# Patient Record
Sex: FEMALE | Race: WHITE | NOT HISPANIC OR LATINO | Employment: FULL TIME | URBAN - METROPOLITAN AREA
[De-identification: names, ages, dates, MRNs, and addresses within clinical notes are randomized per-mention and may not be internally consistent; named-entity substitution may affect disease eponyms.]

---

## 2017-01-23 ENCOUNTER — HOSPITAL ENCOUNTER (OUTPATIENT)
Dept: ULTRASOUND IMAGING | Facility: CLINIC | Age: 27
Discharge: HOME | End: 2017-01-23
Attending: OBSTETRICS & GYNECOLOGY | Admitting: OBSTETRICS & GYNECOLOGY
Payer: COMMERCIAL

## 2017-01-23 DIAGNOSIS — Z34.82 PRENATAL CARE, SUBSEQUENT PREGNANCY, SECOND TRIMESTER: ICD-10-CM

## 2017-01-23 PROCEDURE — 76805 OB US >/= 14 WKS SNGL FETUS: CPT

## 2017-01-24 NOTE — PROGRESS NOTES
Quick Note:    Your results are normal. Please followup as was discussed in the office or call with questions.  Lina Butler MD      ______

## 2017-02-03 ENCOUNTER — PRENATAL OFFICE VISIT (OUTPATIENT)
Dept: OBGYN | Facility: CLINIC | Age: 27
End: 2017-02-03
Payer: COMMERCIAL

## 2017-02-03 VITALS
HEIGHT: 62 IN | HEART RATE: 110 BPM | WEIGHT: 190.2 LBS | BODY MASS INDEX: 35 KG/M2 | DIASTOLIC BLOOD PRESSURE: 64 MMHG | SYSTOLIC BLOOD PRESSURE: 123 MMHG

## 2017-02-03 DIAGNOSIS — Z34.80 PRENATAL CARE, SUBSEQUENT PREGNANCY, UNSPECIFIED TRIMESTER: Primary | ICD-10-CM

## 2017-02-03 PROCEDURE — 99207 ZZC PRENATAL VISIT: CPT | Performed by: OBSTETRICS & GYNECOLOGY

## 2017-02-03 NOTE — PROGRESS NOTES
"CC: prenatal visit  S:  No ctx/lof/vb/dc.  Good fm  /64 mmHg  Pulse 110  Ht 5' 1.5\" (1.562 m)  Wt 190 lb 3.2 oz (86.274 kg)  BMI 35.36 kg/m2  LMP  (LMP Unknown)  US normal and reviewed  A/P routine precautions  F/u 4 weeks  Labs ordered  Rh negative plan for rhogam 28 and tdap  Lina Butler MD      "

## 2017-02-03 NOTE — NURSING NOTE
"Chief Complaint   Patient presents with     Prenatal Care     abdominal pain after son hit belly       Initial /64 mmHg  Pulse 110  Ht 5' 1.5\" (1.562 m)  Wt 190 lb 3.2 oz (86.274 kg)  BMI 35.36 kg/m2  LMP  (LMP Unknown) Estimated body mass index is 35.36 kg/(m^2) as calculated from the following:    Height as of this encounter: 5' 1.5\" (1.562 m).    Weight as of this encounter: 190 lb 3.2 oz (86.274 kg).  BP completed using cuff size: srinath Jasso LPN      "

## 2017-03-03 ENCOUNTER — TELEPHONE (OUTPATIENT)
Dept: OBGYN | Facility: CLINIC | Age: 27
End: 2017-03-03

## 2017-03-03 ENCOUNTER — PRENATAL OFFICE VISIT (OUTPATIENT)
Dept: OBGYN | Facility: CLINIC | Age: 27
End: 2017-03-03
Payer: COMMERCIAL

## 2017-03-03 VITALS
WEIGHT: 201 LBS | BODY MASS INDEX: 36.99 KG/M2 | HEART RATE: 89 BPM | SYSTOLIC BLOOD PRESSURE: 102 MMHG | DIASTOLIC BLOOD PRESSURE: 55 MMHG | HEIGHT: 62 IN

## 2017-03-03 DIAGNOSIS — O99.810 ABNORMAL MATERNAL GLUCOSE TOLERANCE, ANTEPARTUM: Primary | ICD-10-CM

## 2017-03-03 DIAGNOSIS — O99.810 ABNORMAL MATERNAL GLUCOSE TOLERANCE, ANTEPARTUM: ICD-10-CM

## 2017-03-03 DIAGNOSIS — Z34.80 PRENATAL CARE, SUBSEQUENT PREGNANCY, UNSPECIFIED TRIMESTER: ICD-10-CM

## 2017-03-03 DIAGNOSIS — Z34.82 PRENATAL CARE, SUBSEQUENT PREGNANCY, SECOND TRIMESTER: Primary | ICD-10-CM

## 2017-03-03 LAB
GLUCOSE 1H P 50 G GLC PO SERPL-MCNC: 196 MG/DL (ref 60–129)
HGB BLD-MCNC: 10.9 G/DL (ref 11.7–15.7)
T PALLIDUM IGG+IGM SER QL: NEGATIVE

## 2017-03-03 PROCEDURE — 36415 COLL VENOUS BLD VENIPUNCTURE: CPT | Performed by: OBSTETRICS & GYNECOLOGY

## 2017-03-03 PROCEDURE — 82950 GLUCOSE TEST: CPT | Performed by: OBSTETRICS & GYNECOLOGY

## 2017-03-03 PROCEDURE — 99207 ZZC PRENATAL VISIT: CPT | Performed by: OBSTETRICS & GYNECOLOGY

## 2017-03-03 PROCEDURE — 86780 TREPONEMA PALLIDUM: CPT | Performed by: OBSTETRICS & GYNECOLOGY

## 2017-03-03 PROCEDURE — 00000218 ZZHCL STATISTIC OBHBG - HEMOGLOBIN: Performed by: OBSTETRICS & GYNECOLOGY

## 2017-03-03 NOTE — NURSING NOTE
"Initial /55 (BP Location: Left arm, Patient Position: Chair, Cuff Size: Adult Large)  Pulse 89  Ht 5' 1.5\" (1.562 m)  Wt 201 lb (91.2 kg)  LMP  (LMP Unknown)  BMI 37.36 kg/m2 Estimated body mass index is 37.36 kg/(m^2) as calculated from the following:    Height as of this encounter: 5' 1.5\" (1.562 m).    Weight as of this encounter: 201 lb (91.2 kg). .    Anai Hendrix    "

## 2017-03-03 NOTE — PROGRESS NOTES
Prenatal Breastfeeding Education Toolkit provided for patient to review, helping her to make an informed decision on a feeding choice for her baby. Questions directed to the provider.  Introduced  Anai Hendrix

## 2017-03-03 NOTE — TELEPHONE ENCOUNTER
Pt 25 w preg. She had a glucose test done today and she failed it.  Pt wants to know if she can re-take?  She said she thinks she failed it because she has been having a lot of sweets lately.      Please advise -    Deanna Mcmullen  [facility] [place]

## 2017-03-03 NOTE — PROGRESS NOTES
"Doing well.   C/o of bilateral carpel tunnel symptoms of her hands; numbness and tingling; requests wrist splints, she needed this for her last pregnancy  Denies VB, ctx, LOF. +FM  /55 (BP Location: Left arm, Patient Position: Chair, Cuff Size: Adult Large)  Pulse 89  Ht 5' 1.5\" (1.562 m)  Wt 201 lb (91.2 kg)  LMP  (LMP Unknown)  BMI 37.36 kg/m2  General Appearance: NAD  Abdomen: Gravid, NT  Refer to flow sheet above.   A/P: 26 year old  at 25w3d  -- 1hr GCT, OB Hgb today; syphilis testing offered; patient accepts  -- bilateral carpel tunnel: wrist splints ordered  -- PTL precautions reviewed  RTC in 4 weeks    Star Mancilla MD  [facility]            "

## 2017-03-03 NOTE — PROGRESS NOTES
Pt notified of below.  Pt reports understanding.  Pt does not have further questions or concerns.  Referral generated.  Patient will call for appointment.    Maria Eugenia Arguello   [facility]  MARJORIE

## 2017-03-03 NOTE — TELEPHONE ENCOUNTER
Spoke with patient on the phone.  Recommended following through with DE due to results of testing.  Glucose monitoring will be beneficial to monitor and track patient BS levels.  Patient reports understanding.    Maria Eugenia Arguello   [facility] Clinic  RN

## 2017-03-03 NOTE — MR AVS SNAPSHOT
"              After Visit Summary   3/3/2017    Mora Reardon    MRN: [id number]           Patient Information     Date Of Birth          1990        Visit Information        Provider Department      3/3/2017 9:30 AM Star Mancilla MD [facility]        Today's Diagnoses     Prenatal care, subsequent pregnancy, second trimester    -  1       Follow-ups after your visit        Who to contact     If you have questions or need follow up information about today's clinic visit or your schedule please contact [facility] directly at [phone].  Normal or non-critical lab and imaging results will be communicated to you by [employer], letter or phone within 4 business days after the clinic has received the results. If you do not hear from us within 7 days, please contact the clinic through [employer] or phone. If you have a critical or abnormal lab result, we will notify you by phone as soon as possible.  Submit refill requests through [employer] or call your pharmacy and they will forward the refill request to us. Please allow 3 business days for your refill to be completed.          Additional Information About Your Visit        MyChart Information     [employer] lets you send messages to your doctor, view your test results, renew your prescriptions, schedule appointments and more. To sign up, go to [email or web address] . Click on \"Log in\" on the left side of the screen, which will take you to the Welcome page. Then click on \"Sign up Now\" on the right side of the page.     You will be asked to enter the access code listed below, as well as some personal information. Please follow the directions to create your username and password.     Your access code is: [email or web address]  Expires: 2017  9:37 AM     Your access code will  in 90 days. If you need help or a new code, please call your [facility] or [phone].        Care EveryWhere ID     This is your Care EveryWhere " "ID. This could be used by other organizations to access your [facility] medical records  [id number]        Your Vitals Were     Pulse Height Last Period BMI (Body Mass Index)          89 5' 1.5\" (1.562 m) (LMP Unknown) 37.36 kg/m2         Blood Pressure from Last 3 Encounters:   03/03/17 102/55   02/03/17 123/64   12/30/16 122/50    Weight from Last 3 Encounters:   03/03/17 201 lb (91.2 kg)   02/03/17 190 lb 3.2 oz (86.3 kg)   12/30/16 180 lb 12.8 oz (82 kg)              We Performed the Following     Anti Treponema        Primary Care Provider    [facility]       No address on file        Thank you!     Thank you for choosing [facility]  for your care. Our goal is always to provide you with excellent care. Hearing back from our patients is one way we can continue to improve our services. Please take a few minutes to complete the written survey that you may receive in the mail after your visit with us. Thank you!             Your Updated Medication List - Protect others around you: Learn how to safely use, store and throw away your medicines at www.disposemymeds.org.          This list is accurate as of: 3/3/17  9:37 AM.  Always use your most recent med list.                   Brand Name Dispense Instructions for use    CHILDRENS CHEWABLE VITAMINS PO          potassium chloride 20 MEQ Packet    KLOR-CON    90 tablet    Take 20 mEq by mouth daily         "

## 2017-03-06 NOTE — PROGRESS NOTES
Syphilis testing negative. Will review at next follow-up visit.     Star Mancilla MD  [facility]

## 2017-03-23 ENCOUNTER — OFFICE VISIT (OUTPATIENT)
Dept: FAMILY MEDICINE | Facility: CLINIC | Age: 27
End: 2017-03-23
Payer: COMMERCIAL

## 2017-03-23 VITALS
BODY MASS INDEX: 36.99 KG/M2 | TEMPERATURE: 98.7 F | WEIGHT: 199 LBS | HEART RATE: 100 BPM | SYSTOLIC BLOOD PRESSURE: 104 MMHG | DIASTOLIC BLOOD PRESSURE: 50 MMHG

## 2017-03-23 DIAGNOSIS — H66.005 RECURRENT ACUTE SUPPURATIVE OTITIS MEDIA WITHOUT SPONTANEOUS RUPTURE OF LEFT TYMPANIC MEMBRANE: Primary | ICD-10-CM

## 2017-03-23 PROCEDURE — 99213 OFFICE O/P EST LOW 20 MIN: CPT | Performed by: PHYSICIAN ASSISTANT

## 2017-03-23 RX ORDER — AMOXICILLIN 500 MG/1
1000 CAPSULE ORAL 2 TIMES DAILY
Qty: 30 CAPSULE | Refills: 0 | Status: SHIPPED | OUTPATIENT
Start: 2017-03-23 | End: 2017-04-14

## 2017-03-23 ASSESSMENT — ENCOUNTER SYMPTOMS
SENSORY CHANGE: 0
DOUBLE VISION: 0
SEIZURES: 0
ABDOMINAL PAIN: 0
LOSS OF CONSCIOUSNESS: 0
SPUTUM PRODUCTION: 0
PALPITATIONS: 0
BACK PAIN: 0
TINGLING: 0
SORE THROAT: 0
WEIGHT LOSS: 0
DIARRHEA: 0
FOCAL WEAKNESS: 0
COUGH: 0
DYSURIA: 0
NERVOUS/ANXIOUS: 0
CONSTIPATION: 0
INSOMNIA: 0
EYE DISCHARGE: 0
EYE REDNESS: 0
ORTHOPNEA: 0
DIAPHORESIS: 0
HALLUCINATIONS: 0
FREQUENCY: 0
DIZZINESS: 0
BLOOD IN STOOL: 0
WHEEZING: 0
WEAKNESS: 0
NECK PAIN: 0
BLURRED VISION: 0
FEVER: 1
MYALGIAS: 0
HEARTBURN: 0
PHOTOPHOBIA: 0
EYE PAIN: 0
DEPRESSION: 0
HEMOPTYSIS: 0
NAUSEA: 0
VOMITING: 0
NEUROLOGICAL NEGATIVE: 1
SHORTNESS OF BREATH: 0
HEADACHES: 0

## 2017-03-23 ASSESSMENT — LIFESTYLE VARIABLES: SUBSTANCE_ABUSE: 0

## 2017-03-23 NOTE — MR AVS SNAPSHOT
After Visit Summary   3/23/2017    Mora Reardon    MRN: [id number]           Patient Information     Date Of Birth          1990        Visit Information        Provider Department      3/23/2017 8:40 AM Warren Holt PA-C [facility]        Today's Diagnoses     Recurrent acute suppurative otitis media without spontaneous rupture of left tympanic membrane    -  1      Care Instructions    Follow-up if not improving        Follow-ups after your visit        Your next 10 appointments already scheduled     Mar 31, 2017  8:45 AM CDT   ESTABLISHED PRENATAL with Star Mancilla MD   [facility]    [address] [facility] [place] [address]   [phone]            Apr 04, 2017  8:30 AM CDT   Diabetic Education with [place] [facility]   [facility] ([facility])    [address] [facility] [address]   [phone]              Who to contact     If you have questions or need follow up information about today's clinic visit or your schedule please contact [facility] directly at [phone].  Normal or non-critical lab and imaging results will be communicated to you by MyChart, letter or phone within 4 business days after the clinic has received the results. If you do not hear from us within 7 days, please contact the clinic through [employer] or phone. If you have a critical or abnormal lab result, we will notify you by phone as soon as possible.  Submit refill requests through [employer] or call your pharmacy and they will forward the refill request to us. Please allow 3 business days for your refill to be completed.          Additional Information About Your Visit        MyChart Information     [employer] lets you send messages to your doctor, view your test results, renew your prescriptions, schedule appointments and more. To sign up, go to [email or web address] . Click on  "\"Log in\" on the left side of the screen, which will take you to the Welcome page. Then click on \"Sign up Now\" on the right side of the page.     You will be asked to enter the access code listed below, as well as some personal information. Please follow the directions to create your username and password.     Your access code is: [email or web address]  Expires: 2017 10:37 AM     Your access code will  in 90 days. If you need help or a new code, please call your [facility] clinic or [phone].        Care EveryWhere ID     This is your Care EveryWhere ID. This could be used by other organizations to access your [facility] medical records  [id number]        Your Vitals Were     Pulse Temperature Last Period BMI (Body Mass Index)          100 98.7  F (37.1  C) (Tympanic) (LMP Unknown) 36.99 kg/m2         Blood Pressure from Last 3 Encounters:   17 104/50   17 102/55   17 123/64    Weight from Last 3 Encounters:   17 199 lb (90.3 kg)   17 201 lb (91.2 kg)   17 190 lb 3.2 oz (86.3 kg)              Today, you had the following     No orders found for display         Today's Medication Changes          These changes are accurate as of: 3/23/17  9:05 AM.  If you have any questions, ask your nurse or doctor.               Start taking these medicines.        Dose/Directions    amoxicillin 500 MG capsule   Commonly known as:  AMOXIL   Used for:  Recurrent acute suppurative otitis media without spontaneous rupture of left tympanic membrane   Started by:  Warren Holt PA-C        Dose:  1000 mg   Take 2 capsules (1,000 mg) by mouth 2 times daily   Quantity:  30 capsule   Refills:  0            Where to get your medicines      These medications were sent to [facility] [place] [id number] [place] [id number]     Phone:  [phone]     amoxicillin 500 MG capsule                Primary Care Provider    [facility]       No address on file        Thank you!     Thank you for choosing [facility]  for your care. Our goal is always to provide you with excellent care. Hearing back from our patients is one way we can continue to improve our services. Please take a few minutes to complete the written survey that you may receive in the mail after your visit with us. Thank you!             Your Updated Medication List - Protect others around you: Learn how to safely use, store and throw away your medicines at www.disposemymeds.org.          This list is accurate as of: 3/23/17  9:05 AM.  Always use your most recent med list.                   Brand Name Dispense Instructions for use    amoxicillin 500 MG capsule    AMOXIL    30 capsule    Take 2 capsules (1,000 mg) by mouth 2 times daily       CHILDRENS CHEWABLE VITAMINS PO          potassium chloride 20 MEQ Packet    KLOR-CON    90 tablet    Take 20 mEq by mouth daily

## 2017-03-23 NOTE — PROGRESS NOTES
HPI    SUBJECTIVE:                                                    Mora Reardon is a 26 year old female who presents to clinic today for the following health issues:    ENT Symptoms             Symptoms: cc Present Absent Comment   Fever/Chills   x    Fatigue  x     Muscle Aches   x    Eye Irritation   x    Sneezing   x    Nasal Lupillo/Drg   x    Sinus Pressure/Pain   x    Loss of smell   x    Dental pain   x    Sore Throat   x    Swollen Glands   x    Ear Pain/Fullness  x  Left ear    Cough  x     Wheeze   x    Chest Pain   x    Shortness of breath   x    Rash       Other         Symptom duration:  Since early this morning    Symptom severity:     Treatments tried:     Contacts:  Not that she knows of      Problem list and histories reviewed & adjusted, as indicated.  Additional history: as documented    Patient Active Problem List   Diagnosis     Bipolar disorder (H)     Constipation     Bartter's syndrome     Esophageal reflux     History of marijuana, cocaine, and methampetamine use-quit May 2005     Chlamydia treated 4/24/06     Condyloma acuminatum     Rh negative status during pregnancy     Patient non-compliance     CTS (carpal tunnel syndrome)     GERD (gastroesophageal reflux disease)     CARDIOVASCULAR SCREENING; LDL GOAL LESS THAN 160     Generalized anxiety disorder     Mild major depression (H)     Prenatal care, subsequent pregnancy     Abnormal maternal glucose tolerance, antepartum     Past Surgical History:   Procedure Laterality Date     NO HISTORY OF SURGERY         Social History   Substance Use Topics     Smoking status: Former Smoker     Packs/day: 0.50     Years: 6.00     Types: Cigarettes     Smokeless tobacco: Not on file      Comment: smoking 10cig/day- quit with pregnancy     Alcohol use No      Comment: rare- quit with pregnancy     Family History   Problem Relation Age of Onset     DIABETES Mother      Thyroid Disease Mother      Depression Mother      Hypertension Paternal  Grandmother      Cancer - colorectal Paternal Grandmother      colon     CANCER Paternal Grandfather      tongue cancer, not smoker     Genetic Disorder Sister      Bartter Syndrome         Current Outpatient Prescriptions   Medication Sig Dispense Refill     amoxicillin (AMOXIL) 500 MG capsule Take 2 capsules (1,000 mg) by mouth 2 times daily 30 capsule 0     Pediatric Multiple Vit-C-FA (CHILDRENS CHEWABLE VITAMINS PO)        potassium chloride (KLOR-CON) 20 MEQ packet Take 20 mEq by mouth daily 90 tablet 3     Allergies   Allergen Reactions     Keflex [Cephalexin Monohydrate] Hives and Rash     Labs reviewed in EPIC    Reviewed and updated as needed this visit by clinical staff  Tobacco  Allergies  Med Hx  Surg Hx  Fam Hx  Soc Hx      Reviewed and updated as needed this visit by Provider       Review of Systems   Constitutional: Positive for fever. Negative for diaphoresis, malaise/fatigue and weight loss.   HENT: Positive for congestion and ear pain. Negative for ear discharge, hearing loss, nosebleeds and sore throat.    Eyes: Negative for blurred vision, double vision, photophobia, pain, discharge and redness.   Respiratory: Negative for cough, hemoptysis, sputum production, shortness of breath and wheezing.    Cardiovascular: Negative for chest pain, palpitations, orthopnea and leg swelling.   Gastrointestinal: Negative for abdominal pain, blood in stool, constipation, diarrhea, heartburn, melena, nausea and vomiting.   Genitourinary: Negative.  Negative for dysuria, frequency and urgency.   Musculoskeletal: Negative for back pain, joint pain, myalgias and neck pain.   Skin: Negative for itching and rash.   Neurological: Negative.  Negative for dizziness, tingling, sensory change, focal weakness, seizures, loss of consciousness, weakness and headaches.   Endo/Heme/Allergies: Negative.    Psychiatric/Behavioral: Negative for depression, hallucinations, substance abuse and suicidal ideas. The patient is not  nervous/anxious and does not have insomnia.          Physical Exam   Constitutional: She is oriented to person, place, and time and well-developed, well-nourished, and in no distress.   HENT:   Head: Normocephalic and atraumatic.   Right Ear: Hearing, external ear and ear canal normal. Tympanic membrane is not injected, not erythematous and not bulging. Tympanic membrane mobility is normal. No middle ear effusion.   Left Ear: Hearing, external ear and ear canal normal. Tympanic membrane is injected, erythematous and bulging. Tympanic membrane mobility is abnormal. A middle ear effusion is present.   Nose: Rhinorrhea present.   Mouth/Throat: Oropharyngeal exudate present.   Eyes: Conjunctivae, EOM and lids are normal. Pupils are equal, round, and reactive to light.   Neck: Normal range of motion and full passive range of motion without pain. Neck supple. Carotid bruit is not present. No tracheal deviation present. No thyroid mass and no thyromegaly present.   Cardiovascular: Normal rate, regular rhythm, normal heart sounds and normal pulses.    Pulmonary/Chest: Effort normal and breath sounds normal.   Abdominal: Soft. Normal appearance. There is no tenderness.   Musculoskeletal: Normal range of motion.   Lymphadenopathy:     She has no cervical adenopathy.   Neurological: She is alert and oriented to person, place, and time.   Skin: Skin is warm, dry and intact.   Psychiatric: Mood, memory, affect and judgment normal.           (H66.005) Recurrent acute suppurative otitis media without spontaneous rupture of left tympanic membrane  (primary encounter diagnosis)  Comment:   Plan: amoxicillin (AMOXIL) 500 MG capsule           Follow-up if not improving.

## 2017-03-23 NOTE — NURSING NOTE
"Chief Complaint   Patient presents with     Ear Problem       Initial /50 (BP Location: Right arm, Patient Position: Chair, Cuff Size: Adult Large)  Pulse 100  Temp 98.7  F (37.1  C) (Tympanic)  Wt 199 lb (90.3 kg)  LMP  (LMP Unknown)  BMI 36.99 kg/m2 Estimated body mass index is 36.99 kg/(m^2) as calculated from the following:    Height as of 3/3/17: 5' 1.5\" (1.562 m).    Weight as of this encounter: 199 lb (90.3 kg).  Medication Reconciliation: complete    Health Maintenance that is potentially due pending provider review:  NONE    n/a    Katherine CATHERINE CMA    "

## 2017-03-25 ENCOUNTER — TELEPHONE (OUTPATIENT)
Dept: NURSING | Facility: CLINIC | Age: 27
End: 2017-03-25

## 2017-03-25 NOTE — TELEPHONE ENCOUNTER
Call Type: Triage Call    Presenting Problem: Caller  is inquiring if it is okay that she took  her amoxicillin 2 hrs earlier than usual and if it is okay to  tyleonol with it.   is  28 weeks pregnant and  has  an ear infection  Advised that doses should be   about 12 hrs apart but that  there  is no illl effect if taken sooner and that  acetaminophen is  compatible with amoxicillin.  Triage Note:  Guideline Title: Medication Questions - Adult  Recommended Disposition: Provide Health Information  Original Inclination: Wanted to speak with a nurse  Override Disposition:  Intended Action: Follow Selfcare / Homecare  Physician Contacted: No  Caller has medication question(s) that was answered with available resources ?  YES  Pregnant and has medication questions regarding prescribed and/or nonprescribed  medication(s) not covered by available resources ? NO  Breastfeeding and has medication questions regarding prescribed and/or  nonprescribed medication(s) not covered by available resources ? NO  Requested information on how to safely dispose of  or unused medications ?  NO  Sign(s) or symptom(s) associated with a diagnosed condition or with a new illness  ? NO  Prescription ordered today and not available at pharmacy putting patient at  clinical risk ? NO  Recurrence of a symptom(s) or illness post prescribed medication treatment AND  provider instructed patient to call if symptom(s) returned. ? NO  Unable to obtain prescribed medication related to available resources AND  situation poses immediate clinical risk ? NO  Pharmacy calling to clarify prescription order. ? NO  Requests refill of prescribed medication that does NOT have a valid refill; lack  of medication may cause clinical risk to patient if not available. ? NO  Has questions about prescribed and/or nonprescribed medications not covered by  available resources ? NO  Pharmacy calling with prescription question; answered per department policy. ?  NO  Requests refill of prescribed medication without valid refills OR requests refill  of prescribed medication with valid refills but does not have prescription number  (no RX container); lack of medication does not put patient at clinical risk ? NO  Physician Instructions:  Care Advice:

## 2017-03-31 ENCOUNTER — PRENATAL OFFICE VISIT (OUTPATIENT)
Dept: OBGYN | Facility: CLINIC | Age: 27
End: 2017-03-31
Payer: COMMERCIAL

## 2017-03-31 VITALS
DIASTOLIC BLOOD PRESSURE: 59 MMHG | HEART RATE: 114 BPM | SYSTOLIC BLOOD PRESSURE: 110 MMHG | WEIGHT: 199.8 LBS | BODY MASS INDEX: 37.14 KG/M2

## 2017-03-31 DIAGNOSIS — Z34.83 PRENATAL CARE, SUBSEQUENT PREGNANCY, THIRD TRIMESTER: Primary | ICD-10-CM

## 2017-03-31 PROCEDURE — 96372 THER/PROPH/DIAG INJ SC/IM: CPT | Performed by: OBSTETRICS & GYNECOLOGY

## 2017-03-31 PROCEDURE — 90715 TDAP VACCINE 7 YRS/> IM: CPT | Performed by: OBSTETRICS & GYNECOLOGY

## 2017-03-31 PROCEDURE — 90471 IMMUNIZATION ADMIN: CPT | Performed by: OBSTETRICS & GYNECOLOGY

## 2017-03-31 PROCEDURE — 99207 ZZC PRENATAL VISIT: CPT | Performed by: OBSTETRICS & GYNECOLOGY

## 2017-03-31 NOTE — MR AVS SNAPSHOT
After Visit Summary   3/31/2017    Mora Reardon    MRN: [id number]           Patient Information     Date Of Birth          1990        Visit Information        Provider Department      3/31/2017 8:45 AM Star Mancilla MD [facility]        Today's Diagnoses     Prenatal care, subsequent pregnancy, third trimester    -  1      Care Instructions    Per Physician's instructions          Follow-ups after your visit        Your next 10 appointments already scheduled     Apr 04, 2017  8:30 AM CDT   Diabetic Education with [place] ED RESOURCE   [facility])    [address] [facility] [address]   [phone]            Apr 14, 2017  8:45 AM CDT   ESTABLISHED PRENATAL with Star Mancilla MD   [facility] ([facility])    [address] [facility] [address]   [phone]            Apr 28, 2017  9:00 AM CDT   ESTABLISHED PRENATAL with Savi Smart MD   [facility] ([facility])    [address] [facility] [address]   [phone]            May 10, 2017  9:00 AM CDT   ESTABLISHED PRENATAL with Lina Butler MD   [facility] ([facility])    [address] [facility] [address]   [phone]            May 23, 2017  9:00 AM CDT   ESTABLISHED PRENATAL with Savi Smart MD   [facility] ([facility])    [address] [place] [address]   [phone]              Who to contact     If you have questions or need follow up information about today's clinic visit or your schedule please contact [facility] directly at [phone].  Normal or non-critical lab and imaging results will be communicated to you by MyChart, letter or phone within 4 business days after the clinic has received the results. If you do not hear from us  "within 7 days, please contact the clinic through [employer] or phone. If you have a critical or abnormal lab result, we will notify you by phone as soon as possible.  Submit refill requests through [employer] or call your pharmacy and they will forward the refill request to us. Please allow 3 business days for your refill to be completed.          Additional Information About Your Visit        [employer] Information     [employer] lets you send messages to your doctor, view your test results, renew your prescriptions, schedule appointments and more. To sign up, go to [email or web address] . Click on \"Log in\" on the left side of the screen, which will take you to the Welcome page. Then click on \"Sign up Now\" on the right side of the page.     You will be asked to enter the access code listed below, as well as some personal information. Please follow the directions to create your username and password.     Your access code is: [email or web address]  Expires: 2017 10:37 AM     Your access code will  in 90 days. If you need help or a new code, please call your [place] clinic or [phone].        Care EveryWhere ID     This is your Care EveryWhere ID. This could be used by other organizations to access your [place] medical records  [id number]        Your Vitals Were     Pulse Last Period BMI (Body Mass Index)             114 (LMP Unknown) 37.14 kg/m2          Blood Pressure from Last 3 Encounters:   17 110/59   17 104/50   17 102/55    Weight from Last 3 Encounters:   17 199 lb 12.8 oz (90.6 kg)   17 199 lb (90.3 kg)   17 201 lb (91.2 kg)              We Performed the Following     INJECTION INTRAMUSCULAR OR SUB-Q     RH IMMUNE GLOBULIN     TDAP VACCINE (ADACEL)     VACCINE ADMINISTRATION, INITIAL        Primary Care Provider    [facility]       No address on file        Thank you!     Thank you for choosing [facility]  for your care. Our goal is always " to provide you with excellent care. Hearing back from our patients is one way we can continue to improve our services. Please take a few minutes to complete the written survey that you may receive in the mail after your visit with us. Thank you!             Your Updated Medication List - Protect others around you: Learn how to safely use, store and throw away your medicines at www.disposemymeds.org.          This list is accurate as of: 3/31/17  9:29 AM.  Always use your most recent med list.                   Brand Name Dispense Instructions for use    amoxicillin 500 MG capsule    AMOXIL    30 capsule    Take 2 capsules (1,000 mg) by mouth 2 times daily       [facility] PRENATAL GUMMY PO          potassium chloride 20 MEQ Packet    KLOR-CON    90 tablet    Take 20 mEq by mouth daily

## 2017-03-31 NOTE — PROGRESS NOTES
Doing well.   Dealing with left ear infection; sought care at urgent care for it and is currently being treated with antibiotics; reports improvement in symptoms  Otherwise, no other complaints.   Denies VB, ctx, LOF. +FM   /59 (BP Location: Right arm, Patient Position: Chair, Cuff Size: Adult Large)  Pulse 114  Wt 199 lb 12.8 oz (90.6 kg)  LMP  (LMP Unknown)  BMI 37.14 kg/m2  General Appearance: NAD  Abdomen: Gravid, NT  Refer to flow sheet above.   A/P: 26 year old  at 29w3d  -- S>D; if this discordance persists at next visit, then will order growth US  -- reviewed abnormal (highly elevated) 1 hr GCT; per [facility] recs, forego 3 hr GTT and encourage diabetic referral for teaching and diet modification  -- reviewed mildly low OB Hgb; iron supplementation encouraged  -- Tdap vaccine offered; patient accepts  -- Rh negative status; Rhogam to be given today  -- reviewed negative syphilis testing  -- PTL precautions reviewed  RTC in 2 weeks    Star Mancilla MD  [facility]

## 2017-03-31 NOTE — NURSING NOTE
"Initial /59 (BP Location: Right arm, Patient Position: Chair, Cuff Size: Adult Large)  Pulse 114  Wt 199 lb 12.8 oz (90.6 kg)  LMP  (LMP Unknown)  BMI 37.14 kg/m2 Estimated body mass index is 37.14 kg/(m^2) as calculated from the following:    Height as of 3/3/17: 5' 1.5\" (1.562 m).    Weight as of this encounter: 199 lb 12.8 oz (90.6 kg). .    Jose Dawkins, JOI  "

## 2017-04-04 ENCOUNTER — ALLIED HEALTH/NURSE VISIT (OUTPATIENT)
Dept: EDUCATION SERVICES | Facility: CLINIC | Age: 27
End: 2017-04-04
Payer: COMMERCIAL

## 2017-04-04 VITALS — BODY MASS INDEX: 37.59 KG/M2 | WEIGHT: 202.2 LBS

## 2017-04-04 DIAGNOSIS — Z34.83 PRENATAL CARE, SUBSEQUENT PREGNANCY, THIRD TRIMESTER: ICD-10-CM

## 2017-04-04 DIAGNOSIS — O99.810 ABNORMAL MATERNAL GLUCOSE TOLERANCE, ANTEPARTUM: Primary | ICD-10-CM

## 2017-04-04 PROCEDURE — G0108 DIAB MANAGE TRN  PER INDIV: HCPCS

## 2017-04-04 RX ORDER — BISMUTH SUBSALICYLATE 262MG/15ML
SUSPENSION, ORAL (FINAL DOSE FORM) ORAL
Qty: 1 BOX | Refills: 1 | Status: ON HOLD | OUTPATIENT
Start: 2017-04-04 | End: 2017-05-27

## 2017-04-04 NOTE — MR AVS SNAPSHOT
After Visit Summary   4/4/2017    Mora Reardon    MRN: [id number]           Patient Information     Date Of Birth          1990        Visit Information        Provider Department      4/4/2017 8:30 AM [place] [facility] ED RES[place] [facility]        Care Instructions    1. Check blood sugar 4 times a day, before breakfast and 1 hour after the start of each meal.     2. Follow the recommended meal plan: eat something every 2-3 hours, include protein/fat and carbohydrate at every meal and snack, have 2-3 carbs at breakfast, 3-4 carbs at lunch, 3-4 carbs at supper, 1-2 carbs at 3 snacks per day.     3. Add activity to every day, try walking or being active after each meal to help control blood sugar levels.    4.Call or e-mail educator if 3 or more blood sugars are above goal in 1 week. Call or e-mail with questions or concerns.      Sharmila Unger RD, LD   Diabetes Education    [facility] for the [facility]:  For Your Diabetes Education or Nutrition Appointments Call:  [phone]   For Diabetes Education and Nutrition Related Questions:   Phone: [phone]  E-mail: [email or web address]  Fax: [phone]   If you need a medication refill please contact your pharmacy. Please allow 3 business days for your refills to be completed.           Follow-ups after your visit        Your next 10 appointments already scheduled     Apr 11, 2017  8:30 AM CDT   Diabetic Education with [place] DI[place] ED RESOURCE   [facility] ([facility])    [address] [facility] [address]   [phone]            Apr 14, 2017  8:45 AM CDT   ESTABLISHED PRENATAL with Star Mancilla MD   [facility] ([facility])    [address] [facility] [address]   [phone]            Apr 28, 2017  9:00 AM CDT   ESTABLISHED PRENATAL with Savi Smart MD   [facility]  "([facility])    [address] [facility] [address]   [phone]            May 10, 2017  9:00 AM CDT   ESTABLISHED PRENATAL with Lina Butler MD   [facility] ([facility])    [address] [facility] [address]   [phone]            May 23, 2017  9:00 AM CDT   ESTABLISHED PRENATAL with Savi Smart MD   [facility] ([facility])    [address] [facility] [address]   [phone]              Who to contact     If you have questions or need follow up information about today's clinic visit or your schedule please contact [facility] directly at [phone].  Normal or non-critical lab and imaging results will be communicated to you by MyChart, letter or phone within 4 business days after the clinic has received the results. If you do not hear from us within 7 days, please contact the clinic through MyChart or phone. If you have a critical or abnormal lab result, we will notify you by phone as soon as possible.  Submit refill requests through [employer] or call your pharmacy and they will forward the refill request to us. Please allow 3 business days for your refill to be completed.          Additional Information About Your Visit        MyChart Information     [employer] lets you send messages to your doctor, view your test results, renew your prescriptions, schedule appointments and more. To sign up, go to [email or web address] . Click on \"Log in\" on the left side of the screen, which will take you to the Welcome page. Then click on \"Sign up Now\" on the right side of the page.     You will be asked to enter the access code listed below, as well as some personal information. Please follow the directions to create your username and password.     Your access code is: [email or web address]  Expires: 2017 10:37 AM     Your access code will  in 90 days. If you need help or a " new code, please call your [place] clinic or [phone].        Care EveryWhere ID     This is your Care EveryWhere ID. This could be used by other organizations to access your [place] medical records  [id number]        Your Vitals Were     Last Period BMI (Body Mass Index)                (LMP Unknown) 37.59 kg/m2           Blood Pressure from Last 3 Encounters:   03/31/17 110/59   03/23/17 104/50   03/03/17 102/55    Weight from Last 3 Encounters:   04/04/17 91.7 kg (202 lb 3.2 oz)   03/31/17 90.6 kg (199 lb 12.8 oz)   03/23/17 90.3 kg (199 lb)              Today, you had the following     No orders found for display       Primary Care Provider    [facility]       No address on file        Thank you!     Thank you for choosing [facility]  for your care. Our goal is always to provide you with excellent care. Hearing back from our patients is one way we can continue to improve our services. Please take a few minutes to complete the written survey that you may receive in the mail after your visit with us. Thank you!             Your Updated Medication List - Protect others around you: Learn how to safely use, store and throw away your medicines at www.disposemymeds.org.          This list is accurate as of: 4/4/17  9:19 AM.  Always use your most recent med list.                   Brand Name Dispense Instructions for use    amoxicillin 500 MG capsule    AMOXIL    30 capsule    Take 2 capsules (1,000 mg) by mouth 2 times daily       [facility] PRENATAL GUMMY PO          potassium chloride 20 MEQ Packet    KLOR-CON    90 tablet    Take 20 mEq by mouth daily

## 2017-04-04 NOTE — PROGRESS NOTES
Agree with plan and educator may order test strips and lancets for new diagnosis of GDM.    Ericka (Dave) Shira, LALA LD CDE

## 2017-04-04 NOTE — PROGRESS NOTES
Diabetes Self-Management Training - Gestational Diabetes    SUBJECTIVE/OBJECTIVE:  Mora Reardon presents today for education related to gestational diabetes.  She is accompanied by self    Patient's gestational diabetes management related comments/concerns: none. Patient's emotional response to diabetes: expresses readiness to learn    LMP  (LMP Unknown)        Wt Readings from Last 5 Encounters:   17 91.7 kg (202 lb 3.2 oz)   17 90.6 kg (199 lb 12.8 oz)   17 90.3 kg (199 lb)   17 91.2 kg (201 lb)   17 86.3 kg (190 lb 3.2 oz)      30w0d 2017   Estimated Date of Delivery: 2017    Lab Results   Component Value Date    GLC 79 2016     1 hour OGTT: 196 on 2017 (ate 3 toaster strudels 1 hour before coming in)   3 hour OGTT:  Did not complete    History   Smoking Status     Former Smoker     Packs/day: 0.50     Years: 6.00     Types: Cigarettes   Smokeless Tobacco     Not on file     Comment: smoking 10cig/day- quit with pregnancy       Lifestyle and Health Behaviors:  Physical Activity: no regular exercise program.  Is always walking around with son and taking the dog out.     Nutrition:  Patient eats 3 meals and 3 snacks per day.    Breakfast:  6/7am Yogurt (whips, 25 grams carb) + banana OR orange  Lunch: noon - sandwich OR ham/cheese croissant   Snack:  Chips/sour cream dip OR popcorn  Dinner:  6-630 Hamburger w/ veggies and rice   Bedtime Snack:  Chips  OR ranch with carrots      Other time(s) food is eaten? 4am occasionally  (PBJ sandwich/chicken nuggest)   Beverages: milk only with cereal,  Chocolate milk, water, OJ.  Sip of soda - doesn't drink whole can     Cultural/Jew diet restrictions: No   Pre- Vitamin: Yes    Supplements: potassium per MD   Experiencing nausea?  No     Socio/Economic considerations:  Support System: family    Health Beliefs and Attitudes:   Stage of Change: PREPARATION (Decided to change - considering  how)    ASSESSMENT:  Patient not diagnosed with GDM officially, but is to start checking and will follow up in one week.  Does have a high carbohydrate diet with potential areas for change and patient reports she can probably make some changes.  Taught how to carb count.      INTERVENTION:  Patient was instructed on Akray meter  (Vital) and was able to provide an accurate return demonstration. Patient's blood glucose reading today was 115 mg/dL 1 hour and 40 minutes after drinking 8+ oz OJ.  Also had 2 yogurts at 4am (50gm CHO).    Educational topics covered today:  GDM diagnosis, pathophysiology, Risks and Complications of GDM, Means of controlling GDM, Using a Blood Glucose Monitor, Blood Glucose Goals, Logging and Interpreting Glucose Results, When to Call a Diabetes Educator or OB Provider, Healthy Eating During Pregnancy, Counting Carbohydrates, Meal Planning for GDM, and Physical Activity    Educational materials provided today:   [place] Understanding Gestational Diabetes  GDM Log Book  Sharps Disposal  Care After Delivery  Akray meter kit    Pt verbalized understanding of concepts discussed and recommendations provided today.     PLAN:  Check glucose 4 times daily, before breakfast and 1 hour after each meal.   Physical activity recommended: any walking/movement as OK by OB MD.    Meal plan: 2-3 carbs at breakfast, 3-4 carbs at lunch, 3-4 carbs at supper, 1-2 carbs at 3 snacks a day.  Follow consistent CHO meal plan, eat CHO and protein/fat at all meals/snacks.    If blood sugars are high and OB adds GDM dx will educate and order ketone sticks     Call/e-mail/MyChart message diabetes educator if 3 or more blood sugars are above the goal in 1 week or if ketones are positive.   Call/e-mail/MyChart message with questions/concerns.    FOLLOW-UP:  Follow up with diabetes educator in 1 week.    Sharmila Unger RD, LD   Diabetes Education    Time Spent: 50 minutes  Encounter type: Individual

## 2017-04-04 NOTE — PATIENT INSTRUCTIONS
1. Check blood sugar 4 times a day, before breakfast and 1 hour after the start of each meal.     2. Follow the recommended meal plan: eat something every 2-3 hours, include protein/fat and carbohydrate at every meal and snack, have 2-3 carbs at breakfast, 3-4 carbs at lunch, 3-4 carbs at supper, 1-2 carbs at 3 snacks per day.     3. Add activity to every day, try walking or being active after each meal to help control blood sugar levels.    4.Call or e-mail educator if 3 or more blood sugars are above goal in 1 week. Call or e-mail with questions or concerns.      Sharmila Unger RD, LALO   Diabetes Education    [facility] for the [facility]:  For Your Diabetes Education or Nutrition Appointments Call:  [phone]   For Diabetes Education and Nutrition Related Questions:   Phone: [phone]  E-mail: [email or web address]  Fax: [phone]   If you need a medication refill please contact your pharmacy. Please allow 3 business days for your refills to be completed.

## 2017-04-11 ENCOUNTER — OFFICE VISIT (OUTPATIENT)
Dept: FAMILY MEDICINE | Facility: CLINIC | Age: 27
End: 2017-04-11
Payer: COMMERCIAL

## 2017-04-11 VITALS
TEMPERATURE: 99 F | DIASTOLIC BLOOD PRESSURE: 67 MMHG | RESPIRATION RATE: 18 BRPM | HEIGHT: 62 IN | WEIGHT: 205.8 LBS | HEART RATE: 96 BPM | SYSTOLIC BLOOD PRESSURE: 120 MMHG | BODY MASS INDEX: 37.87 KG/M2

## 2017-04-11 DIAGNOSIS — H60.502 ACUTE OTITIS EXTERNA OF LEFT EAR, UNSPECIFIED TYPE: Primary | ICD-10-CM

## 2017-04-11 PROCEDURE — 99213 OFFICE O/P EST LOW 20 MIN: CPT | Performed by: FAMILY MEDICINE

## 2017-04-11 RX ORDER — NEOMYCIN SULFATE, POLYMYXIN B SULFATE AND HYDROCORTISONE 10; 3.5; 1 MG/ML; MG/ML; [USP'U]/ML
4 SUSPENSION/ DROPS AURICULAR (OTIC) 4 TIMES DAILY
Qty: 10 ML | Refills: 0 | Status: SHIPPED | OUTPATIENT
Start: 2017-04-11 | End: 2017-04-11

## 2017-04-11 RX ORDER — OFLOXACIN 3 MG/ML
10 SOLUTION AURICULAR (OTIC) 2 TIMES DAILY
Qty: 10 ML | Refills: 0 | Status: SHIPPED | OUTPATIENT
Start: 2017-04-11 | End: 2017-04-18

## 2017-04-11 NOTE — MR AVS SNAPSHOT
After Visit Summary   4/11/2017    Mora Reardon    MRN: [id number]           Patient Information     Date Of Birth          1990        Visit Information        Provider Department      4/11/2017 4:00 PM Eliud Minor MD [facility]        Today's Diagnoses     Acute otitis externa of left ear, unspecified type    -  1      Care Instructions    1. This is a skin infection    2. Drops will clear    3. Call if any persistence of pain or discharge.         Follow-ups after your visit        Your next 10 appointments already scheduled     Apr 11, 2017  4:00 PM CDT   SHORT with Eliud Minor MD   [facility] ([facility])    [address] [place] [address]   [phone]            Apr 14, 2017  8:45 AM CDT   ESTABLISHED PRENATAL with Star Mancilla MD   [facility])    [address] [facility] [address]   [phone]            Apr 28, 2017  9:00 AM CDT   ESTABLISHED PRENATAL with Savi Smart MD   [facility] ([facility])    [address] [facility] [address]   [phone]            May 10, 2017  9:00 AM CDT   ESTABLISHED PRENATAL with Lina Butler MD   [facility] ([facility])    [address] [facility] [address]   [phone]            May 23, 2017  9:00 AM CDT   ESTABLISHED PRENATAL with Savi Smart MD   [facility] ([facility])    [address] [facility] [address]   [phone]              Who to contact     If you have questions or need follow up information about today's clinic visit or your schedule please contact [facility] directly at [phone].  Normal or non-critical lab and imaging results will be communicated to you by MyChart, letter or phone  "within 4 business days after the clinic has received the results. If you do not hear from us within 7 days, please contact the clinic through [employer] or phone. If you have a critical or abnormal lab result, we will notify you by phone as soon as possible.  Submit refill requests through [employer] or call your pharmacy and they will forward the refill request to us. Please allow 3 business days for your refill to be completed.          Additional Information About Your Visit        [employer] Information     [employer] lets you send messages to your doctor, view your test results, renew your prescriptions, schedule appointments and more. To sign up, go to [email or web address] . Click on \"Log in\" on the left side of the screen, which will take you to the Welcome page. Then click on \"Sign up Now\" on the right side of the page.     You will be asked to enter the access code listed below, as well as some personal information. Please follow the directions to create your username and password.     Your access code is: [email or web address]  Expires: 2017 10:37 AM     Your access code will  in 90 days. If you need help or a new code, please call your [place] clinic or [phone].        Care EveryWhere ID     This is your Care EveryWhere ID. This could be used by other organizations to access your [place] medical records  [id number]        Your Vitals Were     Pulse Temperature Respirations Height Last Period BMI (Body Mass Index)    96 99  F (37.2  C) (Tympanic) 18 5' 1.5\" (1.562 m) (LMP Unknown) 38.26 kg/m2       Blood Pressure from Last 3 Encounters:   17 120/67   17 110/59   17 104/50    Weight from Last 3 Encounters:   17 205 lb 12.8 oz (93.4 kg)   17 202 lb 3.2 oz (91.7 kg)   17 199 lb 12.8 oz (90.6 kg)              Today, you had the following     No orders found for display         Today's Medication Changes          These changes are accurate as of: 17  3:50 PM.  If you " have any questions, ask your nurse or doctor.               Start taking these medicines.        Dose/Directions    neomycin-polymyxin-hydrocortisone 3.5-81639-9 otic suspension   Commonly known as:  CORTISPORIN   Used for:  Acute otitis externa of left ear, unspecified type   Started by:  Eliud Minor MD        Dose:  4 drop   Place 4 drops Into the left ear 4 times daily   Quantity:  10 mL   Refills:  0            Where to get your medicines      These medications were sent to [address] [address]     Phone:  [phone]     neomycin-polymyxin-hydrocortisone 3.5-16570-7 otic suspension                Primary Care Provider    [facility]       No address on file        Thank you!     Thank you for choosing [facility]  for your care. Our goal is always to provide you with excellent care. Hearing back from our patients is one way we can continue to improve our services. Please take a few minutes to complete the written survey that you may receive in the mail after your visit with us. Thank you!             Your Updated Medication List - Protect others around you: Learn how to safely use, store and throw away your medicines at www.disposemymeds.org.          This list is accurate as of: 4/11/17  3:50 PM.  Always use your most recent med list.                   Brand Name Dispense Instructions for use    amoxicillin 500 MG capsule    AMOXIL    30 capsule    Take 2 capsules (1,000 mg) by mouth 2 times daily       blood glucose monitoring lancets     1 Box    Use to test blood sugar 4 times daily       blood glucose monitoring test strip    GLUCOCARD VITAL TEST    50 each    Use to test blood sugar 4 times daily       [facility] PRENATAL GUMMY PO          neomycin-polymyxin-hydrocortisone 3.5-26017-8 otic suspension    CORTISPORIN    10 mL    Place 4 drops Into the left ear 4 times  daily       potassium chloride 20 MEQ Packet    KLOR-CON    90 tablet    Take 20 mEq by mouth daily

## 2017-04-11 NOTE — PROGRESS NOTES
SUBJECTIVE:                                                    Mora Reardon is a 26 year old female who presents to clinic today for the following health issues: she has history of middle ear infection and had one recently.  Comes today with left ear pain.      ENT Symptoms             Symptoms: cc Present Absent Comment   Fever/Chills   x    Fatigue   x    Muscle Aches   x    Eye Irritation  x  Left eye   Sneezing  x     Nasal Lupillo/Drg  x     Sinus Pressure/Pain   x    Loss of smell   x    Dental pain   x    Sore Throat  x  Little at night   Swollen Glands   x    Ear Pain/Fullness x x  Left ear   Cough   x    Wheeze   x    Chest Pain   x    Shortness of breath   x    Rash   x    Other         Symptom duration:  Started again 4/9/17, did finish dose of antibiotics previously   Symptom severity:  Getting worse   Treatments tried:  none   Contacts:  none             Problem list and histories reviewed & adjusted, as indicated.  Additional history: as documented    Patient Active Problem List   Diagnosis     Bipolar disorder (H)     Constipation     Bartter's syndrome     Esophageal reflux     History of marijuana, cocaine, and methampetamine use-quit May 2005     Chlamydia treated 4/24/06     Condyloma acuminatum     Rh negative status during pregnancy     Patient non-compliance     CTS (carpal tunnel syndrome)     GERD (gastroesophageal reflux disease)     CARDIOVASCULAR SCREENING; LDL GOAL LESS THAN 160     Generalized anxiety disorder     Mild major depression (H)     Prenatal care, subsequent pregnancy     Abnormal maternal glucose tolerance, antepartum     Past Surgical History:   Procedure Laterality Date     NO HISTORY OF SURGERY         Social History   Substance Use Topics     Smoking status: Former Smoker     Packs/day: 0.50     Years: 6.00     Types: Cigarettes     Smokeless tobacco: Not on file      Comment: smoking 10cig/day- quit with pregnancy     Alcohol use No      Comment: rare- quit with  "pregnancy     Family History   Problem Relation Age of Onset     DIABETES Mother      Thyroid Disease Mother      Depression Mother      Hypertension Paternal Grandmother      Cancer - colorectal Paternal Grandmother      colon     CANCER Paternal Grandfather      tongue cancer, not smoker     Genetic Disorder Sister      Bartter Syndrome         Current Outpatient Prescriptions   Medication Sig Dispense Refill     neomycin-polymyxin-hydrocortisone (CORTISPORIN) 3.5-41025-7 otic suspension Place 4 drops Into the left ear 4 times daily 10 mL 0     blood glucose monitoring (GLUCOCARD VITAL TEST) test strip Use to test blood sugar 4 times daily 50 each 5     blood glucose monitoring (ULTRA THIN 30G) lancets Use to test blood sugar 4 times daily 1 Box 1     Prenatal Vit-Min-FA-Fish Oil ([employer] PRENATAL GUMMY PO)        potassium chloride (KLOR-CON) 20 MEQ packet Take 20 mEq by mouth daily 90 tablet 3     amoxicillin (AMOXIL) 500 MG capsule Take 2 capsules (1,000 mg) by mouth 2 times daily (Patient not taking: Reported on 4/11/2017) 30 capsule 0       Reviewed and updated as needed this visit by clinical staff  Tobacco  Allergies  Meds  Med Hx  Surg Hx  Fam Hx  Soc Hx      Reviewed and updated as needed this visit by Provider         ROS:      OBJECTIVE:                                                    /67  Pulse 96  Temp 99  F (37.2  C) (Tympanic)  Resp 18  Ht 5' 1.5\" (1.562 m)  Wt 205 lb 12.8 oz (93.4 kg)  LMP  (LMP Unknown)  BMI 38.26 kg/m2  Body mass index is 38.26 kg/(m^2).  GENERAL: healthy, alert and no distress. The left external canal is swollen and very tender to touch.  The Tm IS normal bilaterally   NECK: no adenopathy, no asymmetry, masses, or scars and thyroid normal to palpation  RESP: lungs clear to auscultation - no rales, rhonchi or wheezes  CV: regular rate and rhythm, normal S1 S2, no S3 or S4, no murmur, click or rub, no peripheral edema and peripheral pulses strong  ABDOMEN: soft, " nontender, no hepatosplenomegaly, no masses and bowel sounds normal  MS: no gross musculoskeletal defects noted, no edema         ASSESSMENT/PLAN:                                                            1. Acute otitis externa of left ear, unspecified type  Will use topical   - neomycin-polymyxin-hydrocortisone (CORTISPORIN) 3.5-13968-1 otic suspension; Place 4 drops Into the left ear 4 times daily  Dispense: 10 mL; Refill: 0    ASSESSMENT/PLAN:      ICD-10-CM    1. Acute otitis externa of left ear, unspecified type H60.502 neomycin-polymyxin-hydrocortisone (CORTISPORIN) 3.5-21462-6 otic suspension       Patient Instructions   1. This is a skin infection    2. Drops will clear    3. Call if any persistence of pain or discharge.           Eliud Minor MD  [facility]

## 2017-04-11 NOTE — PATIENT INSTRUCTIONS
1. This is a skin infection    2. Drops will clear    3. Call if any persistence of pain or discharge.

## 2017-04-11 NOTE — NURSING NOTE
"Chief Complaint   Patient presents with     Otitis Media     Left ear was in 3/23/17 and fisnished antibiotic the pain has now returned.       Initial /67  Pulse 96  Temp 99  F (37.2  C) (Tympanic)  Resp 18  Ht 5' 1.5\" (1.562 m)  Wt 205 lb 12.8 oz (93.4 kg)  LMP  (LMP Unknown)  BMI 38.26 kg/m2 Estimated body mass index is 38.26 kg/(m^2) as calculated from the following:    Height as of this encounter: 5' 1.5\" (1.562 m).    Weight as of this encounter: 205 lb 12.8 oz (93.4 kg).  Medication Reconciliation: complete    "

## 2017-04-12 ENCOUNTER — TELEPHONE (OUTPATIENT)
Dept: EDUCATION SERVICES | Facility: CLINIC | Age: 27
End: 2017-04-12

## 2017-04-12 NOTE — TELEPHONE ENCOUNTER
Ensure she has a f/u OB visit and to whom.  Please let that provider know she has very elevated blood sugars and may need to consider starting insulin   Thanks   Lina Butler MD         Patient has OB appointment 4/14/17 at 0845 with Dr. Mancilla.  Sticky note created.    Maria Eugenia Arguello   [facility]  RN

## 2017-04-12 NOTE — TELEPHONE ENCOUNTER
Called out to patient this date to re-schedule and follow up from no-show yesterday.  Set up a follow up appointment in 6 days to review BG logs.  Is eating at 2-3am - snacks or left food from dinner, but then checking at 5am-6am and getting results in the 1-teens, however this is not a true fasting result.  Does not eat breakfast until 8/9am and will have patient start checking at this time so it can be considered more of a true fasting value.  Reports 2 high BGs after meals at 175, 184 (much elevated above target range), but that she ate a lot more food than normal and high carb (burger sravan etc).  Otherwise all her after meal checks are below 140mg/dL.  Encouraged sticking to the carbohydrate ranges and putting other carbs into snacks.  She reports trying very hard to split the carbs up better and will start writing foods down.  Had not started food logs yet.      Intervention: Patient will start keeping track of foods, make some adjustments to the meal plan/timing of foods and check fasting BG later in the morning.      Follow up in 6 days.  Routed to referring provider.      Sharmila Unger RD, LD   Diabetes Education

## 2017-04-14 ENCOUNTER — PRENATAL OFFICE VISIT (OUTPATIENT)
Dept: OBGYN | Facility: CLINIC | Age: 27
End: 2017-04-14
Payer: COMMERCIAL

## 2017-04-14 VITALS
HEART RATE: 96 BPM | SYSTOLIC BLOOD PRESSURE: 108 MMHG | TEMPERATURE: 97.2 F | WEIGHT: 203.4 LBS | HEIGHT: 62 IN | BODY MASS INDEX: 37.43 KG/M2 | DIASTOLIC BLOOD PRESSURE: 67 MMHG

## 2017-04-14 DIAGNOSIS — Z34.83 PRENATAL CARE, SUBSEQUENT PREGNANCY, THIRD TRIMESTER: Primary | ICD-10-CM

## 2017-04-14 PROCEDURE — 99207 ZZC PRENATAL VISIT: CPT | Performed by: OBSTETRICS & GYNECOLOGY

## 2017-04-14 NOTE — NURSING NOTE
"Chief Complaint   Patient presents with     Prenatal Care       Initial /67 (BP Location: Left arm, Patient Position: Chair, Cuff Size: Adult Large)  Pulse 96  Temp 97.2  F (36.2  C) (Oral)  Ht 1.562 m (5' 1.5\")  Wt 92.3 kg (203 lb 6.4 oz)  LMP  (LMP Unknown)  BMI 37.81 kg/m2 Estimated body mass index is 37.81 kg/(m^2) as calculated from the following:    Height as of this encounter: 1.562 m (5' 1.5\").    Weight as of this encounter: 92.3 kg (203 lb 6.4 oz).  Medication Reconciliation: complete     Jose Dawkins CMA      "

## 2017-04-14 NOTE — MR AVS SNAPSHOT
After Visit Summary   4/14/2017    Mora Reardon    MRN: [id number]           Patient Information     Date Of Birth          1990        Visit Information        Provider Department      4/14/2017 8:45 AM Star Mancilla MD [facility]        Today's Diagnoses     Prenatal care, subsequent pregnancy, third trimester    -  1      Care Instructions    Per Physician's instructions          Follow-ups after your visit        Your next 10 appointments already scheduled     Apr 18, 2017  9:30 AM CDT   Diabetic Education with [place] [facility] ED RESOURCE   [facility])    [address] [address]   [phone]            Apr 28, 2017  9:00 AM CDT   ESTABLISHED PRENATAL with Savi Smart MD   [facility]    [address] [address]   [phone]            May 10, 2017  9:00 AM CDT   ESTABLISHED PRENATAL with Lina Butler MD   [facility] ([facility])    [address] [address]   [phone]            May 23, 2017  9:00 AM CDT   ESTABLISHED PRENATAL with Savi Smrat MD   [facility] ([facility])    [address] [address]   [phone]              Future tests that were ordered for you today     Open Future Orders        Priority Expected Expires Ordered    US OB Limited One Or More Fetuses Routine 7/13/2017 4/14/2018 4/14/2017            Who to contact     If you have questions or need follow up information about today's clinic visit or your schedule please contact [facility] directly at [phone].  Normal or non-critical lab and imaging results will be communicated to you by MyChart, letter or phone within 4 business days after the clinic has received the results. If you do not hear from us within 7 days,  "please contact the clinic through [employer] or phone. If you have a critical or abnormal lab result, we will notify you by phone as soon as possible.  Submit refill requests through [employer] or call your pharmacy and they will forward the refill request to us. Please allow 3 business days for your refill to be completed.          Additional Information About Your Visit        [employer] Information     [employer] lets you send messages to your doctor, view your test results, renew your prescriptions, schedule appointments and more. To sign up, go to [email or web address] . Click on \"Log in\" on the left side of the screen, which will take you to the Welcome page. Then click on \"Sign up Now\" on the right side of the page.     You will be asked to enter the access code listed below, as well as some personal information. Please follow the directions to create your username and password.     Your access code is: [email or web address]  Expires: 2017 10:37 AM     Your access code will  in 90 days. If you need help or a new code, please call your [place] clinic or [phone].        Care EveryWhere ID     This is your Care EveryWhere ID. This could be used by other organizations to access your [place] medical records  [id number]        Your Vitals Were     Pulse Temperature Height Last Period BMI (Body Mass Index)       96 97.2  F (36.2  C) (Oral) 5' 1.5\" (1.562 m) (LMP Unknown) 37.81 kg/m2        Blood Pressure from Last 3 Encounters:   17 108/67   17 120/67   17 110/59    Weight from Last 3 Encounters:   17 203 lb 6.4 oz (92.3 kg)   17 205 lb 12.8 oz (93.4 kg)   17 202 lb 3.2 oz (91.7 kg)               Primary Care Provider    [facility]       No address on file        Thank you!     Thank you for choosing [facility]  for your care. Our goal is always to provide you with excellent care. Hearing back from our patients is one way we can continue to improve " our services. Please take a few minutes to complete the written survey that you may receive in the mail after your visit with us. Thank you!             Your Updated Medication List - Protect others around you: Learn how to safely use, store and throw away your medicines at www.disposemymeds.org.          This list is accurate as of: 4/14/17  9:04 AM.  Always use your most recent med list.                   Brand Name Dispense Instructions for use    blood glucose monitoring lancets     1 Box    Use to test blood sugar 4 times daily       blood glucose monitoring test strip    GLUCOCARD VITAL TEST    50 each    Use to test blood sugar 4 times daily       CVS PRENATAL GUMMY PO          ofloxacin 0.3 % otic solution    FLOXIN    10 mL    Place 10 drops Into the left ear 2 times daily for 7 days       potassium chloride 20 MEQ Packet    KLOR-CON    90 tablet    Take 20 mEq by mouth daily       TYLENOL PO      Take 500 mg by mouth

## 2017-04-14 NOTE — PROGRESS NOTES
"Doing well.   Blood sugars have been normal for the most part; only a couple of high elevations in the 180s for postprandials and occasional elevated fasting blood glucose levels since she snacks at 2 am.   Has been documenting the blood sugar levels and also what she eats.   Missed her diabetic educator appt earlier in the month for review of levels, but has one scheduled for .   Otherwise, denies VB, ctx, LOF. +FM  /67 (BP Location: Left arm, Patient Position: Chair, Cuff Size: Adult Large)  Pulse 96  Temp 97.2  F (36.2  C) (Oral)  Ht 5' 1.5\" (1.562 m)  Wt 203 lb 6.4 oz (92.3 kg)  LMP  (LMP Unknown)  BMI 37.81 kg/m2  General Appearance: NAD  Abdomen: Gravid, NT  Refer to flow sheet above.   A/P: 26 year old  at 31w3d  -- growth US ordered for persistent S>D  -- encouraged to re-convene with diabetic educator to assess for possible initiation of medication  -- PTL precautions reviewed  RTC in 2 weeks    Star Mancilla MD  [facility]            "

## 2017-04-19 ENCOUNTER — TELEPHONE (OUTPATIENT)
Dept: EDUCATION SERVICES | Facility: CLINIC | Age: 27
End: 2017-04-19

## 2017-04-19 ENCOUNTER — HOSPITAL ENCOUNTER (OUTPATIENT)
Dept: ULTRASOUND IMAGING | Facility: CLINIC | Age: 27
Discharge: HOME | End: 2017-04-19
Attending: OBSTETRICS & GYNECOLOGY | Admitting: OBSTETRICS & GYNECOLOGY
Payer: COMMERCIAL

## 2017-04-19 DIAGNOSIS — Z34.83 PRENATAL CARE, SUBSEQUENT PREGNANCY, THIRD TRIMESTER: ICD-10-CM

## 2017-04-19 PROCEDURE — 76816 OB US FOLLOW-UP PER FETUS: CPT

## 2017-04-19 NOTE — TELEPHONE ENCOUNTER
"Called out to patient today to follow up on blood sugars after 2 x no show for follow up diabetes education appointments.  Was able to get a hold of patient 2:07 PM 4/19/2017.  Patient agreed to send in a picture of blood sugars tonight.  Reports they are \"high today, otherwise okay\".  Discussed need that if they continue to run high we need to update the OB/MD incase a medication is needed to help control BGs during pregnancy.     Sharmila Unger RD, LD   Diabetes Education    "

## 2017-04-20 ENCOUNTER — TELEPHONE (OUTPATIENT)
Dept: EDUCATION SERVICES | Facility: CLINIC | Age: 27
End: 2017-04-20

## 2017-04-20 ENCOUNTER — VIRTUAL VISIT (OUTPATIENT)
Dept: EDUCATION SERVICES | Facility: CLINIC | Age: 27
End: 2017-04-20

## 2017-04-20 DIAGNOSIS — Z34.83 PRENATAL CARE, SUBSEQUENT PREGNANCY, THIRD TRIMESTER: ICD-10-CM

## 2017-04-20 DIAGNOSIS — O99.810 ABNORMAL MATERNAL GLUCOSE TOLERANCE, ANTEPARTUM: ICD-10-CM

## 2017-04-20 DIAGNOSIS — Z34.83 PRENATAL CARE, SUBSEQUENT PREGNANCY, THIRD TRIMESTER: Primary | ICD-10-CM

## 2017-04-20 NOTE — TELEPHONE ENCOUNTER
Hi Dr. Smart, Dr. Mancilla and Dr. Butler,     Just a quick update on Mora.  She missed 2 x appointments for follow up with diabetes education, but she did e-mail in her blood sugars last night at my request (see below).     1.  I am not sure how to evaluate fasting bgs.  She eats over night at 3am and had been checking at 5am.  So technically in goal if we consider this number to be a 2 hour after a meal check.  I asked her to check at 8am before she eats breakfast to get a more accurate fasting value, but have not heard back from her if she started doing this.      2.  Her after meal checks are mostly in target and carb counting seems to be working, but will have the sporadic 170/180 with a large meal.  After breakfast: 100% in target (11/11 in target)  After lunch: 80% in target. (8/10 in target)  After dinner: 72% in target. (8/11 in target)    3.  I did not have her start checking ketones, do you want her to?  I didn't know if she officially had the diagnosis of GDM or not with not having done the 3 hour.       Her next follow up is with Dr. Smart in 8 days.   I reached out to Mora to find out about the morning numbers.  If she has been fasting for 5 hours (3am to 8am) these would be running high.       Thanks so much!   Nga Unger RD, LD   Diabetes Education

## 2017-04-20 NOTE — TELEPHONE ENCOUNTER
Gestational Diabetes Follow-up    Subjective/Objective:    Mora Reardon sent in blood glucose log for review. Last date of communication was: 04/19/2017.    Blood sugar is being managed with diet and activity    Estimated Date of Delivery: Jun 13, 2017    BG/Food Log:   Sent in on the evening of 4/19/17          Assessment:  Ketones: was not testing yet without GDM diagnosis     Fasting blood glucoses: 0% in target - however was eating a snack at 3am and checking blood sugar at 5am. Asked patient to push out fasting BG to 8am instead, unsure if she has done this and asked again.  After breakfast: 100% in target (11/11 in target)  After lunch: 80% in target. (8/10 in target)  After dinner: 72% in target. (8/11 in target)    Plan/Response:  If patient has spread out the snack and fasting blood sugars remain still high recommend having patient come back in for insulin training and will route potential plan to OB/MD.   Will also ask if they want to have dx of GDM added and for patient to check ketones.   Mealtimes have sporadic high blood sugars related to intake, but overall are mostly in target.  Continue to encourage diet/meal plan adherence.  Could consider meal time insulin PRN if planning on eating more, but does not appear to need it when the meal plan if followed.      E-mail to patient 10:01 AM 4/20/2017     Michelet Whiting,   Asad for sending in your blood sugars.  It looks like the carb counting is helping to keep your after meal blood sugars better.  When you notice the numbers over 140 can you relate this with a larger meal?        Also, did you start checking your fasting blood sugars later in the morning versus 5am so that this reading is further away from the snack at 3am?     If your blood sugars are still running above 95mg/dL and they are spread out from your snack by 5 hours we may need to look at a medication to help bring them down.  Sometimes these are the hardest numbers to keep in control  because hormones are stronger at this time.    I will update your OB/MD once I hear back from  you.       Thanks again and I hope you are having a great day!   Nga     Any diabetes medication dose changes were made via the CDE Protocol and Collaborative Practice Agreement with the patient's referring provider. A copy of this encounter was shared with the provider.

## 2017-04-20 NOTE — PROGRESS NOTES
Please see telephone encounter dated 04/20/2017 for details.    Sharmila Unger RD, LD   Diabetes Education

## 2017-04-20 NOTE — PROGRESS NOTES
Growth US reviewed. EFW at 84%ile. YASMEEN normal.   Will review at next follow-up visit.     Star Mancilla MD  [facility]

## 2017-04-20 NOTE — MR AVS SNAPSHOT
After Visit Summary   4/20/2017    Mora Reardon    MRN: [id number]           Patient Information     Date Of Birth          1990        Visit Information        Provider Department      4/20/2017 10:30 AM  DIABETIC ED RESOURCE [facility]        Today's Diagnoses     Prenatal care, subsequent pregnancy, third trimester    -  1    Abnormal maternal glucose tolerance, antepartum           Follow-ups after your visit        Your next 10 appointments already scheduled     Apr 28, 2017  9:00 AM CDT   ESTABLISHED PR[facility]ATAL with Savi Smart MD   [facility]    [address] [address]   [phone]            May 10, 2017  9:00 AM CDT   ESTABLISHED PRENATAL with Lina Butler MD   [facility]    [address] [address]   [phone]            May 23, 2017  9:00 AM CDT   ESTABLISHED PRENATAL with Savi Smart MD   [facility]    [address] [address]   [phone]              Future tests that were ordered for you today     Open Future Orders        Priority Expected Expires Ordered    US OB Ltd One Or More Fetus FU/Repeat Routine 7/13/2017 4/14/2018 4/14/2017            Who to contact     If you have questions or need follow up information about today's clinic visit or your schedule please contact [facility] directly at [phone].  Normal or non-critical lab and imaging results will be communicated to you by MyChart, letter or phone within 4 business days after the clinic has received the results. If you do not hear from us within 7 days, please contact the clinic through MyChart or phone. If you have a critical or abnormal lab result, we will notify you by phone as soon as possible.  Submit refill requests through [email or web address] or call your  "pharmacy and they will forward the refill request to us. Please allow 3 business days for your refill to be completed.          Additional Information About Your Visit        MyChart Information     [employer] lets you send messages to your doctor, view your test results, renew your prescriptions, schedule appointments and more. To sign up, go to [email or web address] . Click on \"Log in\" on the left side of the screen, which will take you to the Welcome page. Then click on \"Sign up Now\" on the right side of the page.     You will be asked to enter the access code listed below, as well as some personal information. Please follow the directions to create your username and password.     Your access code is: [email or web address]  Expires: 2017 10:37 AM     Your access code will  in 90 days. If you need help or a new code, please call your [place] clinic or [phone].        Care EveryWhere ID     This is your Care EveryWhere ID. This could be used by other organizations to access your [place] medical records  [id number]        Your Vitals Were     Last Period                   (LMP Unknown)            Blood Pressure from Last 3 Encounters:   17 108/67   17 120/67   17 110/59    Weight from Last 3 Encounters:   17 203 lb 6.4 oz (92.3 kg)   17 205 lb 12.8 oz (93.4 kg)   17 202 lb 3.2 oz (91.7 kg)              Today, you had the following     No orders found for display       Primary Care Provider    [facility]       No address on file        Thank you!     Thank you for choosing [facility]  for your care. Our goal is always to provide you with excellent care. Hearing back from our patients is one way we can continue to improve our services. Please take a few minutes to complete the written survey that you may receive in the mail after your visit with us. Thank you!             Your Updated Medication List - Protect others around you: Learn how " to safely use, store and throw away your medicines at www.disposemymeds.org.          This list is accurate as of: 4/20/17 12:07 PM.  Always use your most recent med list.                   Brand Name Dispense Instructions for use    blood glucose monitoring lancets     1 Box    Use to test blood sugar 4 times daily       blood glucose monitoring test strip    GLUCOCARD VITAL TEST    50 each    Use to test blood sugar 4 times daily       CVS PRENATAL GUMMY PO          potassium chloride 20 MEQ Packet    KLOR-CON    90 tablet    Take 20 mEq by mouth daily       TYLENOL PO      Take 500 mg by mouth

## 2017-04-21 ENCOUNTER — TELEPHONE (OUTPATIENT)
Dept: OBGYN | Facility: CLINIC | Age: 27
End: 2017-04-21

## 2017-04-21 ENCOUNTER — VIRTUAL VISIT (OUTPATIENT)
Dept: EDUCATION SERVICES | Facility: CLINIC | Age: 27
End: 2017-04-21
Payer: COMMERCIAL

## 2017-04-21 DIAGNOSIS — O99.810 ABNORMAL MATERNAL GLUCOSE TOLERANCE, ANTEPARTUM: Primary | ICD-10-CM

## 2017-04-21 DIAGNOSIS — O99.810 ABNORMAL MATERNAL GLUCOSE TOLERANCE, ANTEPARTUM: ICD-10-CM

## 2017-04-21 DIAGNOSIS — Z34.83 PRENATAL CARE, SUBSEQUENT PREGNANCY, THIRD TRIMESTER: Primary | ICD-10-CM

## 2017-04-21 PROCEDURE — 99207 ZZC NO BILLABLE SERVICE THIS VISIT: CPT

## 2017-04-21 NOTE — TELEPHONE ENCOUNTER
"Incoming E-mail from patient 4/21/2017 at 6am    Yes larger meal at that time just started checking at 8am or so and I need more strip sent to [facility] in [place] if possible I'm out after I check this morning thanks      E-mail reply from educator:   Michelet Whiting,   Thanks for replying.   I can send a new prescription for test strips.  It would be much cheaper at a [place] pharmacy though - I could send to the [facility] pharmacy, which is right across [address], so very close to [facility].    Please let me know if that works.  I think at [facility] 100 strips might be 70$ versus in the 20$ range at a [place] pharmacy.     The last fasting blood sugar you sent in was from Friday the 14th at 112 (so was this check still within 2-3 hours of the overnight snack?).   Please send in your fasting values on Monday and we will see what they are running.  If you are pushing the blood sugar check out untill 8/9am then it should be a full 5-6 hours after the overnight snack and give us a more accurate \"fasting\" value.      Thank you!   Please e-mail or call if you want the prescription send to the [place] pharmacy instead.      The number to my office today is [phone] (until 2:00)or you can call the triage line at [phone] or you can reply to this email until 3pm.      Nga   "

## 2017-04-21 NOTE — TELEPHONE ENCOUNTER
Incoming E-mail from patient 4/21/2017 at 6am  [facility] 2-3hrs after snack     E-mail reply from educator:   Thank you Mora!  Prescription was sent to [place] with re-fills.       Sharmila Unger RD, LD   Diabetes Education

## 2017-04-21 NOTE — MR AVS SNAPSHOT
After Visit Summary   4/21/2017    Mora Reardon    MRN: [id number]           Patient Information     Date Of Birth          1990        Visit Information        Provider Department      4/21/2017 11:00 AM [facility]        Today's Diagnoses     Prenatal care, subsequent pregnancy, third trimester    -  1    Abnormal maternal glucose tolerance, antepartum           Follow-ups after your visit        Your next 10 appointments already scheduled     Apr 21, 2017 11:00 AM CDT   Telephone Visit with [facility] DIABETIC ED RESOURCE   [facility] ([facility])    [address] [place] [address]   [phone]           Note: this is not an onsite visit; there is no need to come to the facility.            Apr 28, 2017  9:00 AM CDT   ESTABLISHED PRENATAL with Savi Smart MD   [facility] ([facility]    [address] [address]   [phone]            May 10, 2017  9:00 AM CDT   ESTABLISHED PRENATAL with Lina Butler MD   [facility] ([facility]    [address] [address]   [phone]            May 23, 2017  9:00 AM CDT   ESTABLISHED PRENATAL with Savi Smart MD   [facility] ([facility])    [address] [address]   [phone]              Who to contact     If you have questions or need follow up information about today's clinic visit or your schedule please contact [facility] directly at [phone].  Normal or non-critical lab and imaging results will be communicated to you by MyChart, letter or phone within 4 business days after the clinic has received the results. If you do not hear from us within 7 days, please contact the clinic through MyChart or phone. If you have a critical or abnormal  "lab result, we will notify you by phone as soon as possible.  Submit refill requests through [employer] or call your pharmacy and they will forward the refill request to us. Please allow 3 business days for your refill to be completed.          Additional Information About Your Visit        MyChart Information     [employer] lets you send messages to your doctor, view your test results, renew your prescriptions, schedule appointments and more. To sign up, go to [email or web address] . Click on \"Log in\" on the left side of the screen, which will take you to the Welcome page. Then click on \"Sign up Now\" on the right side of the page.     You will be asked to enter the access code listed below, as well as some personal information. Please follow the directions to create your username and password.     Your access code is: [email or web address]  Expires: 2017 10:37 AM     Your access code will  in 90 days. If you need help or a new code, please call your [facility] or [phone].        Care EveryWhere ID     This is your Care EveryWhere ID. This could be used by other organizations to access your [facility] medical records  [id number]        Your Vitals Were     Last Period                   (LMP Unknown)            Blood Pressure from Last 3 Encounters:   17 108/67   17 120/67   17 110/59    Weight from Last 3 Encounters:   17 203 lb 6.4 oz (92.3 kg)   17 205 lb 12.8 oz (93.4 kg)   17 202 lb 3.2 oz (91.7 kg)              Today, you had the following     No orders found for display       Primary Care Provider    [facility]       No address on file        Thank you!     Thank you for choosing [facility]  for your care. Our goal is always to provide you with excellent care. Hearing back from our patients is one way we can continue to improve our services. Please take a few minutes to complete the written survey that you may receive in the mail " after your visit with us. Thank you!             Your Updated Medication List - Protect others around you: Learn how to safely use, store and throw away your medicines at www.disposemymeds.org.          This list is accurate as of: 4/21/17  9:43 AM.  Always use your most recent med list.                   Brand Name Dispense Instructions for use    blood glucose monitoring lancets     1 Box    Use to test blood sugar 4 times daily       blood glucose monitoring test strip    GLUCOCARD VITAL TEST    50 each    Use to test blood sugar 4 times daily       CVS PRENATAL GUMMY PO          potassium chloride 20 MEQ Packet    KLOR-CON    90 tablet    Take 20 mEq by mouth daily       TYLENOL PO      Take 500 mg by mouth

## 2017-04-21 NOTE — TELEPHONE ENCOUNTER
Pt's test strips are no longer covered they are requiring Onetouch verio, freestyle, or precision brands. Could we get a new rx for a meter, strip , lancets, control solution

## 2017-04-24 RX ORDER — BLOOD-GLUCOSE CONTROL, NORMAL
EACH MISCELLANEOUS
Qty: 1 EACH | Refills: 3 | Status: SHIPPED | OUTPATIENT
Start: 2017-04-24 | End: 2017-09-26

## 2017-04-24 NOTE — TELEPHONE ENCOUNTER
New prescription's sent per written order of Dr. Dwyer.      Please order the recommended supplies as requested   Thanks   Lina Arguello   [facility]  RN

## 2017-04-28 ENCOUNTER — TELEPHONE (OUTPATIENT)
Dept: OBGYN | Facility: CLINIC | Age: 27
End: 2017-04-28

## 2017-04-28 ENCOUNTER — PRENATAL OFFICE VISIT (OUTPATIENT)
Dept: OBGYN | Facility: CLINIC | Age: 27
End: 2017-04-28
Payer: COMMERCIAL

## 2017-04-28 VITALS
WEIGHT: 207 LBS | SYSTOLIC BLOOD PRESSURE: 109 MMHG | HEART RATE: 95 BPM | BODY MASS INDEX: 38.09 KG/M2 | HEIGHT: 62 IN | DIASTOLIC BLOOD PRESSURE: 61 MMHG

## 2017-04-28 DIAGNOSIS — E87.6 HYPOPOTASSEMIA: ICD-10-CM

## 2017-04-28 DIAGNOSIS — O99.810 ABNORMAL MATERNAL GLUCOSE TOLERANCE, ANTEPARTUM: ICD-10-CM

## 2017-04-28 PROCEDURE — 99207 ZZC PRENATAL VISIT: CPT | Performed by: OBSTETRICS & GYNECOLOGY

## 2017-04-28 RX ORDER — POTASSIUM CHLORIDE 1.5 G/1.58G
20 POWDER, FOR SOLUTION ORAL DAILY
Qty: 90 TABLET | Refills: 3 | Status: SHIPPED | OUTPATIENT
Start: 2017-04-28 | End: 2017-09-26

## 2017-04-28 NOTE — TELEPHONE ENCOUNTER
Reason for Call:  Other     Detailed comments: Rx was written for potassium chloride 20 mEq packet. Pharmacy states this is very expensive ($700+) - can this be changed to tablets instead?     Phone Number Patient can be reached at: [phone] - [facility]    Best Time: Any    Can we leave a detailed message on this number? Yes    Call taken on 4/28/2017 at 10:05 AM by Denise Behrendt

## 2017-04-28 NOTE — NURSING NOTE
"Chief Complaint   Patient presents with     Prenatal Care       Initial /61 (BP Location: Right arm, Patient Position: Chair, Cuff Size: Adult Large)  Pulse 95  Ht 5' 1.5\" (1.562 m)  Wt 207 lb (93.9 kg)  LMP  (LMP Unknown)  Breastfeeding? No  BMI 38.48 kg/m2 Estimated body mass index is 38.48 kg/(m^2) as calculated from the following:    Height as of this encounter: 5' 1.5\" (1.562 m).    Weight as of this encounter: 207 lb (93.9 kg).  Medication Reconciliation: complete   Janet Gibbons CMA      "

## 2017-04-28 NOTE — PROGRESS NOTES
BGs are 115 in th emorning, but this is usually 2 hours after eating (she wakes up at 4am usually to eat).  1h postprandial values are 120s.  She does not have a log book with her today.  Is not taking her potassium, hasn't seen the diabetic educator.  +FM, rare bouts of ctx, no VB or LOF.    26 year old  at 33w3d   - S>D - had US on  showing YASMEEN of 19 and EFW 84%ile.  Has gained 42 pounds thus far.  Has uncontrolled BGs.  Consider repeat US closer to her due date.  - recommend following up with diabetic educator, may need to initiate insulin depending on her fasting values.  - refilled klor con - recommend endocrinology visit as well  - GBS and cvx chk next visit  - discussed s/s of PTL, gave # for BC    RTC 2 weeks     Savi Smart MD, MPH  [facility]

## 2017-04-28 NOTE — MR AVS SNAPSHOT
"              After Visit Summary   4/28/2017    Mora Reardon    MRN: [id number]           Patient Information     Date Of Birth          1990        Visit Information        Provider Department      4/28/2017 9:00 AM Savi Smart MD [facility]        Today's Diagnoses     Abnormal maternal glucose tolerance, antepartum        Bartter's syndrome           Follow-ups after your visit        Your next 10 appointments already scheduled     May 10, 2017  9:00 AM CDT   ESTABLISHED PRENATAL with Lina Butler MD   [facility]    [address] [address]   [phone]            May 23, 2017  9:00 AM CDT   ESTABLISHED PRENATAL with Savi Smart MD   [facility]    [address] [address]   [phone]              Who to contact     If you have questions or need follow up information about today's clinic visit or your schedule please contact [facility] directly at [phone].  Normal or non-critical lab and imaging results will be communicated to you by MyChart, letter or phone within 4 business days after the clinic has received the results. If you do not hear from us within 7 days, please contact the clinic through [employer] or phone. If you have a critical or abnormal lab result, we will notify you by phone as soon as possible.  Submit refill requests through [employer] or call your pharmacy and they will forward the refill request to us. Please allow 3 business days for your refill to be completed.          Additional Information About Your Visit        MyChart Information     [employer] lets you send messages to your doctor, view your test results, renew your prescriptions, schedule appointments and more. To sign up, go to [email or web address] . Click on \"Log in\" on the left side of the screen, which will take you to the " "Welcome page. Then click on \"Sign up Now\" on the right side of the page.     You will be asked to enter the access code listed below, as well as some personal information. Please follow the directions to create your username and password.     Your access code is: [email or web address]  Expires: 2017 10:37 AM     Your access code will  in 90 days. If you need help or a new code, please call your [facility] or [phone].        Care EveryWhere ID     This is your Care EveryWhere ID. This could be used by other organizations to access your [facility] medical records  [id number]        Your Vitals Were     Pulse Height Last Period Breastfeeding? BMI (Body Mass Index)       95 5' 1.5\" (1.562 m) (LMP Unknown) No 38.48 kg/m2        Blood Pressure from Last 3 Encounters:   17 109/61   17 108/67   17 120/67    Weight from Last 3 Encounters:   17 207 lb (93.9 kg)   17 203 lb 6.4 oz (92.3 kg)   17 205 lb 12.8 oz (93.4 kg)              Today, you had the following     No orders found for display         Where to get your medicines      These medications were sent to [facility] [address] [address]     Phone:  [phone]     potassium chloride 20 MEQ Packet          Primary Care Provider    [facility]       No address on file        Thank you!     Thank you for choosing [facility]  for your care. Our goal is always to provide you with excellent care. Hearing back from our patients is one way we can continue to improve our services. Please take a few minutes to complete the written survey that you may receive in the mail after your visit with us. Thank you!             Your Updated Medication List - Protect others around you: Learn how to safely use, store and throw away your medicines at www.disposemymeds.org.          This list is accurate as of: 17 12:43 PM.  Always " use your most recent med list.                   Brand Name Dispense Instructions for use    blood glucose calibration solution    no brand specified    1 each    Use to calibrate blood glucose monitor as directed.       blood glucose lancets standard    no brand specified    2 Box    Use to test blood sugar 4 times daily or as directed.       blood glucose monitoring lancets     1 Box    Use to test blood sugar 4 times daily       blood glucose monitoring meter device kit    no brand specified    1 kit    Use to test blood sugar 4 times daily or as directed.       * blood glucose monitoring test strip    GLUCOCARD VITAL TEST    150 each    Use to test blood sugar 4 times daily or as directed.       * blood glucose monitoring test strip    no brand specified    150 strip    Use to test blood sugars 4 times daily or as directed       CVS PRENATAL GUMMY PO          potassium chloride 20 MEQ Packet    KLOR-CON    90 tablet    Take 20 mEq by mouth daily       TYLENOL PO      Take 500 mg by mouth       * Notice:  This list has 2 medication(s) that are the same as other medications prescribed for you. Read the directions carefully, and ask your doctor or other care provider to review them with you.

## 2017-05-04 ENCOUNTER — TELEPHONE (OUTPATIENT)
Dept: EDUCATION SERVICES | Facility: CLINIC | Age: 27
End: 2017-05-04

## 2017-05-04 NOTE — TELEPHONE ENCOUNTER
Gestational Diabetes Follow Up;    E-mail sent requesting return; e-mail or phone call to diabetes educators.  Requesting current blood sugars,and list of any foods consumed prior to any elevated blood sugars.     Last date of Mora Reardon's communication was: 04/21/2017.    Gestational diabetes is being managed with diet and activity    Estimated Date of Delivery: Jun 13, 2017    E-mail: [email or web address]  Phone: [phone]  Fax: [phone]

## 2017-05-10 ENCOUNTER — PRENATAL OFFICE VISIT (OUTPATIENT)
Dept: OBGYN | Facility: CLINIC | Age: 27
End: 2017-05-10
Payer: COMMERCIAL

## 2017-05-10 VITALS
DIASTOLIC BLOOD PRESSURE: 65 MMHG | BODY MASS INDEX: 39.08 KG/M2 | HEIGHT: 62 IN | WEIGHT: 212.4 LBS | SYSTOLIC BLOOD PRESSURE: 128 MMHG | HEART RATE: 108 BPM

## 2017-05-10 DIAGNOSIS — Z34.80 PRENATAL CARE, SUBSEQUENT PREGNANCY, UNSPECIFIED TRIMESTER: Primary | ICD-10-CM

## 2017-05-10 DIAGNOSIS — O26.843 UTERINE SIZE DATE DISCREPANCY, THIRD TRIMESTER: ICD-10-CM

## 2017-05-10 DIAGNOSIS — E87.6 HYPOPOTASSEMIA: ICD-10-CM

## 2017-05-10 DIAGNOSIS — O99.810 ABNORMAL MATERNAL GLUCOSE TOLERANCE, ANTEPARTUM: ICD-10-CM

## 2017-05-10 LAB
ANION GAP SERPL CALCULATED.3IONS-SCNC: 11 MMOL/L (ref 3–14)
BUN SERPL-MCNC: 9 MG/DL (ref 7–30)
CALCIUM SERPL-MCNC: 9.1 MG/DL (ref 8.5–10.1)
CHLORIDE SERPL-SCNC: 99 MMOL/L (ref 94–109)
CO2 SERPL-SCNC: 25 MMOL/L (ref 20–32)
CREAT SERPL-MCNC: 0.69 MG/DL (ref 0.52–1.04)
GFR SERPL CREATININE-BSD FRML MDRD: ABNORMAL ML/MIN/1.7M2
GLUCOSE SERPL-MCNC: 65 MG/DL (ref 70–99)
POTASSIUM SERPL-SCNC: 3.5 MMOL/L (ref 3.4–5.3)
SODIUM SERPL-SCNC: 135 MMOL/L (ref 133–144)

## 2017-05-10 PROCEDURE — 87653 STREP B DNA AMP PROBE: CPT | Performed by: OBSTETRICS & GYNECOLOGY

## 2017-05-10 PROCEDURE — 36415 COLL VENOUS BLD VENIPUNCTURE: CPT | Performed by: OBSTETRICS & GYNECOLOGY

## 2017-05-10 PROCEDURE — 80048 BASIC METABOLIC PNL TOTAL CA: CPT | Performed by: OBSTETRICS & GYNECOLOGY

## 2017-05-10 PROCEDURE — 99207 ZZC COMPLICATED OB VISIT: CPT | Performed by: OBSTETRICS & GYNECOLOGY

## 2017-05-10 RX ORDER — GLYBURIDE 1.25 MG/1
1.25 TABLET ORAL
Qty: 30 TABLET | Refills: 1 | Status: ON HOLD | OUTPATIENT
Start: 2017-05-10 | End: 2017-05-28

## 2017-05-10 NOTE — PROGRESS NOTES
"CC: prenatal  Discharge-whitish  GDM-poor control  Low potassium disorder-on potassium but hasn't been compliant  History of drug use-denies any during pregnancy  S:  Some patsy rodrigues.  Whitish discharge more today.  No lof/vb.  Good fm.     Postprandial 113-120 usually abnormal glucose levels are in the morning.   A/P GDM A-2 discussed recommendation for insulin-she refuses.  Option offered to try glyburide 1.25 mg at the evening with dinner. Goals is less than 95 for fasting and postprandial less than 120.  Risks to the pregnancy and the baby discussed.  39 weeks induction or delivery.    Also start weekly BPP  Weekly appts  Also discussed possibility with poor compliance of  if baby is too large at delivery.  Risks of shoulder dystocia and prior vacuum assisted delivery with need to go to [facility] for \"fixing the babies head\"  Routine precautions  Low potassium-Chem today and recommended restarting the potassium, endocrinology referral  Anemia-recommended to start iron but hasn't  Smoking-restarted, recommended cessation  Lina Butler MD    "

## 2017-05-10 NOTE — MR AVS SNAPSHOT
"              After Visit Summary   5/10/2017    Mora Reardon    MRN: [id number]           Patient Information     Date Of Birth          1990        Visit Information        Provider Department      5/10/2017 4:15 PM Lina Butler MD [facility]        Today's Diagnoses     Prenatal care, subsequent pregnancy, unspecified trimester    -  1    Uterine size date discrepancy, third trimester        Abnormal maternal glucose tolerance, antepartum        Hypopotassemia          Care Instructions    A/P GDM A-2 discussed recommendation for insulin-she refuses.  Option offered to try glyburide at the evening with dinner. Goals is less than 95 for fasting and postprandial less than 120.  Risks to the pregnancy and the baby discussed.    Also start weekly BPP  Weekly appts  Also discussed possibility with poor compliance of  if baby is too large at delivery.  Risks of shoulder dystocia and prior vacuum assisted delivery with need to go to [facility] for \"fixing the babies head\"  Routine precautions  Low potassium-Chem today and recommended restarting the potassium, endocrinology referral  Anemia-recommended to start iron but hasn't    Lina Butler MD          Follow-ups after your visit        Additional Services     ENDOCRINOLOGY ADULT REFERRAL       Your provider has referred you to: [facility]: Endocrinology and Diabetes Clinic - [place] [phone]   [email or web address]      Please be aware that coverage of these services is subject to the terms and limitations of your health insurance plan.  Call member services at your health plan with any benefit or coverage questions.      Please bring the following to your appointment:    >>   Any x-rays, CTs or MRIs which have been performed.  Contact the facility where they were done to arrange for  prior to your scheduled appointment.    >>   List of current " "medications   >>   This referral request   >>   Any documents/labs given to you for this referral                  Your next 10 appointments already scheduled     May 23, 2017  9:00 AM CDT   ESTABLISHED PRENATAL with Savi Smart MD   [facility]    [address] [place]  [facility] [address]   [phone]              Future tests that were ordered for you today     Open Future Orders        Priority Expected Expires Ordered    US OB Ltd One Or More Fetus FU/Repeat Routine 8/8/2017 5/10/2018 5/10/2017            Who to contact     If you have questions or need follow up information about today's clinic visit or your schedule please contact [facility] directly at [phone].  Normal or non-critical lab and imaging results will be communicated to you by MyChart, letter or phone within 4 business days after the clinic has received the results. If you do not hear from us within 7 days, please contact the clinic through MyChart or phone. If you have a critical or abnormal lab result, we will notify you by phone as soon as possible.  Submit refill requests through [employer] or call your pharmacy and they will forward the refill request to us. Please allow 3 business days for your refill to be completed.          Additional Information About Your Visit        [employer] Information     [employer] lets you send messages to your doctor, view your test results, renew your prescriptions, schedule appointments and more. To sign up, go to [email or web address] . Click on \"Log in\" on the left side of the screen, which will take you to the Welcome page. Then click on \"Sign up Now\" on the right side of the page.     You will be asked to enter the access code listed below, as well as some personal information. Please follow the directions to create your username and password.     Your access code is: [email or web address]  Expires: 6/1/2017 10:37 AM     Your access code will " " in 90 days. If you need help or a new code, please call your [facility] or [phone].        Care EveryWhere ID     This is your Care EveryWhere ID. This could be used by other organizations to access your [facility] medical records  [id number]        Your Vitals Were     Pulse Height Last Period BMI (Body Mass Index)          108 5' 1.5\" (1.562 m) (LMP Unknown) 39.48 kg/m2         Blood Pressure from Last 3 Encounters:   05/10/17 128/65   17 109/61   17 108/67    Weight from Last 3 Encounters:   05/10/17 212 lb 6.4 oz (96.3 kg)   17 207 lb (93.9 kg)   17 203 lb 6.4 oz (92.3 kg)              We Performed the Following     Basic metabolic panel  (Ca, Cl, CO2, Creat, Gluc, K, Na, BUN)     ENDOCRINOLOGY ADULT REFERRAL     Strep, Group B by PCR          Today's Medication Changes          These changes are accurate as of: 5/10/17  4:39 PM.  If you have any questions, ask your nurse or doctor.               Start taking these medicines.        Dose/Directions    glyBURIDE 1.25 MG tablet   Commonly known as:  DIABETA /MICRONASE   Used for:  Abnormal maternal glucose tolerance, antepartum, Uterine size date discrepancy, third trimester, Prenatal care, subsequent pregnancy, unspecified trimester   Started by:  Lina Butler MD        Dose:  1.25 mg   Take 1 tablet (1.25 mg) by mouth daily (with dinner)   Quantity:  30 tablet   Refills:  1            Where to get your medicines      These medications were sent to [id number] [address]  [address] [address]    Hours:  Closed 10-16-08 business to [facility] Phone:  [phone]     glyBURIDE 1.25 MG tablet                Primary Care Provider    [facility]       No address on file        Thank you!     Thank you for choosing [facility]  for your care. Our goal is always to provide you with excellent care. Hearing back from our " patients is one way we can continue to improve our services. Please take a few minutes to complete the written survey that you may receive in the mail after your visit with us. Thank you!             Your Updated Medication List - Protect others around you: Learn how to safely use, store and throw away your medicines at www.disposemymeds.org.          This list is accurate as of: 5/10/17  4:39 PM.  Always use your most recent med list.                   Brand Name Dispense Instructions for use    blood glucose calibration solution    no brand specified    1 each    Use to calibrate blood glucose monitor as directed.       blood glucose lancets standard    no brand specified    2 Box    Use to test blood sugar 4 times daily or as directed.       blood glucose monitoring lancets     1 Box    Use to test blood sugar 4 times daily       blood glucose monitoring meter device kit    no brand specified    1 kit    Use to test blood sugar 4 times daily or as directed.       * blood glucose monitoring test strip    GLUCOCARD VITAL TEST    150 each    Use to test blood sugar 4 times daily or as directed.       * blood glucose monitoring test strip    no brand specified    150 strip    Use to test blood sugars 4 times daily or as directed       CVS PRENATAL GUMMY PO          glyBURIDE 1.25 MG tablet    DIABETA /MICRONASE    30 tablet    Take 1 tablet (1.25 mg) by mouth daily (with dinner)       potassium chloride 20 MEQ Packet    KLOR-CON    90 tablet    Take 20 mEq by mouth daily       TYLENOL PO      Take 500 mg by mouth Reported on 5/10/2017       * Notice:  This list has 2 medication(s) that are the same as other medications prescribed for you. Read the directions carefully, and ask your doctor or other care provider to review them with you.

## 2017-05-10 NOTE — NURSING NOTE
"Chief Complaint   Patient presents with     Prenatal Care     fluid leaking       Initial /65  Pulse 108  Ht 5' 1.5\" (1.562 m)  Wt 212 lb 6.4 oz (96.3 kg)  LMP  (LMP Unknown)  BMI 39.48 kg/m2 Estimated body mass index is 39.48 kg/(m^2) as calculated from the following:    Height as of this encounter: 5' 1.5\" (1.562 m).    Weight as of this encounter: 212 lb 6.4 oz (96.3 kg).  Medication Reconciliation: complete     Karoline Jasso LPN      "

## 2017-05-10 NOTE — PATIENT INSTRUCTIONS
"A/P GDM A-2 discussed recommendation for insulin-she refuses.  Option offered to try glyburide at the evening with dinner. Goals is less than 95 for fasting and postprandial less than 120.  Risks to the pregnancy and the baby discussed.    Also start weekly BPP  Weekly appts  Also discussed possibility with poor compliance of  if baby is too large at delivery.  Risks of shoulder dystocia and prior vacuum assisted delivery with need to go to [facility] for \"fixing the babies head\"  Routine precautions  Low potassium-Chem today and recommended restarting the potassium, endocrinology referral  Anemia-recommended to start iron but hasn't    Lina Butler MD    "

## 2017-05-11 LAB
GP B STREP DNA SPEC QL NAA+PROBE: NORMAL
SPECIMEN SOURCE: NORMAL

## 2017-05-12 ENCOUNTER — HOSPITAL ENCOUNTER (OUTPATIENT)
Dept: ULTRASOUND IMAGING | Facility: CLINIC | Age: 27
Discharge: HOME | End: 2017-05-12
Attending: OBSTETRICS & GYNECOLOGY | Admitting: OBSTETRICS & GYNECOLOGY
Payer: COMMERCIAL

## 2017-05-12 DIAGNOSIS — Z34.80 PRENATAL CARE, SUBSEQUENT PREGNANCY, UNSPECIFIED TRIMESTER: ICD-10-CM

## 2017-05-12 DIAGNOSIS — O99.810 ABNORMAL MATERNAL GLUCOSE TOLERANCE, ANTEPARTUM: ICD-10-CM

## 2017-05-12 DIAGNOSIS — O26.843 UTERINE SIZE DATE DISCREPANCY, THIRD TRIMESTER: ICD-10-CM

## 2017-05-12 PROCEDURE — 76819 FETAL BIOPHYS PROFIL W/O NST: CPT

## 2017-05-16 ENCOUNTER — HOSPITAL ENCOUNTER (OUTPATIENT)
Dept: ULTRASOUND IMAGING | Facility: CLINIC | Age: 27
Discharge: HOME | End: 2017-05-16
Attending: OBSTETRICS & GYNECOLOGY | Admitting: OBSTETRICS & GYNECOLOGY
Payer: COMMERCIAL

## 2017-05-16 DIAGNOSIS — O26.843 UTERINE SIZE DATE DISCREPANCY, THIRD TRIMESTER: ICD-10-CM

## 2017-05-16 DIAGNOSIS — O99.810 ABNORMAL MATERNAL GLUCOSE TOLERANCE, ANTEPARTUM: ICD-10-CM

## 2017-05-16 DIAGNOSIS — Z34.80 PRENATAL CARE, SUBSEQUENT PREGNANCY, UNSPECIFIED TRIMESTER: ICD-10-CM

## 2017-05-16 PROCEDURE — 76819 FETAL BIOPHYS PROFIL W/O NST: CPT

## 2017-05-16 NOTE — PROGRESS NOTES
Your results are normal.  Please followup as was discussed in the office or call with questions.  Lina Butler MD

## 2017-05-19 ENCOUNTER — HOSPITAL ENCOUNTER (OUTPATIENT)
Dept: ULTRASOUND IMAGING | Facility: CLINIC | Age: 27
Discharge: HOME | End: 2017-05-19
Attending: OBSTETRICS & GYNECOLOGY | Admitting: OBSTETRICS & GYNECOLOGY
Payer: COMMERCIAL

## 2017-05-19 ENCOUNTER — PRENATAL OFFICE VISIT (OUTPATIENT)
Dept: OBGYN | Facility: CLINIC | Age: 27
End: 2017-05-19
Payer: COMMERCIAL

## 2017-05-19 VITALS
BODY MASS INDEX: 39.38 KG/M2 | SYSTOLIC BLOOD PRESSURE: 126 MMHG | HEART RATE: 87 BPM | DIASTOLIC BLOOD PRESSURE: 71 MMHG | HEIGHT: 62 IN | WEIGHT: 214 LBS

## 2017-05-19 DIAGNOSIS — Z34.83 PRENATAL CARE, SUBSEQUENT PREGNANCY, THIRD TRIMESTER: Primary | ICD-10-CM

## 2017-05-19 DIAGNOSIS — Z34.80 PRENATAL CARE, SUBSEQUENT PREGNANCY, UNSPECIFIED TRIMESTER: ICD-10-CM

## 2017-05-19 DIAGNOSIS — O26.843 UTERINE SIZE DATE DISCREPANCY, THIRD TRIMESTER: ICD-10-CM

## 2017-05-19 DIAGNOSIS — O99.810 ABNORMAL MATERNAL GLUCOSE TOLERANCE, ANTEPARTUM: ICD-10-CM

## 2017-05-19 PROCEDURE — 99207 ZZC PRENATAL VISIT: CPT | Performed by: OBSTETRICS & GYNECOLOGY

## 2017-05-19 PROCEDURE — 76819 FETAL BIOPHYS PROFIL W/O NST: CPT

## 2017-05-19 NOTE — PROGRESS NOTES
"CC: Here for routine prenatal visit @ 36w3d   HPI: + FM, no ctx, no LOF, no VB.  Says her sugars are better on glyburide.  Denies drug use.  Does smoke cigarettes, but infrequently    PE: /71 (BP Location: Left arm, Patient Position: Chair, Cuff Size: Adult Large)  Pulse 87  Ht 5' 1.5\" (1.562 m)  Wt 214 lb (97.1 kg)  LMP  (LMP Unknown)  BMI 39.78 kg/m2   See OB flowsheet    BPP , cephalic    A/P  @ 36w3d normal pregnancy, Bartter's syndrome, A2GDM    1. Routine prenatal care  2. A2GDM, S>D: repeat growth scan, continue weekly BPPs.  Patient would like to avoid .  Will reassess size.    RTC 1 week    Angely Petersen M.D.    "

## 2017-05-19 NOTE — MR AVS SNAPSHOT
After Visit Summary   5/19/2017    Mora Reardon    MRN: [id number]           Patient Information     Date Of Birth          1990        Visit Information        Provider Department      5/19/2017 10:30 AM Angely Petersen MD [facility]        Today's Diagnoses     Prenatal care, subsequent pregnancy, third trimester    -  1       Follow-ups after your visit        Your next 10 appointments already scheduled     May 26, 2017 11:30 AM CDT   ESTABLISHED PRENATAL with Star Mancilla MD   [facility]    [address] [address]   [phone]            Jun 02, 2017  9:45 AM CDT   ESTABLISHED PRENATAL with Star Mancilla MD   [facility]    [address] [address]   [phone]              Future tests that were ordered for you today     Open Future Orders        Priority Expected Expires Ordered    US OB Ltd One Or More Fetus FU/Repeat Routine  5/19/2018 5/19/2017            Who to contact     If you have questions or need follow up information about today's clinic visit or your schedule please contact [facility] directly at [phone].  Normal or non-critical lab and imaging results will be communicated to you by [employer], letter or phone within 4 business days after the clinic has received the results. If you do not hear from us within 7 days, please contact the clinic through [employer] or phone. If you have a critical or abnormal lab result, we will notify you by phone as soon as possible.  Submit refill requests through [employer] or call your pharmacy and they will forward the refill request to us. Please allow 3 business days for your refill to be completed.          Additional Information About Your Visit        [employer] Information     [employer] lets you send messages to your doctor, view your test results, renew your  "prescriptions, schedule appointments and more. To sign up, go to [email or web address] . Click on \"Log in\" on the left side of the screen, which will take you to the Welcome page. Then click on \"Sign up Now\" on the right side of the page.     You will be asked to enter the access code listed below, as well as some personal information. Please follow the directions to create your username and password.     Your access code is: [email or web address]  Expires: 2017 10:37 AM     Your access code will  in 90 days. If you need help or a new code, please call your [facility] clinic or [phone].        Care EveryWhere ID     This is your Care EveryWhere ID. This could be used by other organizations to access your [facility] medical records  [id number]        Your Vitals Were     Pulse Height Last Period BMI (Body Mass Index)          87 5' 1.5\" (1.562 m) (LMP Unknown) 39.78 kg/m2         Blood Pressure from Last 3 Encounters:   17 126/71   05/10/17 128/65   17 109/61    Weight from Last 3 Encounters:   17 214 lb (97.1 kg)   05/10/17 212 lb 6.4 oz (96.3 kg)   17 207 lb (93.9 kg)               Primary Care Provider    [facility]       No address on file        Thank you!     Thank you for choosing [facility]  for your care. Our goal is always to provide you with excellent care. Hearing back from our patients is one way we can continue to improve our services. Please take a few minutes to complete the written survey that you may receive in the mail after your visit with us. Thank you!             Your Updated Medication List - Protect others around you: Learn how to safely use, store and throw away your medicines at www.disposemymeds.org.          This list is accurate as of: 17 11:33 AM.  Always use your most recent med list.                   Brand Name Dispense Instructions for use    blood glucose calibration solution    no brand specified    1 each    Use " to calibrate blood glucose monitor as directed.       blood glucose lancets standard    no brand specified    2 Box    Use to test blood sugar 4 times daily or as directed.       blood glucose monitoring lancets     1 Box    Use to test blood sugar 4 times daily       blood glucose monitoring meter device kit    no brand specified    1 kit    Use to test blood sugar 4 times daily or as directed.       * blood glucose monitoring test strip    GLUCOCARD VITAL TEST    150 each    Use to test blood sugar 4 times daily or as directed.       * blood glucose monitoring test strip    no brand specified    150 strip    Use to test blood sugars 4 times daily or as directed       [employer] PRENATAL GUMMY PO          glyBURIDE 1.25 MG tablet    DIABETA /MICRONASE    30 tablet    Take 1 tablet (1.25 mg) by mouth daily (with dinner)       potassium chloride 20 MEQ Packet    KLOR-CON    90 tablet    Take 20 mEq by mouth daily       TYLENOL PO      Take 500 mg by mouth Reported on 5/19/2017       * Notice:  This list has 2 medication(s) that are the same as other medications prescribed for you. Read the directions carefully, and ask your doctor or other care provider to review them with you.

## 2017-05-26 ENCOUNTER — PRENATAL OFFICE VISIT (OUTPATIENT)
Dept: OBGYN | Facility: CLINIC | Age: 27
End: 2017-05-26
Payer: COMMERCIAL

## 2017-05-26 ENCOUNTER — HOSPITAL ENCOUNTER (OUTPATIENT)
Dept: ULTRASOUND IMAGING | Facility: CLINIC | Age: 27
Discharge: HOME | End: 2017-05-26
Attending: OBSTETRICS & GYNECOLOGY | Admitting: OBSTETRICS & GYNECOLOGY
Payer: COMMERCIAL

## 2017-05-26 ENCOUNTER — TELEPHONE (OUTPATIENT)
Dept: OBGYN | Facility: CLINIC | Age: 27
End: 2017-05-26

## 2017-05-26 VITALS
BODY MASS INDEX: 38.64 KG/M2 | SYSTOLIC BLOOD PRESSURE: 117 MMHG | WEIGHT: 210 LBS | DIASTOLIC BLOOD PRESSURE: 72 MMHG | HEART RATE: 93 BPM | HEIGHT: 62 IN

## 2017-05-26 DIAGNOSIS — O26.843 UTERINE SIZE DATE DISCREPANCY, THIRD TRIMESTER: ICD-10-CM

## 2017-05-26 DIAGNOSIS — Z34.80 PRENATAL CARE, SUBSEQUENT PREGNANCY, UNSPECIFIED TRIMESTER: ICD-10-CM

## 2017-05-26 DIAGNOSIS — Z34.83 PRENATAL CARE, SUBSEQUENT PREGNANCY, THIRD TRIMESTER: Primary | ICD-10-CM

## 2017-05-26 DIAGNOSIS — O99.810 ABNORMAL MATERNAL GLUCOSE TOLERANCE, ANTEPARTUM: ICD-10-CM

## 2017-05-26 PROCEDURE — 76819 FETAL BIOPHYS PROFIL W/O NST: CPT

## 2017-05-26 PROCEDURE — 99207 ZZC PRENATAL VISIT: CPT | Performed by: OBSTETRICS & GYNECOLOGY

## 2017-05-26 NOTE — TELEPHONE ENCOUNTER
Forms signed, faxed to [phone], placed in folder in front office    Denise Behrendt Specialty CSS

## 2017-05-26 NOTE — MR AVS SNAPSHOT
After Visit Summary   5/26/2017    Mora Reardon    MRN: [id number]           Patient Information     Date Of Birth          1990        Visit Information        Provider Department      5/26/2017 11:30 AM Star Mancilla MD [facility]        Today's Diagnoses     Prenatal care, subsequent pregnancy, third trimester    -  1    Abnormal maternal glucose tolerance, antepartum           Follow-ups after your visit        Your next 10 appointments already scheduled     Jun 02, 2017  9:45 AM CDT   ESTABLISHED PRENATAL with Star Mancilla MD   [facility]    [address] [address]   [phone]            Jun 02, 2017 10:30 AM CDT   US FETAL BIOPHYS PROFILE W/O NON STRESS TEST with [address] [facility] ([facility])    [address] [address]   [phone]           Please bring a list of your medicines (including vitamins, minerals and over-the-counter drugs). Also, tell your doctor about any allergies you may have. Wear comfortable clothes and leave your valuables at home.  If you re less than 20 weeks drink four 8-ounce glasses of fluid an hour before your exam. If you need to empty your bladder before your exam, try to release only a little urine. Then, drink another glass of fluid.  You may have up to two family members in the exam room. If you bring a small child, an adult must be there to care for him or her.  Please call the Imaging Department at your exam site with any questions.              Who to contact     If you have questions or need follow up information about today's clinic visit or your schedule please contact [facility] directly at [phone].  Normal or non-critical lab and imaging results will be communicated to you by MyChart, letter or phone within 4 business days after the clinic has received the results. If  "you do not hear from us within 7 days, please contact the clinic through [employer] or phone. If you have a critical or abnormal lab result, we will notify you by phone as soon as possible.  Submit refill requests through [employer] or call your pharmacy and they will forward the refill request to us. Please allow 3 business days for your refill to be completed.          Additional Information About Your Visit        [email or web address] Information     [employer] lets you send messages to your doctor, view your test results, renew your prescriptions, schedule appointments and more. To sign up, go to [email or web address] . Click on \"Log in\" on the left side of the screen, which will take you to the Welcome page. Then click on \"Sign up Now\" on the right side of the page.     You will be asked to enter the access code listed below, as well as some personal information. Please follow the directions to create your username and password.     Your access code is: [email or web address]  Expires: 2017 10:37 AM     Your access code will  in 90 days. If you need help or a new code, please call your [facility] clinic or [phone].        Care EveryWhere ID     This is your Care EveryWhere ID. This could be used by other organizations to access your [facility] medical records  [id number]        Your Vitals Were     Pulse Height Last Period Breastfeeding? BMI (Body Mass Index)       93 5' 1.5\" (1.562 m) (LMP Unknown) No 39.04 kg/m2        Blood Pressure from Last 3 Encounters:   17 117/72   17 126/71   05/10/17 128/65    Weight from Last 3 Encounters:   17 210 lb (95.3 kg)   17 214 lb (97.1 kg)   05/10/17 212 lb 6.4 oz (96.3 kg)              Today, you had the following     No orders found for display       Primary Care Provider    [facility]       No address on file        Thank you!     Thank you for choosing [facility]  for your care. Our goal is always to provide you with excellent " care. Hearing back from our patients is one way we can continue to improve our services. Please take a few minutes to complete the written survey that you may receive in the mail after your visit with us. Thank you!             Your Updated Medication List - Protect others around you: Learn how to safely use, store and throw away your medicines at www.disposemymeds.org.          This list is accurate as of: 5/26/17 11:57 AM.  Always use your most recent med list.                   Brand Name Dispense Instructions for use    blood glucose calibration solution    no brand specified    1 each    Use to calibrate blood glucose monitor as directed.       blood glucose lancets standard    no brand specified    2 Box    Use to test blood sugar 4 times daily or as directed.       blood glucose monitoring lancets     1 Box    Use to test blood sugar 4 times daily       blood glucose monitoring meter device kit    no brand specified    1 kit    Use to test blood sugar 4 times daily or as directed.       * blood glucose monitoring test strip    GLUCOCARD VITAL TEST    150 each    Use to test blood sugar 4 times daily or as directed.       * blood glucose monitoring test strip    no brand specified    150 strip    Use to test blood sugars 4 times daily or as directed       CVS PRENATAL GUMMY PO          glyBURIDE 1.25 MG tablet    DIABETA /MICRONASE    30 tablet    Take 1 tablet (1.25 mg) by mouth daily (with dinner)       potassium chloride 20 MEQ Packet    KLOR-CON    90 tablet    Take 20 mEq by mouth daily       TYLENOL PO      Take 500 mg by mouth Reported on 5/19/2017       * Notice:  This list has 2 medication(s) that are the same as other medications prescribed for you. Read the directions carefully, and ask your doctor or other care provider to review them with you.

## 2017-05-26 NOTE — TELEPHONE ENCOUNTER
Reason for Call:  Form, our goal is to have forms completed with 72 hours, however, some forms may require a visit or additional information.    Type of letter, form or note:  Proof of Pregnancy/Medical Opinion    Who is the form from?: [place] Dept of Human Services    Where did the form come from: Patient or family brought in       What clinic location was the form placed at?: [place] [facility]    Where the form was placed: Given to physician for signature    What number is listed as a contact on the form?: Fax completed form to: [phone]       Additional comments: NA    Call taken on 5/26/2017 at 2:36 PM by Denise Behrendt

## 2017-05-26 NOTE — NURSING NOTE
"Initial /72 (BP Location: Right arm, Patient Position: Chair, Cuff Size: Adult Large)  Pulse 93  Ht 5' 1.5\" (1.562 m)  Wt 210 lb (95.3 kg)  LMP  (LMP Unknown)  Breastfeeding? No  BMI 39.04 kg/m2 Estimated body mass index is 39.04 kg/(m^2) as calculated from the following:    Height as of this encounter: 5' 1.5\" (1.562 m).    Weight as of this encounter: 210 lb (95.3 kg). .    Anai Hendrix    "

## 2017-05-27 ENCOUNTER — ANESTHESIA (OUTPATIENT)
Dept: OBGYN | Facility: CLINIC | Age: 27
End: 2017-05-27
Payer: COMMERCIAL

## 2017-05-27 ENCOUNTER — ANESTHESIA EVENT (OUTPATIENT)
Dept: OBGYN | Facility: CLINIC | Age: 27
End: 2017-05-27
Payer: COMMERCIAL

## 2017-05-27 PROBLEM — Z37.9 NORMAL LABOR: Status: ACTIVE | Noted: 2017-05-27

## 2017-05-27 PROCEDURE — 37000011 ZZH ANESTHESIA WARD SERVICE: Performed by: NURSE ANESTHETIST, CERTIFIED REGISTERED

## 2017-05-27 PROCEDURE — 25000125 ZZHC RX 250: Performed by: OBSTETRICS & GYNECOLOGY

## 2017-05-27 PROCEDURE — 40000671 ZZH STATISTIC ANESTHESIA CASE

## 2017-05-27 PROCEDURE — 25000128 H RX IP 250 OP 636: Performed by: NURSE ANESTHETIST, CERTIFIED REGISTERED

## 2017-05-27 PROCEDURE — 25000125 ZZHC RX 250: Performed by: NURSE ANESTHETIST, CERTIFIED REGISTERED

## 2017-05-27 RX ORDER — BUPIVACAINE HYDROCHLORIDE 7.5 MG/ML
INJECTION, SOLUTION INTRASPINAL PRN
Status: DISCONTINUED | OUTPATIENT
Start: 2017-05-27 | End: 2017-05-27

## 2017-05-27 RX ORDER — LIDOCAINE HYDROCHLORIDE 10 MG/ML
INJECTION, SOLUTION INFILTRATION; PERINEURAL PRN
Status: DISCONTINUED | OUTPATIENT
Start: 2017-05-27 | End: 2017-05-27

## 2017-05-27 RX ADMIN — LIDOCAINE HYDROCHLORIDE 5 ML: 10 INJECTION, SOLUTION INFILTRATION; PERINEURAL at 14:10

## 2017-05-27 RX ADMIN — LIDOCAINE HYDROCHLORIDE 5 ML: 10 INJECTION, SOLUTION INFILTRATION; PERINEURAL at 14:15

## 2017-05-27 RX ADMIN — BUPIVACAINE HYDROCHLORIDE IN DEXTROSE 0.8 ML: 7.5 INJECTION, SOLUTION SUBARACHNOID at 14:25

## 2017-05-27 RX ADMIN — FENTANYL CITRATE 25 MCG: 50 INJECTION INTRAMUSCULAR; INTRAVENOUS at 14:25

## 2017-05-27 RX ADMIN — EPINEPHRINE 0.2 MG: 1 INJECTION, SOLUTION INTRAMUSCULAR; SUBCUTANEOUS at 14:25

## 2017-05-27 NOTE — ANESTHESIA PROCEDURE NOTES
Peripheral nerve/Neuraxial procedure note : intrathecal  Pre-Procedure  Performed by  ELIAZAR COBB   Location: OB    Procedure Times:5/27/2017 2:05 PM and 5/27/2017 2:45 PM  Pre-Anesthestic Checklist: patient identified, IV checked, site marked, risks and benefits discussed, informed consent, monitors and equipment checked, pre-op evaluation and at physician/surgeon's request    Timeout  Correct Patient: Yes   Correct Procedure: Yes   Correct Site: Yes   Correct Laterality: N/A   Correct Position: Yes   Site Marked: N/A   .   Procedure Documentation    .    Procedure:    Intrathecal.  Insertion Site:L3-4  (midline approach)      Patient Prep;mask, sterile gloves, povidone-iodine 7.5% surgical scrub, patient draped.  .  Needle: (). # of attempts: 3. # of redirects: 3.  Spinal Needle: Roman tip 22 G. 3.5 in.  . . .     Assessment/Narrative  Paresthesias: No.  .  .  clear CSF fluid removed while sitting   . Time Injected: 14:25  Comments:  VAS pain score prior to injection:9    VAS pain score after injection:1    FHR stable, pt. Tolerated well.

## 2017-05-27 NOTE — ANESTHESIA CARE TRANSFER NOTE
Patient: Mora Reardon    * No procedures listed *    Diagnosis: * No pre-op diagnosis entered *  Diagnosis Additional Information: No value filed.    Anesthesia Type:   No value filed.     Note:    Patient transferred to:Labor and Delivery        Vitals: (Last set prior to Anesthesia Care Transfer)              Electronically Signed By: ADELAIDE Naranjo CRNA  May 27, 2017  2:46 PM

## 2017-05-27 NOTE — ANESTHESIA PREPROCEDURE EVALUATION
Anesthesia Evaluation       history and physical reviewed .             ROS/MED HX    ENT/Pulmonary:       Neurologic:  - neg neurologic ROS     Cardiovascular:  - neg cardiovascular ROS       METS/Exercise Tolerance:     Hematologic:         Musculoskeletal:         GI/Hepatic:         Renal/Genitourinary:         Endo:         Psychiatric:         Infectious Disease:         Malignancy:         Other:                     Physical Exam  Normal systems: cardiovascular, pulmonary and dental    Airway   Mallampati: II  TM distance: > 3 FB  Neck ROM: full  Mouth opening: > 3 cm    Dental     Cardiovascular       Pulmonary           neg OB ROS                 Anesthesia Plan               Postoperative Care      Consents  Anesthetic plan, risks, benefits and alternatives discussed with:  Patient..                          .

## 2017-05-27 NOTE — ANESTHESIA POSTPROCEDURE EVALUATION
Patient: Mora Reardon    * No procedures listed *    Diagnosis:* No pre-op diagnosis entered *  Diagnosis Additional Information: No value filed.    Anesthesia Type:  No value filed.    Note:  Anesthesia Post Evaluation    Patient location during evaluation: Bedside  Patient participation: Able to fully participate in evaluation  Level of consciousness: awake  Pain management: adequate  Cardiovascular status: acceptable  Respiratory status: acceptable  Hydration status: stable             Last vitals:  Vitals:    05/27/17 1300   BP: 136/66   Temp: 37  C (98.6  F)         Electronically Signed By: ADELAIDE Naranjo CRNA  May 27, 2017  2:46 PM

## 2017-09-26 ENCOUNTER — OFFICE VISIT (OUTPATIENT)
Dept: FAMILY MEDICINE | Facility: CLINIC | Age: 27
End: 2017-09-26
Payer: COMMERCIAL

## 2017-09-26 VITALS
DIASTOLIC BLOOD PRESSURE: 64 MMHG | SYSTOLIC BLOOD PRESSURE: 108 MMHG | TEMPERATURE: 98.8 F | HEART RATE: 68 BPM | BODY MASS INDEX: 27.66 KG/M2 | WEIGHT: 148.8 LBS | RESPIRATION RATE: 16 BRPM

## 2017-09-26 DIAGNOSIS — L30.9 DERMATITIS: ICD-10-CM

## 2017-09-26 DIAGNOSIS — Z11.3 SCREEN FOR STD (SEXUALLY TRANSMITTED DISEASE): ICD-10-CM

## 2017-09-26 DIAGNOSIS — A60.00 GENITAL HERPES SIMPLEX, UNSPECIFIED SITE: Primary | ICD-10-CM

## 2017-09-26 LAB
SPECIMEN SOURCE: NORMAL
WET PREP SPEC: NORMAL

## 2017-09-26 PROCEDURE — 99213 OFFICE O/P EST LOW 20 MIN: CPT | Performed by: NURSE PRACTITIONER

## 2017-09-26 PROCEDURE — 87491 CHLMYD TRACH DNA AMP PROBE: CPT | Performed by: NURSE PRACTITIONER

## 2017-09-26 PROCEDURE — 87210 SMEAR WET MOUNT SALINE/INK: CPT | Performed by: NURSE PRACTITIONER

## 2017-09-26 PROCEDURE — 87591 N.GONORRHOEAE DNA AMP PROB: CPT | Performed by: NURSE PRACTITIONER

## 2017-09-26 RX ORDER — ACYCLOVIR 400 MG/1
400 TABLET ORAL 3 TIMES DAILY
Qty: 15 TABLET | Refills: 1 | Status: SHIPPED | OUTPATIENT
Start: 2017-09-26 | End: 2017-12-20

## 2017-09-26 ASSESSMENT — PAIN SCALES - GENERAL: PAINLEVEL: NO PAIN (0)

## 2017-09-26 NOTE — PROGRESS NOTES
SUBJECTIVE:   Mora Reardon is a 27 year old female who presents to clinic today for the following health issues:      1.) Pt would like to get STD check today. States  has been cheating on her so would like to be checked.     States that previous to marriage she acquired genital warts but has not had any outbreak of this since. Acquired 10-11 years ago - hasn't had an outbreak since   New ulcers vaginally  - some pain     Rash  Has had present for 2 months  - non-pruritic, but does itch because they are there  No drainage, if itches open does   No new exposures to anything, no change in soaps, lotions, skin care.   No fevers, feels fine otherwise         Problem list and histories reviewed & adjusted, as indicated.  Additional history: as documented    Patient Active Problem List   Diagnosis     Bipolar disorder (H)     Constipation     Bartter's syndrome     Rh negative status during pregnancy     Patient non-compliance     CTS (carpal tunnel syndrome)     GERD (gastroesophageal reflux disease)     CARDIOVASCULAR SCREENING; LDL GOAL LESS THAN 160     Prenatal care, subsequent pregnancy     Abnormal maternal glucose tolerance, antepartum     Normal labor      (normal spontaneous vaginal delivery)     Past Surgical History:   Procedure Laterality Date     TOE SURGERY      removal of glass       Social History   Substance Use Topics     Smoking status: Current Every Day Smoker     Packs/day: 0.50     Years: 6.00     Types: Cigarettes     Smokeless tobacco: Never Used      Comment: smoking 10cig/day- quit with pregnancy     Alcohol use No      Comment: rare- quit with pregnancy     Family History   Problem Relation Age of Onset     DIABETES Mother      Thyroid Disease Mother      Depression Mother      Hypertension Paternal Grandmother      Cancer - colorectal Paternal Grandmother      colon     CANCER Paternal Grandfather      tongue cancer, not smoker     Genetic Disorder Sister      Rosita  Syndrome         Current Outpatient Prescriptions   Medication Sig Dispense Refill     acyclovir (ZOVIRAX) 400 MG tablet Take 1 tablet (400 mg) by mouth 3 times daily 15 tablet 1     ibuprofen (ADVIL/MOTRIN) 600 MG tablet Take 1 tablet (600 mg) by mouth every 6 hours as needed for other (cramping) 30 tablet 3     Acetaminophen (TYLENOL PO) Take 500 mg by mouth once as needed Reported on 5/19/2017       Allergies   Allergen Reactions     Keflex [Cephalexin Monohydrate] Hives and Rash         Reviewed and updated as needed this visit by clinical staff  Tobacco  Allergies  Meds  Problems       Reviewed and updated as needed this visit by Provider  Allergies  Meds  Problems         ROS:  Constitutional, HEENT, cardiovascular, pulmonary, gi and gu systems are negative, except as otherwise noted.      OBJECTIVE:   /64 (BP Location: Right arm, Patient Position: Chair, Cuff Size: Adult Regular)  Pulse 68  Temp 98.8  F (37.1  C) (Tympanic)  Resp 16  Wt 148 lb 12.8 oz (67.5 kg)  BMI 27.66 kg/m2  Body mass index is 27.66 kg/(m^2).  GENERAL APPEARANCE: healthy, alert and no distress  EYES: Eyes grossly normal to inspection, PERRL and conjunctivae and sclerae normal  HENT: ear canals and TM's normal and nose and mouth without ulcers or lesions  NECK: no adenopathy, no asymmetry, masses, or scars and thyroid normal to palpation  RESP: lungs clear to auscultation - no rales, rhonchi or wheezes  CV: regular rates and rhythm, normal S1 S2, no S3 or S4 and no murmur, click or rub  ABDOMEN: soft, nontender, without hepatosplenomegaly or masses and bowel sounds normal   (female): normal cervix, adnexae, and uterus without masses or discharge and few erythematous vesicular lesions in cluster around perineal area.  No evidence for secondary infection. 2 -  0.3 cm and 0.5 cm lumps on right lower labia consistent with folliculitis   MS: extremities normal- no gross deformities noted  SKIN: generalized non-pruritic  erythematous papules on bilateral upper arms, trunk and back    NEURO: Normal strength and tone, mentation intact and speech normal  PSYCH: mentation appears normal and affect normal/bright    Diagnostic Test Results:  STD testing - pending     ASSESSMENT/PLAN:     1. Genital herpes simplex, unspecified site  Patient with history of genital herpes, has current outbreak - will treat   - acyclovir (ZOVIRAX) 400 MG tablet; Take 1 tablet (400 mg) by mouth 3 times daily  Dispense: 15 tablet; Refill: 1    2. Screen for STD (sexually transmitted disease)  Patient requesting STD testing, will await results   - Wet prep  - Chlamydia trachomatis PCR  - Neisseria gonorrhoeae PCR    3. Dermatitis  Has generalized body rash upper arms, trunk and back present for 2 months, non-pruritic. No new exposures. Will send to dermatology.   - DERMATOLOGY REFERRAL    Patient Instructions   Will get testing sent out and notify you as soon as they are back    Start Acyclovir     Warm/hot compresses to vaginal area or sitz baths     See dermatology for skin     Return to clinic as needed      ADELAIDE Rodriguez CNP  [facility]

## 2017-09-26 NOTE — PATIENT INSTRUCTIONS
Will get testing sent out and notify you as soon as they are back    Start Acyclovir     Warm/hot compresses to vaginal area or sitz baths     See dermatology for skin     Return to clinic as needed

## 2017-09-26 NOTE — NURSING NOTE
"Chief Complaint   Patient presents with     Exposure to STD       Initial /64 (BP Location: Right arm, Patient Position: Chair, Cuff Size: Adult Regular)  Pulse 68  Temp 98.8  F (37.1  C) (Tympanic)  Resp 16  Wt 148 lb 12.8 oz (67.5 kg)  BMI 27.66 kg/m2 Estimated body mass index is 27.66 kg/(m^2) as calculated from the following:    Height as of 5/27/17: 5' 1.5\" (1.562 m).    Weight as of this encounter: 148 lb 12.8 oz (67.5 kg).  Medication Reconciliation: complete    Health Maintenance that is potentially due pending provider review:  PHQ9    Narcisa Terrazas MA  8:50 AM 9/26/2017  .      "

## 2017-09-26 NOTE — MR AVS SNAPSHOT
After Visit Summary   9/26/2017    Mora Reardon    MRN: [id number]           Patient Information     Date Of Birth          1990        Visit Information        Provider Department      9/26/2017 8:40 AM Marta Park APRN CNP [facility]        Today's Diagnoses     Genital herpes simplex, unspecified site    -  1    Dermatitis        Screen for STD (sexually transmitted disease)          Care Instructions    Will get testing sent out and notify you as soon as they are back    Start Acyclovir     Warm/hot compresses to vaginal area or sitz baths     See dermatology for skin     Return to clinic as needed          Follow-ups after your visit        Additional Services     DERMATOLOGY REFERRAL       Your provider has referred you to: FMYUMIKO: [facility] [phone]   [email or web address]    Please be aware that coverage of these services is subject to the terms and limitations of your health insurance plan.  Call member services at your health plan with any benefit or coverage questions.      Please bring the following with you to your appointment:    (1) Any X-Rays, CTs or MRIs which have been performed.  Contact the facility where they were done to arrange for  prior to your scheduled appointment.    (2) List of current medications  (3) This referral request   (4) Any documents/labs given to you for this referral                  Who to contact     If you have questions or need follow up information about today's clinic visit or your schedule please contact [facility] directly at [phone].  Normal or non-critical lab and imaging results will be communicated to you by MyChart, letter or phone within 4 business days after the clinic has received the results. If you do not hear from us within 7 days, please contact the clinic through MyChart or phone. If you have a critical or abnormal lab  "result, we will notify you by phone as soon as possible.  Submit refill requests through ieCrowd or call your pharmacy and they will forward the refill request to us. Please allow 3 business days for your refill to be completed.          Additional Information About Your Visit        Logoprohart Information     ieCrowd lets you send messages to your doctor, view your test results, renew your prescriptions, schedule appointments and more. To sign up, go to [email or web address] . Click on \"Log in\" on the left side of the screen, which will take you to the Welcome page. Then click on \"Sign up Now\" on the right side of the page.     You will be asked to enter the access code listed below, as well as some personal information. Please follow the directions to create your username and password.     Your access code is: [email or web address]  Expires: 2017  9:26 AM     Your access code will  in 90 days. If you need help or a new code, please call your [place] clinic or [phone].        Care EveryWhere ID     This is your Care EveryWhere ID. This could be used by other organizations to access your [place] medical records  [id number]        Your Vitals Were     Pulse Temperature Respirations BMI (Body Mass Index)          68 98.8  F (37.1  C) (Tympanic) 16 27.66 kg/m2         Blood Pressure from Last 3 Encounters:   17 108/64   17 109/58   17 117/72    Weight from Last 3 Encounters:   17 148 lb 12.8 oz (67.5 kg)   17 210 lb (95.3 kg)   17 210 lb (95.3 kg)              We Performed the Following     Chlamydia trachomatis PCR     DERMATOLOGY REFERRAL     Neisseria gonorrhoeae PCR     Wet prep          Today's Medication Changes          These changes are accurate as of: 17  9:27 AM.  If you have any questions, ask your nurse or doctor.               Start taking these medicines.        Dose/Directions    acyclovir 400 MG tablet   Commonly known as:  ZOVIRAX   Used for:  Genital " herpes simplex, unspecified site   Started by:  Marta Park APRN CNP        Dose:  400 mg   Take 1 tablet (400 mg) by mouth 3 times daily   Quantity:  15 tablet   Refills:  1            Where to get your medicines      [place] [facility] [place] [address] [place] [address]     Phone:  [phone]     acyclovir 400 MG tablet                Primary Care Provider    [facility]       No address on file        Equal Access to Services     RODGER LEWIS : Hadii jahaira ku hadasho Soomaali, waaxda luqadaha, qaybta kaalmada adeegyada, waxay idiin hayclarencen serene griggs. So [facility] [phone].    ATENCIÓN: Si habla español, tiene a salgado disposición servicios gratuitos de asistencia lingüística. [facility] [phone].    We comply with applicable federal civil rights laws and [place] laws. We do not discriminate on the basis of race, color, national origin, age, disability sex, sexual orientation or gender identity.            Thank you!     Thank you for choosing [facility]  for your care. Our goal is always to provide you with excellent care. Hearing back from our patients is one way we can continue to improve our services. Please take a few minutes to complete the written survey that you may receive in the mail after your visit with us. Thank you!             Your Updated Medication List - Protect others around you: Learn how to safely use, store and throw away your medicines at www.disposemymeds.org.          This list is accurate as of: 9/26/17  9:27 AM.  Always use your most recent med list.                   Brand Name Dispense Instructions for use Diagnosis    acyclovir 400 MG tablet    ZOVIRAX    15 tablet    Take 1 tablet (400 mg) by mouth 3 times daily    Genital herpes simplex, unspecified site       ibuprofen 600 MG tablet    ADVIL/MOTRIN    30 tablet    Take 1 tablet (600 mg) by  mouth every 6 hours as needed for other (cramping)     (normal spontaneous vaginal delivery)       TYLENOL PO      Take 500 mg by mouth once as needed Reported on 2017

## 2017-09-27 LAB
C TRACH DNA SPEC QL NAA+PROBE: NEGATIVE
N GONORRHOEA DNA SPEC QL NAA+PROBE: NEGATIVE
SPECIMEN SOURCE: NORMAL
SPECIMEN SOURCE: NORMAL

## 2017-10-11 ENCOUNTER — PRENATAL OFFICE VISIT (OUTPATIENT)
Dept: OBGYN | Facility: CLINIC | Age: 27
End: 2017-10-11
Payer: COMMERCIAL

## 2017-10-11 DIAGNOSIS — Z34.80 PRENATAL CARE, SUBSEQUENT PREGNANCY: Primary | ICD-10-CM

## 2017-10-11 PROCEDURE — 99207 ZZC NO CHARGE NURSE ONLY: CPT | Performed by: OBSTETRICS & GYNECOLOGY

## 2017-10-11 NOTE — MR AVS SNAPSHOT
"              After Visit Summary   10/11/2017    Mora Reardon    MRN: [id number]           Patient Information     Date Of Birth          1990        Visit Information        Provider Department      10/11/2017 9:30 AM Star Mancilla MD [facility]        Today's Diagnoses     Prenatal care, subsequent pregnancy    -  1       Follow-ups after your visit        Your next 10 appointments already scheduled     Oct 24, 2017 10:00 AM CDT   New Prenatal with Star Mancilla MD, [facility] 2   [facility]    [address] [address]   [phone]              Who to contact     If you have questions or need follow up information about today's clinic visit or your schedule please contact [facility] directly at [phone].  Normal or non-critical lab and imaging results will be communicated to you by MyChart, letter or phone within 4 business days after the clinic has received the results. If you do not hear from us within 7 days, please contact the clinic through MyChart or phone. If you have a critical or abnormal lab result, we will notify you by phone as soon as possible.  Submit refill requests through Luxola or call your pharmacy and they will forward the refill request to us. Please allow 3 business days for your refill to be completed.          Additional Information About Your Visit        MyChart Information     Luxola lets you send messages to your doctor, view your test results, renew your prescriptions, schedule appointments and more. To sign up, go to [email or web address] . Click on \"Log in\" on the left side of the screen, which will take you to the Welcome page. Then click on \"Sign up Now\" on the right side of the page.     You will be asked to enter the access code listed below, as well as some personal information. Please follow the directions to create your username and " password.     Your access code is: [email or web address]  Expires: 2017  9:26 AM     Your access code will  in 90 days. If you need help or a new code, please call your [place] clinic or [phone].        Care EveryWhere ID     This is your Care EveryWhere ID. This could be used by other organizations to access your [place] medical records  [id number]        Your Vitals Were     Last Period                   (LMP Unknown)            Blood Pressure from Last 3 Encounters:   17 108/64   17 109/58   17 117/72    Weight from Last 3 Encounters:   17 67.5 kg (148 lb 12.8 oz)   17 95.3 kg (210 lb)   17 95.3 kg (210 lb)              Today, you had the following     No orders found for display       Primary Care Provider Office Phone # Fax #    [facility] [phone] [address] [facility] [address]        Equal Access to Services     RODGER LEWIS : Hadii aad ku hadasho Soomaali, waaxda luqadaha, qaybta kaalmada adeegyada, waxnavid cervantes hayraphael vega . So [facility] [phone].    ATENCIÓN: Si habla español, tiene a salgado disposición servicios gratuitos de asistencia lingüística. Llame al [phone].    We comply with applicable federal civil rights laws and [place] laws. We do not discriminate on the basis of race, color, national origin, age, disability, sex, sexual orientation, or gender identity.            Thank you!     Thank you for choosing [facility]  for your care. Our goal is always to provide you with excellent care. Hearing back from our patients is one way we can continue to improve our services. Please take a few minutes to complete the written survey that you may receive in the mail after your visit with us. Thank you!             Your Updated Medication List - Protect others around you: Learn how to safely use, store and throw away your medicines at www.disposemymeds.org.          This list is  accurate as of: 10/11/17  9:39 AM.  Always use your most recent med list.                   Brand Name Dispense Instructions for use Diagnosis    acyclovir 400 MG tablet    ZOVIRAX    15 tablet    Take 1 tablet (400 mg) by mouth 3 times daily    Genital herpes simplex, unspecified site       TYLENOL PO      Take 500 mg by mouth once as needed Reported on 5/19/2017

## 2017-10-25 ENCOUNTER — PRENATAL OFFICE VISIT (OUTPATIENT)
Dept: OBGYN | Facility: CLINIC | Age: 27
End: 2017-10-25
Payer: COMMERCIAL

## 2017-10-25 VITALS
DIASTOLIC BLOOD PRESSURE: 70 MMHG | SYSTOLIC BLOOD PRESSURE: 115 MMHG | BODY MASS INDEX: 27.85 KG/M2 | HEART RATE: 88 BPM | HEIGHT: 63 IN | WEIGHT: 157.2 LBS

## 2017-10-25 DIAGNOSIS — Z32.01 PREGNANCY TEST POSITIVE: Primary | ICD-10-CM

## 2017-10-25 PROBLEM — Z37.9 NORMAL LABOR: Status: RESOLVED | Noted: 2017-05-27 | Resolved: 2017-10-25

## 2017-10-25 PROCEDURE — 76817 TRANSVAGINAL US OBSTETRIC: CPT | Performed by: OBSTETRICS & GYNECOLOGY

## 2017-10-25 NOTE — MR AVS SNAPSHOT
"              After Visit Summary   10/25/2017    Mora Reardon    MRN: [id number]           Patient Information     Date Of Birth          1990        Visit Information        Provider Department      10/25/2017 2:45 PM Smooth Kerns DO; [facility] 1 [facility]        Today's Diagnoses     Pregnancy test positive    -  1       Follow-ups after your visit        Your next 10 appointments already scheduled     Nov 20, 2017 10:00 AM CST   ESTABLISHED PRENATAL with Lina Butler MD   [facility]    [address] [address]   [phone]              Who to contact     If you have questions or need follow up information about today's clinic visit or your schedule please contact [facility] directly at [phone].  Normal or non-critical lab and imaging results will be communicated to you by MyChart, letter or phone within 4 business days after the clinic has received the results. If you do not hear from us within 7 days, please contact the clinic through MyChart or phone. If you have a critical or abnormal lab result, we will notify you by phone as soon as possible.  Submit refill requests through Umami or call your pharmacy and they will forward the refill request to us. Please allow 3 business days for your refill to be completed.          Additional Information About Your Visit        MyChart Information     Umami lets you send messages to your doctor, view your test results, renew your prescriptions, schedule appointments and more. To sign up, go to [email or web address] . Click on \"Log in\" on the left side of the screen, which will take you to the Welcome page. Then click on \"Sign up Now\" on the right side of the page.     You will be asked to enter the access code listed below, as well as some personal information. Please follow the directions to create your username and " "password.     Your access code is: [email or web address]  Expires: 2017  9:26 AM     Your access code will  in 90 days. If you need help or a new code, please call your [place] clinic or [phone].        Care EveryWhere ID     This is your Care EveryWhere ID. This could be used by other organizations to access your [place] medical records  [id number]        Your Vitals Were     Pulse Height Last Period Breastfeeding? BMI (Body Mass Index)       88 5' 2.5\" (1.588 m) (LMP Unknown) No 28.29 kg/m2        Blood Pressure from Last 3 Encounters:   10/25/17 115/70   17 108/64   17 109/58    Weight from Last 3 Encounters:   10/25/17 157 lb 3.2 oz (71.3 kg)   17 148 lb 12.8 oz (67.5 kg)   17 210 lb (95.3 kg)              Today, you had the following     No orders found for display         Today's Medication Changes          These changes are accurate as of: 10/25/17 11:59 PM.  If you have any questions, ask your nurse or doctor.               Stop taking these medicines if you haven't already. Please contact your care team if you have questions.     TYLENOL PO   Stopped by:  Smooth Kerns DO                    Primary Care Provider Office Phone # Fax #    [facility] [place] [phone] [address] [facility] [address] [facility] [address]        Equal Access to Services     RODGER LEWIS AH: Hadii jahaira ku hadasho Soomaali, waaxda luqadaha, qaybta kaalmada denia, waxay billy griggs. So [facility] [phone].    ATENCIÓN: Si habla español, tiene a salgado disposición servicios gratuitos de asistencia lingüística. Llame al [phone].    We comply with applicable federal civil rights laws and [place] laws. We do not discriminate on the basis of race, color, national origin, age, disability, sex, sexual orientation, or gender identity.            Thank you!     Thank you for choosing [facility]  for your care. Our goal is always to provide you " with excellent care. Hearing back from our patients is one way we can continue to improve our services. Please take a few minutes to complete the written survey that you may receive in the mail after your visit with us. Thank you!             Your Updated Medication List - Protect others around you: Learn how to safely use, store and throw away your medicines at www.disposemymeds.org.          This list is accurate as of: 10/25/17 11:59 PM.  Always use your most recent med list.                   Brand Name Dispense Instructions for use Diagnosis    acyclovir 400 MG tablet    ZOVIRAX    15 tablet    Take 1 tablet (400 mg) by mouth 3 times daily    Genital herpes simplex, unspecified site

## 2017-10-25 NOTE — PROGRESS NOTES
Patient presents for viability ultrasound. She is approximately 4 months postpartum. She started to feel a bit nauseated and gave me. She took a home pregnancy test and it was positive. She denies bleeding or cramping.    Transvaginal ultrasound reveals a viable intrauterine pregnancy, with crown rump length measuring 7 weeks 2 days, with cardiac activity in the normal range.    Patient will be scheduled for a official first OB visit upcoming. All questions answered.

## 2017-10-25 NOTE — NURSING NOTE
"Initial /70  Pulse 88  Ht 5' 2.5\" (1.588 m)  Wt 157 lb 3.2 oz (71.3 kg)  LMP  (LMP Unknown)  Breastfeeding? No  BMI 28.29 kg/m2 Estimated body mass index is 28.29 kg/(m^2) as calculated from the following:    Height as of this encounter: 5' 2.5\" (1.588 m).    Weight as of this encounter: 157 lb 3.2 oz (71.3 kg). .      "

## 2017-11-20 ENCOUNTER — PRENATAL OFFICE VISIT (OUTPATIENT)
Dept: OBGYN | Facility: CLINIC | Age: 27
End: 2017-11-20
Payer: COMMERCIAL

## 2017-11-20 VITALS
SYSTOLIC BLOOD PRESSURE: 124 MMHG | DIASTOLIC BLOOD PRESSURE: 60 MMHG | WEIGHT: 157.2 LBS | BODY MASS INDEX: 27.85 KG/M2 | HEIGHT: 63 IN | HEART RATE: 103 BPM

## 2017-11-20 DIAGNOSIS — O99.331 SMOKING (TOBACCO) COMPLICATING PREGNANCY, FIRST TRIMESTER: ICD-10-CM

## 2017-11-20 DIAGNOSIS — Z23 NEED FOR PROPHYLACTIC VACCINATION AND INOCULATION AGAINST INFLUENZA: ICD-10-CM

## 2017-11-20 DIAGNOSIS — Z34.81 PRENATAL CARE, SUBSEQUENT PREGNANCY IN FIRST TRIMESTER: Primary | ICD-10-CM

## 2017-11-20 PROCEDURE — 90686 IIV4 VACC NO PRSV 0.5 ML IM: CPT | Performed by: OBSTETRICS & GYNECOLOGY

## 2017-11-20 PROCEDURE — 90471 IMMUNIZATION ADMIN: CPT | Performed by: OBSTETRICS & GYNECOLOGY

## 2017-11-20 PROCEDURE — 99207 ZZC PRENATAL VISIT: CPT | Performed by: OBSTETRICS & GYNECOLOGY

## 2017-11-20 PROCEDURE — 76815 OB US LIMITED FETUS(S): CPT | Performed by: OBSTETRICS & GYNECOLOGY

## 2017-11-20 NOTE — PROGRESS NOTES
"CC: prenatal  Short interval pregnancy  Smoking  S:  She is almost done with stopping smoking.  She denies other drug use.  No complaints.    /60 (BP Location: Right arm, Patient Position: Chair, Cuff Size: Adult Large)  Pulse 103  Ht 5' 2.5\" (1.588 m)  Wt 157 lb 3.2 oz (71.3 kg)  LMP  (LMP Unknown)  BMI 28.29 kg/m2  Prior labs normal  History of GDM  Limited transabdominal OB US done-viable IUP, fetal movement present  A/P hx GDM-needs 1 hour GTT early, can't do today she needs to leave, but plan for next visit  New ob labs-prior from prior pregnancy-will repeat with next labs  Rh negative- rhogam at 28 weeks and prn  F/u 4 weeks  Declines the quad screen  Smoking-recommended cessation due to risks, she declines assistance.  Was able to quick with her prior pregnancy  Lina Butler MD    "

## 2017-11-20 NOTE — PROGRESS NOTES

## 2017-11-20 NOTE — MR AVS SNAPSHOT
After Visit Summary   11/20/2017    Mora Reardon    MRN: [id number]           Patient Information     Date Of Birth          1990        Visit Information        Provider Department      11/20/2017 10:00 AM Lina Butler MD [facility]        Today's Diagnoses     Prenatal care, subsequent pregnancy in first trimester    -  1    Need for prophylactic vaccination and inoculation against influenza        Smoking (tobacco) complicating pregnancy, first trimester           Follow-ups after your visit        Your next 10 appointments already scheduled     Dec 20, 2017 10:15 AM CST   LAB with [facility] ([facility])    [address] [address]   [phone]           Please do not eat 10-12 hours before your appointment if you are coming in fasting for labs on lipids, cholesterol, or glucose (sugar). This does not apply to pregnant women. Water, hot tea and black coffee (with nothing added) are okay. Do not drink other fluids, diet soda or chew gum.            Dec 20, 2017 10:30 AM CST   ESTABLISHED PRENATAL with Lina Butler MD   [facility] ([facility]    [address] [address]   [phone]              Future tests that were ordered for you today     Open Future Orders        Priority Expected Expires Ordered    Glucose tolerance gest screen 1 hour Routine  2/20/2018 11/20/2017    CBC with platelets Routine  2/20/2018 11/20/2017    Hepatitis B surface antigen Routine  2/20/2018 11/20/2017    HIV Antigen Antibody Combo Routine  2/20/2018 11/20/2017    Anti Treponema Routine  2/20/2018 11/20/2017    Rubella Antibody IgG Quantitative Routine  2/20/2018 11/20/2017    ABO/Rh type and screen Routine  2/20/2018 11/20/2017            Who to contact     If you have questions or need follow up information about today's clinic visit or your  "schedule please contact [facility] directly at [phone].  Normal or non-critical lab and imaging results will be communicated to you by MyChart, letter or phone within 4 business days after the clinic has received the results. If you do not hear from us within 7 days, please contact the clinic through MyChart or phone. If you have a critical or abnormal lab result, we will notify you by phone as soon as possible.  Submit refill requests through en-Gauge or call your pharmacy and they will forward the refill request to us. Please allow 3 business days for your refill to be completed.          Additional Information About Your Visit        People's Software Company[employer]Tweetworks Information     en-Gauge lets you send messages to your doctor, view your test results, renew your prescriptions, schedule appointments and more. To sign up, go to [email or web address] . Click on \"Log in\" on the left side of the screen, which will take you to the Welcome page. Then click on \"Sign up Now\" on the right side of the page.     You will be asked to enter the access code listed below, as well as some personal information. Please follow the directions to create your username and password.     Your access code is: [email or web address]  Expires: 2017  8:26 AM     Your access code will  in 90 days. If you need help or a new code, please call your [place] clinic or [phone].        Care EveryWhere ID     This is your Care EveryWhere ID. This could be used by other organizations to access your [place] medical records  [id number]        Your Vitals Were     Pulse Height Last Period BMI (Body Mass Index)          103 5' 2.5\" (1.588 m) (LMP Unknown) 28.29 kg/m2         Blood Pressure from Last 3 Encounters:   17 124/60   10/25/17 115/70   17 108/64    Weight from Last 3 Encounters:   17 157 lb 3.2 oz (71.3 kg)   10/25/17 157 lb 3.2 oz (71.3 kg)   17 148 lb 12.8 oz (67.5 kg)              We Performed the Following     " FLU VAC, SPLIT VIRUS IM > 3 YO (QUADRIVALENT) [61637]     US OB Limited One Or More Fetuses Port     Vaccine Administration, Initial [33897]        Primary Care Provider Office Phone # Fax #    [facility] [phone] [address] [address]        Equal Access to Services     RODGER LEWIS : Hadii aad ku hadasho Soomaali, waaxda luqadaha, qaybta kaalmada adeegyada, waxay stevein hayaan adeeg kharash lanasra ah. So [facility] [phone].    ATENCIÓN: Si habla español, tiene a salgado disposición servicios gratuitos de asistencia lingüística. Lien al [phone].    We comply with applicable federal civil rights laws and [place] laws. We do not discriminate on the basis of race, color, national origin, age, disability, sex, sexual orientation, or gender identity.            Thank you!     Thank you for choosing [facility]  for your care. Our goal is always to provide you with excellent care. Hearing back from our patients is one way we can continue to improve our services. Please take a few minutes to complete the written survey that you may receive in the mail after your visit with us. Thank you!             Your Updated Medication List - Protect others around you: Learn how to safely use, store and throw away your medicines at www.disposemymeds.org.          This list is accurate as of: 11/20/17  1:24 PM.  Always use your most recent med list.                   Brand Name Dispense Instructions for use Diagnosis    acyclovir 400 MG tablet    ZOVIRAX    15 tablet    Take 1 tablet (400 mg) by mouth 3 times daily    Genital herpes simplex, unspecified site

## 2017-11-20 NOTE — NURSING NOTE
"Chief Complaint   Patient presents with     Prenatal Care     Smoking       Initial /60 (BP Location: Right arm, Patient Position: Chair, Cuff Size: Adult Large)  Pulse 103  Ht 5' 2.5\" (1.588 m)  Wt 157 lb 3.2 oz (71.3 kg)  LMP  (LMP Unknown)  BMI 28.29 kg/m2 Estimated body mass index is 28.29 kg/(m^2) as calculated from the following:    Height as of this encounter: 5' 2.5\" (1.588 m).    Weight as of this encounter: 157 lb 3.2 oz (71.3 kg).  Medication Reconciliation: complete     Karoline Jasso LPN      "

## 2017-12-20 ENCOUNTER — PRENATAL OFFICE VISIT (OUTPATIENT)
Dept: OBGYN | Facility: CLINIC | Age: 27
End: 2017-12-20
Payer: COMMERCIAL

## 2017-12-20 VITALS
DIASTOLIC BLOOD PRESSURE: 57 MMHG | BODY MASS INDEX: 28.99 KG/M2 | WEIGHT: 163.6 LBS | HEIGHT: 63 IN | SYSTOLIC BLOOD PRESSURE: 115 MMHG | HEART RATE: 90 BPM

## 2017-12-20 DIAGNOSIS — Z34.81 PRENATAL CARE, SUBSEQUENT PREGNANCY IN FIRST TRIMESTER: ICD-10-CM

## 2017-12-20 DIAGNOSIS — Z34.82 PRENATAL CARE, SUBSEQUENT PREGNANCY IN SECOND TRIMESTER: Primary | ICD-10-CM

## 2017-12-20 LAB
ABO + RH BLD: NORMAL
ABO + RH BLD: NORMAL
BLD GP AB SCN SERPL QL: NORMAL
BLOOD BANK CMNT PATIENT-IMP: NORMAL
ERYTHROCYTE [DISTWIDTH] IN BLOOD BY AUTOMATED COUNT: 14.1 % (ref 10–15)
GLUCOSE 1H P 50 G GLC PO SERPL-MCNC: 95 MG/DL (ref 60–129)
HCT VFR BLD AUTO: 38.3 % (ref 35–47)
HGB BLD-MCNC: 13.2 G/DL (ref 11.7–15.7)
MCH RBC QN AUTO: 30.8 PG (ref 26.5–33)
MCHC RBC AUTO-ENTMCNC: 34.5 G/DL (ref 31.5–36.5)
MCV RBC AUTO: 90 FL (ref 78–100)
PLATELET # BLD AUTO: 216 10E9/L (ref 150–450)
RBC # BLD AUTO: 4.28 10E12/L (ref 3.8–5.2)
SPECIMEN EXP DATE BLD: NORMAL
WBC # BLD AUTO: 10.5 10E9/L (ref 4–11)

## 2017-12-20 PROCEDURE — 87340 HEPATITIS B SURFACE AG IA: CPT | Performed by: OBSTETRICS & GYNECOLOGY

## 2017-12-20 PROCEDURE — 87389 HIV-1 AG W/HIV-1&-2 AB AG IA: CPT | Performed by: OBSTETRICS & GYNECOLOGY

## 2017-12-20 PROCEDURE — 86850 RBC ANTIBODY SCREEN: CPT | Performed by: OBSTETRICS & GYNECOLOGY

## 2017-12-20 PROCEDURE — 86900 BLOOD TYPING SEROLOGIC ABO: CPT | Performed by: OBSTETRICS & GYNECOLOGY

## 2017-12-20 PROCEDURE — 82950 GLUCOSE TEST: CPT | Performed by: OBSTETRICS & GYNECOLOGY

## 2017-12-20 PROCEDURE — 86901 BLOOD TYPING SEROLOGIC RH(D): CPT | Performed by: OBSTETRICS & GYNECOLOGY

## 2017-12-20 PROCEDURE — 85027 COMPLETE CBC AUTOMATED: CPT | Performed by: OBSTETRICS & GYNECOLOGY

## 2017-12-20 PROCEDURE — 86780 TREPONEMA PALLIDUM: CPT | Performed by: OBSTETRICS & GYNECOLOGY

## 2017-12-20 PROCEDURE — 36415 COLL VENOUS BLD VENIPUNCTURE: CPT | Performed by: OBSTETRICS & GYNECOLOGY

## 2017-12-20 PROCEDURE — 86762 RUBELLA ANTIBODY: CPT | Performed by: OBSTETRICS & GYNECOLOGY

## 2017-12-20 PROCEDURE — 99207 ZZC PRENATAL VISIT: CPT | Performed by: OBSTETRICS & GYNECOLOGY

## 2017-12-20 NOTE — NURSING NOTE
"Chief Complaint   Patient presents with     Prenatal Care       Initial /57 (BP Location: Left arm, Patient Position: Chair, Cuff Size: Adult Regular)  Pulse 90  Ht 5' 2.5\" (1.588 m)  Wt 163 lb 9.6 oz (74.2 kg)  LMP  (LMP Unknown)  BMI 29.45 kg/m2 Estimated body mass index is 29.45 kg/(m^2) as calculated from the following:    Height as of this encounter: 5' 2.5\" (1.588 m).    Weight as of this encounter: 163 lb 9.6 oz (74.2 kg).  Medication Reconciliation: complete     Karoline Jasso LPN      "

## 2017-12-20 NOTE — PROGRESS NOTES
"CC: prenatal  History of GDM A2  S: no complaints.  Almost quit smoking.  Just started a new job.  /57 (BP Location: Left arm, Patient Position: Chair, Cuff Size: Adult Regular)  Pulse 90  Ht 5' 2.5\" (1.588 m)  Wt 163 lb 9.6 oz (74.2 kg)  LMP  (LMP Unknown)  BMI 29.45 kg/m2  A/P planning for labs and glucola today  Declined the quad screen-counseled  Routine precautions  RTC in 4 weeks  Lina Butler MD    "

## 2017-12-20 NOTE — MR AVS SNAPSHOT
"              After Visit Summary   12/20/2017    Mora Reardon    MRN: [id number]           Patient Information     Date Of Birth          1990        Visit Information        Provider Department      12/20/2017 10:30 AM Lina Butler MD [facility]        Today's Diagnoses     Prenatal care, subsequent pregnancy in second trimester    -  1       Follow-ups after your visit        Future tests that were ordered for you today     Open Future Orders        Priority Expected Expires Ordered    US OB > 14 Weeks Complete Single Routine 12/20/2017 12/20/2018 12/20/2017            Who to contact     If you have questions or need follow up information about today's clinic visit or your schedule please contact [facility] directly at [phone].  Normal or non-critical lab and imaging results will be communicated to you by MyChart, letter or phone within 4 business days after the clinic has received the results. If you do not hear from us within 7 days, please contact the clinic through MyChart or phone. If you have a critical or abnormal lab result, we will notify you by phone as soon as possible.  Submit refill requests through [employer] or call your pharmacy and they will forward the refill request to us. Please allow 3 business days for your refill to be completed.          Additional Information About Your Visit        MyChart Information     WiSp[employer] lets you send messages to your doctor, view your test results, renew your prescriptions, schedule appointments and more. To sign up, go to [email or web address] . Click on \"Log in\" on the left side of the screen, which will take you to the Welcome page. Then click on \"Sign up Now\" on the right side of the page.     You will be asked to enter the access code listed below, as well as some personal information. Please follow the directions to create your username and password.     Your access code is: " [email or web address]  Expires: 2017  8:26 AM     Your access code will  in 90 days. If you need help or a new code, please call your [place] clinic or [phone].        Care EveryWhere ID     This is your Care EveryWhere ID. This could be used by other organizations to access your [place] medical records  [id number]        Your Vitals Were     Pulse Height Last Period BMI (Body Mass Index)          90 5' 2.5\" (1.588 m) (LMP Unknown) 29.45 kg/m2         Blood Pressure from Last 3 Encounters:   17 115/57   17 124/60   10/25/17 115/70    Weight from Last 3 Encounters:   17 163 lb 9.6 oz (74.2 kg)   17 157 lb 3.2 oz (71.3 kg)   10/25/17 157 lb 3.2 oz (71.3 kg)                 Today's Medication Changes          These changes are accurate as of: 17 10:43 AM.  If you have any questions, ask your nurse or doctor.               Stop taking these medicines if you haven't already. Please contact your care team if you have questions.     acyclovir 400 MG tablet   Commonly known as:  ZOVIRAX   Stopped by:  Lina Butler MD                    Primary Care Provider Office Phone # Fax #    [facility] [phone] [address] [facility] [address]        Equal Access to Services     RODGER LEWIS AH: Kristi kothraio Sorima, waaxda luqadaha, qaybta kaalmada tawny loza. So [facility] [phone].    ATENCIÓN: Si antoninala john, tiene a salgado disposición servicios gratuitos de asistencia lingüística. Llame al [phone].    We comply with applicable federal civil rights laws and [place] laws. We do not discriminate on the basis of race, color, national origin, age, disability, sex, sexual orientation, or gender identity.            Thank you!     Thank you for choosing [facility]  for your care. Our goal is always to provide you with excellent care. Hearing back from our patients is " one way we can continue to improve our services. Please take a few minutes to complete the written survey that you may receive in the mail after your visit with us. Thank you!             Your Updated Medication List - Protect others around you: Learn how to safely use, store and throw away your medicines at www.disposemymeds.org.          This list is accurate as of: 12/20/17 10:43 AM.  Always use your most recent med list.                   Brand Name Dispense Instructions for use Diagnosis    TYLENOL PO

## 2017-12-21 LAB
HBV SURFACE AG SERPL QL IA: NONREACTIVE
HIV 1+2 AB+HIV1 P24 AG SERPL QL IA: NONREACTIVE
RUBV IGG SERPL IA-ACNC: 22 IU/ML
T PALLIDUM IGG+IGM SER QL: NEGATIVE

## 2018-01-13 ENCOUNTER — OFFICE VISIT (OUTPATIENT)
Dept: URGENT CARE | Facility: URGENT CARE | Age: 28
End: 2018-01-13
Payer: COMMERCIAL

## 2018-01-13 VITALS — HEART RATE: 90 BPM | DIASTOLIC BLOOD PRESSURE: 63 MMHG | SYSTOLIC BLOOD PRESSURE: 109 MMHG | TEMPERATURE: 97.4 F

## 2018-01-13 DIAGNOSIS — Z11.3 SCREEN FOR STD (SEXUALLY TRANSMITTED DISEASE): Primary | ICD-10-CM

## 2018-01-13 DIAGNOSIS — N30.00 ACUTE CYSTITIS WITHOUT HEMATURIA: ICD-10-CM

## 2018-01-13 LAB
ALBUMIN UR-MCNC: ABNORMAL MG/DL
APPEARANCE UR: ABNORMAL
BACTERIA #/AREA URNS HPF: ABNORMAL /HPF
BILIRUB UR QL STRIP: NEGATIVE
COLOR UR AUTO: YELLOW
GLUCOSE UR STRIP-MCNC: NEGATIVE MG/DL
HGB UR QL STRIP: NEGATIVE
KETONES UR STRIP-MCNC: NEGATIVE MG/DL
LEUKOCYTE ESTERASE UR QL STRIP: ABNORMAL
NITRATE UR QL: NEGATIVE
NON-SQ EPI CELLS #/AREA URNS LPF: ABNORMAL /LPF
PH UR STRIP: 7.5 PH (ref 5–7)
RBC #/AREA URNS AUTO: ABNORMAL /HPF
SOURCE: ABNORMAL
SP GR UR STRIP: 1.02 (ref 1–1.03)
SPECIMEN SOURCE: NORMAL
UROBILINOGEN UR STRIP-ACNC: 0.2 EU/DL (ref 0.2–1)
WBC #/AREA URNS AUTO: ABNORMAL /HPF
WET PREP SPEC: NORMAL

## 2018-01-13 PROCEDURE — 87491 CHLMYD TRACH DNA AMP PROBE: CPT | Performed by: NURSE PRACTITIONER

## 2018-01-13 PROCEDURE — 81001 URINALYSIS AUTO W/SCOPE: CPT | Performed by: NURSE PRACTITIONER

## 2018-01-13 PROCEDURE — 99213 OFFICE O/P EST LOW 20 MIN: CPT | Performed by: NURSE PRACTITIONER

## 2018-01-13 PROCEDURE — 87210 SMEAR WET MOUNT SALINE/INK: CPT | Performed by: NURSE PRACTITIONER

## 2018-01-13 PROCEDURE — 87591 N.GONORRHOEAE DNA AMP PROB: CPT | Performed by: NURSE PRACTITIONER

## 2018-01-13 PROCEDURE — 36415 COLL VENOUS BLD VENIPUNCTURE: CPT | Performed by: NURSE PRACTITIONER

## 2018-01-13 PROCEDURE — 87086 URINE CULTURE/COLONY COUNT: CPT | Performed by: NURSE PRACTITIONER

## 2018-01-13 PROCEDURE — 86803 HEPATITIS C AB TEST: CPT | Performed by: NURSE PRACTITIONER

## 2018-01-13 PROCEDURE — 87340 HEPATITIS B SURFACE AG IA: CPT | Performed by: NURSE PRACTITIONER

## 2018-01-13 RX ORDER — AMOXICILLIN 500 MG/1
500 CAPSULE ORAL 2 TIMES DAILY
Qty: 14 CAPSULE | Refills: 0 | Status: SHIPPED | OUTPATIENT
Start: 2018-01-13 | End: 2018-01-14

## 2018-01-13 NOTE — PATIENT INSTRUCTIONS
"   * BLADDER INFECTION,Female (Adult)    A bladder infection (\"cystitis\" or \"UTI\") usually causes a constant urge to urinate and a burning when passing urine. Urine may be cloudy, smelly or dark. There may be pain in the lower abdomen. A bladder infection occurs when bacteria from the vaginal area enter the bladder opening (urethra). This can occur from sexual intercourse, wearing tight clothing, dehydration and other factors.  HOME CARE:  1. Drink lots of fluids (at least 6-8 glasses a day, unless you must restrict fluids for other medical reasons). This will force the medicine into your urinary system and flush the bacteria out of your body. Cranberry juice has been shown to help clear out the bacteria.  2. Avoid sexual intercourse until your symptoms are gone.  3. A bladder infection is treated with antibiotics. You may also be given Pyridium (generic = phenazopyridine) to reduce the burning sensation. This medicine will cause your urine to become a bright orange color. The orange urine may stain clothing. You may wear a pad or panty-liner to protect clothing.  PREVENTING FUTURE INFECTIONS:  1. Always wipe from front to back after a bowel movement.  2. Keep the genital area clean and dry.  3. Drink plenty of fluids each day to avoid dehydration.  4. Urinate right after intercourse to flush out the bladder.  5. Wear cotton underwear and cotton-lined panty hose; avoid tight-fitting pants.  6. If you are on birth control pills and are having frequent bladder infections, discuss with your doctor.  FOLLOW UP: Return to this facility or see your doctor if ALL symptoms are not gone after three days of treatment.  GET PROMPT MEDICAL ATTENTION if any of the following occur:    Fever over 101 F (38.3 C)    No improvement by the third day of treatment    Increasing back or abdominal pain    Repeated vomiting; unable to keep medicine down    Weakness, dizziness or fainting    Vaginal discharge    Pain, redness or swelling in " the labia (outer vaginal area)    5913-4699 [employer]. [address] [place] [address], [place] [address]. All rights reserved. This information is not intended as a substitute for professional medical care. Always follow your healthcare professional's instructions.  This information has been modified by your health care provider with permission from the publisher.
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
## 2018-01-13 NOTE — MR AVS SNAPSHOT
"              After Visit Summary   1/13/2018    Mora Reardon    MRN: [id number]           Patient Information     Date Of Birth          1990        Visit Information        Provider Department      1/13/2018 3:15 PM Maria Eugenia Murcia APRN [facility]        Today's Diagnoses     Screen for STD (sexually transmitted disease)    -  1    Acute cystitis without hematuria          Care Instructions       * BLADDER INFECTION,Female (Adult)    A bladder infection (\"cystitis\" or \"UTI\") usually causes a constant urge to urinate and a burning when passing urine. Urine may be cloudy, smelly or dark. There may be pain in the lower abdomen. A bladder infection occurs when bacteria from the vaginal area enter the bladder opening (urethra). This can occur from sexual intercourse, wearing tight clothing, dehydration and other factors.  HOME CARE:  1. Drink lots of fluids (at least 6-8 glasses a day, unless you must restrict fluids for other medical reasons). This will force the medicine into your urinary system and flush the bacteria out of your body. Cranberry juice has been shown to help clear out the bacteria.  2. Avoid sexual intercourse until your symptoms are gone.  3. A bladder infection is treated with antibiotics. You may also be given Pyridium (generic = phenazopyridine) to reduce the burning sensation. This medicine will cause your urine to become a bright orange color. The orange urine may stain clothing. You may wear a pad or panty-liner to protect clothing.  PREVENTING FUTURE INFECTIONS:  1. Always wipe from front to back after a bowel movement.  2. Keep the genital area clean and dry.  3. Drink plenty of fluids each day to avoid dehydration.  4. Urinate right after intercourse to flush out the bladder.  5. Wear cotton underwear and cotton-lined panty hose; avoid tight-fitting pants.  6. If you are on birth control pills and are having frequent bladder infections, " discuss with your doctor.  FOLLOW UP: Return to this facility or see your doctor if ALL symptoms are not gone after three days of treatment.  GET PROMPT MEDICAL ATTENTION if any of the following occur:    Fever over 101 F (38.3 C)    No improvement by the third day of treatment    Increasing back or abdominal pain    Repeated vomiting; unable to keep medicine down    Weakness, dizziness or fainting    Vaginal discharge    Pain, redness or swelling in the labia (outer vaginal area)    2599-9896 [employer]. [address] [place] [address]. All rights reserved. This information is not intended as a substitute for professional medical care. Always follow your healthcare professional's instructions.  This information has been modified by your health care provider with permission from the publisher.            Follow-ups after your visit        Your next 10 appointments already scheduled     Jan 18, 2018  9:15 AM CST   [facility]ABL[facility]    [address] [facility] [address]   [phone]            Jan 18, 2018 10:15 AM CST   (Arrive by 10:00 AM)   US OB > 14 WEEKS COMPLETE SINGLE with [id number] [place] [address] Ultrasound ([facility])    [address] [facility] [address]   [phone]           Please bring a list of your medicines (including vitamins, minerals and over-the-counter drugs). Also, tell your doctor about any allergies you may have. Wear comfortable clothes and leave your valuables at home.  If you re less than 20 weeks drink four 8-ounce glasses of fluid an hour before your exam. If you need to empty your bladder before your exam, try to release only a little urine. Then, drink another glass of fluid.  You may have up to two family members in the exam room. If you bring a small child, an adult must be there to care for him or her.  Please call the Imaging  "Department at your exam site with any questions.            Feb 15, 2018  9:00 AM CST   ESTABLISHED PRENATAL with Katey Mckinney MD   [facility]    [address] [facility] [address] [phone]              Who to contact     If you have questions or need follow up information about today's clinic visit or your schedule please contact [facility] directly at [phone].  Normal or non-critical lab and imaging results will be communicated to you by MyChart, letter or phone within 4 business days after the clinic has received the results. If you do not hear from us within 7 days, please contact the clinic through [employer] or phone. If you have a critical or abnormal lab result, we will notify you by phone as soon as possible.  Submit refill requests through [employer] or call your pharmacy and they will forward the refill request to us. Please allow 3 business days for your refill to be completed.          Additional Information About Your Visit        [employer] Information     [employer] lets you send messages to your doctor, view your test results, renew your prescriptions, schedule appointments and more. To sign up, go to [email or web address] . Click on \"Log in\" on the left side of the screen, which will take you to the Welcome page. Then click on \"Sign up Now\" on the right side of the page.     You will be asked to enter the access code listed below, as well as some personal information. Please follow the directions to create your username and password.     Your access code is: [email or web address]  Expires: 2018  4:08 PM     Your access code will  in 90 days. If you need help or a new code, please call your [facility] or [phone].        Care EveryWhere ID     This is your Care EveryWhere ID. This could be used by other organizations to access your [place] medical records  [id number]        Your Vitals " Were     Pulse Temperature Last Period             90 97.4  F (36.3  C) (Oral) (LMP Unknown)          Blood Pressure from Last 3 Encounters:   01/13/18 109/63   12/20/17 115/57   11/20/17 124/60    Weight from Last 3 Encounters:   12/20/17 163 lb 9.6 oz (74.2 kg)   11/20/17 157 lb 3.2 oz (71.3 kg)   10/25/17 157 lb 3.2 oz (71.3 kg)              We Performed the Following     **Hepatitis C Screen Reflex to RNA FUTURE anytime     Chlamydia trachomatis PCR     Hepatitis B surface antigen     Neisseria gonorrhoeae PCR     UA reflex to Microscopic and Culture     Urine Culture Aerobic Bacterial     Urine Microscopic     Wet prep          Today's Medication Changes          These changes are accurate as of: 1/13/18  4:08 PM.  If you have any questions, ask your nurse or doctor.               Start taking these medicines.        Dose/Directions    amoxicillin 500 MG capsule   Commonly known as:  AMOXIL   Used for:  Acute cystitis without hematuria   Started by:  Maria Eugenia Murcia APRN CNP        Dose:  500 mg   Take 1 capsule (500 mg) by mouth 2 times daily for 7 days   Quantity:  14 capsule   Refills:  0            Where to get your medicines      These medications were sent to [id number] [address]  [address] [place] [address]    Hours:  Closed 10-16-08 business to [facility] Phone:  [phone]     amoxicillin 500 MG capsule                Primary Care Provider Office Phone # Fax #    [facility] [phone] [address] [facility] [address]        Equal Access to Services     RODGER LEWIS AH: Hadmaria t levi Sorima, waaxda luqadaha, qaybta kaalmada tawny loza. So [facility] [phone].    ATENCIÓN: Si habla español, tiene a salgado disposición servicios gratuitos de asistencia lingüística. Llame al [phone].    We comply with applicable federal civil rights laws and [place] laws.  We do not discriminate on the basis of race, color, national origin, age, disability, sex, sexual orientation, or gender identity.            Thank you!     Thank you for choosing [facility]  for your care. Our goal is always to provide you with excellent care. Hearing back from our patients is one way we can continue to improve our services. Please take a few minutes to complete the written survey that you may receive in the mail after your visit with us. Thank you!             Your Updated Medication List - Protect others around you: Learn how to safely use, store and throw away your medicines at www.disposemymeds.org.          This list is accurate as of: 1/13/18  4:08 PM.  Always use your most recent med list.                   Brand Name Dispense Instructions for use Diagnosis    amoxicillin 500 MG capsule    AMOXIL    14 capsule    Take 1 capsule (500 mg) by mouth 2 times daily for 7 days    Acute cystitis without hematuria       TYLENOL PO

## 2018-01-14 ENCOUNTER — HOSPITAL ENCOUNTER (EMERGENCY)
Facility: CLINIC | Age: 28
Discharge: HOME | End: 2018-01-14
Attending: EMERGENCY MEDICINE | Admitting: EMERGENCY MEDICINE
Payer: COMMERCIAL

## 2018-01-14 ENCOUNTER — APPOINTMENT (OUTPATIENT)
Dept: ULTRASOUND IMAGING | Facility: CLINIC | Age: 28
End: 2018-01-14
Attending: EMERGENCY MEDICINE
Payer: COMMERCIAL

## 2018-01-14 ENCOUNTER — TELEPHONE (OUTPATIENT)
Dept: ALLERGY | Facility: CLINIC | Age: 28
End: 2018-01-14

## 2018-01-14 VITALS
SYSTOLIC BLOOD PRESSURE: 112 MMHG | HEART RATE: 98 BPM | OXYGEN SATURATION: 99 % | DIASTOLIC BLOOD PRESSURE: 70 MMHG | TEMPERATURE: 98.8 F | RESPIRATION RATE: 16 BRPM

## 2018-01-14 DIAGNOSIS — A74.9 CHLAMYDIA: ICD-10-CM

## 2018-01-14 DIAGNOSIS — Z34.92 SECOND TRIMESTER PREGNANCY: ICD-10-CM

## 2018-01-14 DIAGNOSIS — A74.9 CHLAMYDIA: Primary | ICD-10-CM

## 2018-01-14 LAB
BACTERIA SPEC CULT: NORMAL
C TRACH DNA SPEC QL NAA+PROBE: POSITIVE
Lab: NORMAL
N GONORRHOEA DNA SPEC QL NAA+PROBE: NEGATIVE
SPECIMEN SOURCE: ABNORMAL
SPECIMEN SOURCE: NORMAL
SPECIMEN SOURCE: NORMAL

## 2018-01-14 PROCEDURE — 99284 EMERGENCY DEPT VISIT MOD MDM: CPT

## 2018-01-14 PROCEDURE — 99284 EMERGENCY DEPT VISIT MOD MDM: CPT | Mod: Z6 | Performed by: EMERGENCY MEDICINE

## 2018-01-14 PROCEDURE — 25000132 ZZH RX MED GY IP 250 OP 250 PS 637: Performed by: EMERGENCY MEDICINE

## 2018-01-14 PROCEDURE — 76815 OB US LIMITED FETUS(S): CPT

## 2018-01-14 RX ORDER — AZITHROMYCIN 500 MG/1
1000 TABLET, FILM COATED ORAL ONCE
Qty: 2 TABLET | Refills: 0 | Status: SHIPPED | OUTPATIENT
Start: 2018-01-14 | End: 2018-01-14

## 2018-01-14 RX ORDER — AZITHROMYCIN 250 MG/1
1000 TABLET, FILM COATED ORAL ONCE
Status: COMPLETED | OUTPATIENT
Start: 2018-01-14 | End: 2018-01-14

## 2018-01-14 RX ADMIN — AZITHROMYCIN 1000 MG: 250 TABLET, FILM COATED ORAL at 19:46

## 2018-01-14 ASSESSMENT — PAIN DESCRIPTION - DESCRIPTORS
DESCRIPTORS: CRAMPING

## 2018-01-14 ASSESSMENT — ENCOUNTER SYMPTOMS
CARDIOVASCULAR NEGATIVE: 1
RESPIRATORY NEGATIVE: 1
ENDOCRINE NEGATIVE: 1
PSYCHIATRIC NEGATIVE: 1
NEUROLOGICAL NEGATIVE: 1
MUSCULOSKELETAL NEGATIVE: 1
CONSTITUTIONAL NEGATIVE: 1
ALLERGIC/IMMUNOLOGIC NEGATIVE: 1
EYES NEGATIVE: 1
ABDOMINAL PAIN: 1
HEMATOLOGIC/LYMPHATIC NEGATIVE: 1

## 2018-01-14 NOTE — ED AVS SNAPSHOT
[facility] Emergency Department    [address] [place] [address]    Phone:  [phone]    Fax:  [phone]                                       Mora Reardon   MRN: [id number]    Department:  [facility] Emergency Department   Date of Visit:  1/14/2018           Patient Information     Date Of Birth          1990        Your diagnoses for this visit were:     Chlamydia     Second trimester pregnancy Wtih abdominal cramping without vaginal bleeding .  20 weeks and 3 days by pelvic ultrasound       You were seen by Deni Fakl MD.      Follow-up Information     Follow up with [facility] Emergency Department.    Specialty:  EMERGENCY MEDICINE    Why:  Please make sure your sexual partners are notified that you have chlamydia and that they are treated accordingly.    Contact information:    [address] [place] [address] [phone]    Additional information:    The medical center is located at   [address] [place] [address] (between [facility] and   [address], four miles north   of [place]).        Follow up with [facility].    Specialty:  OB/Gyn    Why:  Follow-up with your OB/GYN team.  You  will need to be tested to ensure that you are cured of chlamydia. This can be done at your clinic appointment on January 18th with Dr Mckinney    Contact information:    [address] [place] [address] [phone]    Additional information:    The medical center is located at   [address] [place] [address] (between [facility] and   [address], four miles north   of [place]).        Follow up with [facility] Emergency Department.    Specialty:  EMERGENCY MEDICINE    Why:  We will need to return to the emergency department if you have fever, or any other symptoms.     Contact information:    [address] [place] [address] [phone]    Additional information:    The medical center is located at    [address] [facility]. (between [address]   [address] in [place], four miles north   of [place]).      Discharge References/Attachments     CHLAMYDIA (FEMALE) (ENGLISH)    PREGNANCY: YOUR SECOND TRIMESTER CHANGES (ENGLISH)    PREGNANCY, ADAPTING TO: SECOND TRIMESTER (ENGLISH)      Future Appointments        Provider Department Dept Phone Center    1/18/2018 9:15 AM Katey Mckinney MD [facility] [phone] [place]    1/18/2018 10:15 AM [facility] ULTRASOUND RM 1 [facility] [phone] [facility]    2/15/2018 9:00 AM Katey Mckinney MD [facility] [phone] [facility]      24 Hour Appointment Hotline       To make an appointment at any [facility], call [phone] ([phone]). If you don't have a family doctor or clinic, we will help you find one. [facility] are conveniently located to serve the needs of you and your family.             Review of your medicines      Our records show that you are taking the medicines listed below. If these are incorrect, please call your family doctor or clinic.        Dose / Directions Last dose taken    TYLENOL PO        Refills:  0                Procedures and tests performed during your visit     US OB Limited One Or More Fetuses      Orders Needing Specimen Collection     None      Pending Results     Date and Time Order Name Status Description    1/14/2018 1936 US OB Limited One Or More Fetuses Preliminary     1/13/2018 1550 HEPATITIS B SURFACE ANTIGEN In process     1/13/2018 1550 HEPATITIS C SCREEN REFLEX TO HCV RNA QUANT AND GENOTYPE In process             Pending Culture Results     No orders found from 1/12/2018 to 1/15/2018.            Pending Results Instructions     If you had any lab results that were not finalized at the time of your Discharge, you can call the ED Lab Result RN at [phone]. You will be contacted by this team for any positive Lab results or changes in treatment.  The nurses are available 7 days a week from 10A to 6:30P.  You can leave a message 24 hours per day and they will return your call.        Test Results From Your Hospital Stay        2018  8:40 PM      Narrative     US OBSTETRIC LIMITED ONE OR MORE FETUSES  2018 8:31 PM    HISTORY: Abdominal pain and cramping. Positive endocervical culture  for chlamydia.  She is a  who is reported to be about 19 weeks.   Evaluate for any other process.    COMPARISON: None.    FINDINGS:     Presentation: Variable with variable lie.  Cardiac activity: 147 bpm. Regular rhythm.  Movement: Unremarkable.  Placenta: Anterior. No evidence for placenta previa.  Adnexa: Unremarkable.   Cervical length: 3.2.   Amniotic fluid: Unremarkable.   Umbilical artery S/D ratio: Not obtained.     Other findings: None.  A complete anatomy scan was not performed.     Measured parameters:       BPD:  4.7 cm      Age: 20 weeks and 2 days.       HC:    17.9 cm      Age: 20 weeks and 3 days.       AC:  16 cm      Age: 21 weeks and 1 day.       FL:   3.1 cm      Age: 19 weeks and 5 days.    Gestational age by current ultrasound measurement: 20 weeks and 3  days, corresponding to an KRISTIE of 2018.    Gestational age based on the reported previously established due date:  18 weeks and 6 days, corresponding to an KRISTIE of 2018.    Estimated fetal weight: 350 grams, corresponding to the 68th  percentile based on the reported previously established due date.         Impression     IMPRESSION:    1. Single live intrauterine pregnancy of 20 weeks and 3 days gestation  by current ultrasound measurement. Fetal growth is 11 days more  advanced than what is expected from the reported previously  established due date.  2. Otherwise unremarkable limited obstetric ultrasound.                   Thank you for choosing [facility]       Thank you for choosing [facility] for your care. Our goal is always to provide you with excellent care. Hearing back from our  "patients is one way we can continue to improve our services. Please take a few minutes to complete the written survey that you may receive in the mail after you visit with us. Thank you!        [employer] Information     [employer] lets you send messages to your doctor, view your test results, renew your prescriptions, schedule appointments and more. To sign up, go to [email or web address] . Click on \"Log in\" on the left side of the screen, which will take you to the Welcome page. Then click on \"Sign up Now\" on the right side of the page.     You will be asked to enter the access code listed below, as well as some personal information. Please follow the directions to create your username and password.     Your access code is: [email or web address]  Expires: 2018  4:08 PM     Your access code will  in 90 days. If you need help or a new code, please call your [facility] clinic or [phone].        Care EveryWhere ID     This is your Care EveryWhere ID. This could be used by other organizations to access your [facility] medical records  [id number]        Equal Access to Services     RODGER LEWIS : Hadmaria t Diggs, trina fung, nancy loza, tawny vega . So [facility] [phone].    ATENCIÓN: Si habla español, tiene a salgado disposición servicios gratuitos de asistencia lingüística. Lien al [phone].    We comply with applicable federal civil rights laws and [place] laws. We do not discriminate on the basis of race, color, national origin, age, disability, sex, sexual orientation, or gender identity.            After Visit Summary       This is your record. Keep this with you and show to your community pharmacist(s) and doctor(s) at your next visit.                  "

## 2018-01-14 NOTE — TELEPHONE ENCOUNTER
Chlamydia positive will treat with a course of Azithymicin   Will need to follow-up with OBGYN in 2 weeks for a recheck to ensure the infection has cleared.      Patient states she has pelvic pain ne on set from yesterday.  Advised patient she needs to be seen in the Emergency department.      WBC's noted in urinalysis 01/13/2018 started on amoxicillin. Urine culture to day is negative.  If final is negative will have her discontinue the Amoxicillin.      Maria Eugenia Murcia CNP

## 2018-01-14 NOTE — ED AVS SNAPSHOT
[facility] Emergency Department    [address] [facility] [address]    Phone:  [phone]    Fax:  [phone]                                       Mora Reardon   MRN: [id number]    Department:  [facility] Emergency Department   Date of Visit:  1/14/2018           After Visit Summary Signature Page     I have received my discharge instructions, and my questions have been answered. I have discussed any challenges I see with this plan with the nurse or doctor.    ..........................................................................................................................................  Patient/Patient Representative Signature      ..........................................................................................................................................  Patient Representative Print Name and Relationship to Patient    ..................................................               ................................................  Date                                            Time    ..........................................................................................................................................  Reviewed by Signature/Title    ...................................................              ..............................................  Date                                                            Time

## 2018-01-15 LAB
HBV SURFACE AG SERPL QL IA: NONREACTIVE
HCV AB SERPL QL IA: NONREACTIVE

## 2018-01-15 NOTE — ED NOTES
PT is resting in position of comfort. All needs are being met and all comfort measures are being addressed. Awaiting MD dispo at this time. All needs are being met and all comfort measures are being addressed. Awaiting MD dispo at this time. I will continue to assess and monitor PT.
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
## 2018-01-15 NOTE — ED NOTES
"Was given RX for antibiotics yesterday for UTI did not  got call today that her chlamydia test was positive  Around 0930- 1000 began with low abd cramping denies bleeding  Has not felt baby move- \"only some fluttering about a month ago\"  Last OB appt about a month ago  "

## 2018-01-15 NOTE — ED NOTES
Back from US and placed on the monitor. No change sin PT condition from previous assessment. All needs are being met and all comfort measures are being addressed. Awaiting MD dispo at this time. I will continue to assess and monition  PT.

## 2018-01-15 NOTE — ED NOTES
Report received and I will assume care of PT at this time.   Entered PT room and found PT resting in position of comfort. PT states she is having 5/10 lower ABD pain and cramping in nature. PT is A&OX4, appears to be in mild discomfort. Awaiting US tech at this time.

## 2018-04-24 ENCOUNTER — PRENATAL OFFICE VISIT (OUTPATIENT)
Dept: OBGYN | Facility: CLINIC | Age: 28
End: 2018-04-24

## 2018-04-24 VITALS
DIASTOLIC BLOOD PRESSURE: 65 MMHG | HEART RATE: 100 BPM | WEIGHT: 184 LBS | HEIGHT: 63 IN | TEMPERATURE: 98.9 F | SYSTOLIC BLOOD PRESSURE: 129 MMHG | RESPIRATION RATE: 16 BRPM | BODY MASS INDEX: 32.6 KG/M2

## 2018-04-24 DIAGNOSIS — Z67.91 RH NEGATIVE STATE IN ANTEPARTUM PERIOD, THIRD TRIMESTER: ICD-10-CM

## 2018-04-24 DIAGNOSIS — O26.893 RH NEGATIVE STATE IN ANTEPARTUM PERIOD, THIRD TRIMESTER: ICD-10-CM

## 2018-04-24 DIAGNOSIS — Z34.83 PRENATAL CARE, SUBSEQUENT PREGNANCY IN THIRD TRIMESTER: Primary | ICD-10-CM

## 2018-04-24 PROCEDURE — 90715 TDAP VACCINE 7 YRS/> IM: CPT | Performed by: OBSTETRICS & GYNECOLOGY

## 2018-04-24 PROCEDURE — 90471 IMMUNIZATION ADMIN: CPT | Performed by: OBSTETRICS & GYNECOLOGY

## 2018-04-24 PROCEDURE — 96372 THER/PROPH/DIAG INJ SC/IM: CPT | Performed by: OBSTETRICS & GYNECOLOGY

## 2018-04-24 PROCEDURE — 99207 ZZC PRENATAL VISIT: CPT | Performed by: OBSTETRICS & GYNECOLOGY

## 2018-04-24 NOTE — MR AVS SNAPSHOT
"              After Visit Summary   4/24/2018    Mora Reardon    MRN: [id number]           Patient Information     Date Of Birth          1990        Visit Information        Provider Department      4/24/2018 3:00 PM Lina Butler MD [facility]        Today's Diagnoses     Prenatal care, subsequent pregnancy in third trimester    -  1    Rh negative state in antepartum period, third trimester           Follow-ups after your visit        Future tests that were ordered for you today     Open Future Orders        Priority Expected Expires Ordered    Basic metabolic panel Routine  5/8/2018 4/24/2018    Glucose tolerance gest screen 1 hour Routine  5/8/2018 4/24/2018    CBC with platelets Routine  5/8/2018 4/24/2018    Anti Treponema Routine  5/8/2018 4/24/2018            Who to contact     If you have questions or need follow up information about today's clinic visit or your schedule please contact [facility] directly at [phone].  Normal or non-critical lab and imaging results will be communicated to you by Dealupahart, letter or phone within 4 business days after the clinic has received the results. If you do not hear from us within 7 days, please contact the clinic through Dealupahart or phone. If you have a critical or abnormal lab result, we will notify you by phone as soon as possible.  Submit refill requests through YOOWALK or call your pharmacy and they will forward the refill request to us. Please allow 3 business days for your refill to be completed.          Additional Information About Your Visit        MyChart Information     YOOWALK lets you send messages to your doctor, view your test results, renew your prescriptions, schedule appointments and more. To sign up, go to [email or web address] . Click on \"Log in\" on the left side of the screen, which will take you to the Welcome page. Then click on \"Sign up Now\" on the right side of the page.     You " "will be asked to enter the access code listed below, as well as some personal information. Please follow the directions to create your username and password.     Your access code is: [email or web address]  Expires: 2018  4:06 PM     Your access code will  in 90 days. If you need help or a new code, please call your [place] clinic or [phone].        Care EveryWhere ID     This is your Care EveryWhere ID. This could be used by other organizations to access your [place] medical records  [id number]        Your Vitals Were     Pulse Temperature Respirations Height Last Period BMI (Body Mass Index)    100 98.9  F (37.2  C) 16 5' 2.5\" (1.588 m) (LMP Unknown) 33.12 kg/m2       Blood Pressure from Last 3 Encounters:   18 129/65   18 112/70   18 109/63    Weight from Last 3 Encounters:   18 184 lb (83.5 kg)   17 163 lb 9.6 oz (74.2 kg)   17 157 lb 3.2 oz (71.3 kg)              We Performed the Following     INJECTION INTRAMUSCULAR OR SUB-Q     RH IMMUNE GLOBULIN     TDAP VACCINE (ADACEL)     VACCINE ADMINISTRATION, INITIAL          Today's Medication Changes          These changes are accurate as of 18  4:06 PM.  If you have any questions, ask your nurse or doctor.               Stop taking these medicines if you haven't already. Please contact your care team if you have questions.     TYLENOL PO   Stopped by:  Lina Butler MD                    Primary Care Provider Office Phone # Fax #    [facility] [phone] [address] [address]        Equal Access to Services     RODGER LEWIS : Kristi Diggs, trina fung, tawny pablo. So [facility] [phone].    ATENCIÓN: Si habla español, tiene a salgado disposición servicios gratuitos de asistencia lingüística. Llame al [phone].    We comply with applicable federal civil rights laws and " [place] laws. We do not discriminate on the basis of race, color, national origin, age, disability, sex, sexual orientation, or gender identity.            Thank you!     Thank you for choosing [facility]  for your care. Our goal is always to provide you with excellent care. Hearing back from our patients is one way we can continue to improve our services. Please take a few minutes to complete the written survey that you may receive in the mail after your visit with us. Thank you!             Your Updated Medication List - Protect others around you: Learn how to safely use, store and throw away your medicines at www.disposemymeds.org.      Notice  As of 4/24/2018  4:06 PM    You have not been prescribed any medications.

## 2018-04-24 NOTE — NURSING NOTE
"Initial /65  Pulse 100  Temp 98.9  F (37.2  C)  Resp 16  Ht 5' 2.5\" (1.588 m)  Wt 184 lb (83.5 kg)  LMP  (LMP Unknown)  BMI 33.12 kg/m2 Estimated body mass index is 33.12 kg/(m^2) as calculated from the following:    Height as of this encounter: 5' 2.5\" (1.588 m).    Weight as of this encounter: 184 lb (83.5 kg). .      "

## 2018-04-24 NOTE — PROGRESS NOTES
"CC: prenatal  Rh negative  S: no ctx/lof/vb/dc.  Good fm.  Just bought a new place-mobile home.  She is really happy.  Still smoking. Discussed drug and she denies.  She couldn't come in for awhile as she was busy moving into her new place.  No other issues.    /65  Pulse 100  Temp 98.9  F (37.2  C)  Resp 16  Ht 5' 2.5\" (1.588 m)  Wt 184 lb (83.5 kg)  LMP  (LMP Unknown)  BMI 33.12 kg/m2  A/P rh negative-rhogam today  Labs ordered-she can't do today, encouraged  tdap discussed-she agrees  Routine precautions  F/u 2weeks-planning to see Dr. Mckinney in [place].    Birth control discussed-Mirena planned  Not planning on breastfeeding-had trouble before  Pain management-planning on an intrathecal  Lina Butler MD    "

## 2018-05-12 ENCOUNTER — NURSE TRIAGE (OUTPATIENT)
Dept: NURSING | Facility: CLINIC | Age: 28
End: 2018-05-12

## 2018-05-12 ENCOUNTER — TELEPHONE (OUTPATIENT)
Dept: OBGYN | Facility: CLINIC | Age: 28
End: 2018-05-12

## 2018-05-12 NOTE — TELEPHONE ENCOUNTER
"Patient called about 1320 \"wondering if I should come in?\"  States she has \"soaked her underwear twice with water white fluid\" then clarified \"mostly fluid\".  Denied other concerns.  "

## 2018-05-12 NOTE — TELEPHONE ENCOUNTER
"Caller is 35 weeks pregnant and has had two episodes of leaking clear/white fluid that has soaked her underwear, no contractions, increased fetal movement, no fever, no swelling, conference called her with OB station at L&D for 2nd level triage.     Reason for Disposition    Leakage of fluid from vagina    Additional Information    Negative: [1] SEVERE abdominal pain (e.g., excruciating) AND [2] constant AND [3] present > 1 hour    Negative: Severe bleeding (e.g., continuous red blood from vagina, or large blood clots)    Negative: Umbilical cord hanging out of the vagina (shiny, white, curled appearance, \"like telephone cord\")    Negative: Uncontrollable urge to push (i.e., feels like baby is coming out now)    Negative: Can see baby    Negative: Sounds like a life-threatening emergency to the triager    Negative: < 20 weeks pregnant    Negative: Vaginal bleeding    Protocols used: PREGNANCY - RUPTURE OF MEMBRANES-ADULT-    Zari Can RN, BSN  [facility]    "

## 2018-05-22 ENCOUNTER — ANESTHESIA (OUTPATIENT)
Dept: OBGYN | Facility: CLINIC | Age: 28
End: 2018-05-22

## 2018-05-22 ENCOUNTER — ANESTHESIA EVENT (OUTPATIENT)
Dept: OBGYN | Facility: CLINIC | Age: 28
End: 2018-05-22

## 2018-05-22 ENCOUNTER — HOSPITAL ENCOUNTER (INPATIENT)
Facility: CLINIC | Age: 28
LOS: 2 days | Discharge: HOME | End: 2018-05-24
Attending: OBSTETRICS & GYNECOLOGY | Admitting: OBSTETRICS & GYNECOLOGY

## 2018-05-22 PROBLEM — Z37.9 NORMAL LABOR: Status: ACTIVE | Noted: 2018-05-22

## 2018-05-22 LAB
ABO + RH BLD: NORMAL
ABO + RH BLD: NORMAL
AMPHETAMINES UR QL SCN: NEGATIVE
ANION GAP SERPL CALCULATED.3IONS-SCNC: 9 MMOL/L (ref 3–14)
BARBITURATES UR QL: NEGATIVE
BASOPHILS # BLD AUTO: 0 10E9/L (ref 0–0.2)
BASOPHILS NFR BLD AUTO: 0.4 %
BENZODIAZ UR QL: NEGATIVE
BLOOD BANK CMNT PATIENT-IMP: NORMAL
BUN SERPL-MCNC: 9 MG/DL (ref 7–30)
C TRACH DNA SPEC QL NAA+PROBE: ABNORMAL
CALCIUM SERPL-MCNC: 8.7 MG/DL (ref 8.5–10.1)
CANNABINOIDS UR QL SCN: NEGATIVE
CHLORIDE SERPL-SCNC: 104 MMOL/L (ref 94–109)
CO2 SERPL-SCNC: 26 MMOL/L (ref 20–32)
COCAINE UR QL: NEGATIVE
CREAT SERPL-MCNC: 0.7 MG/DL (ref 0.52–1.04)
DIFFERENTIAL METHOD BLD: ABNORMAL
EOSINOPHIL # BLD AUTO: 0.3 10E9/L (ref 0–0.7)
EOSINOPHIL NFR BLD AUTO: 2.2 %
ERYTHROCYTE [DISTWIDTH] IN BLOOD BY AUTOMATED COUNT: 14.9 % (ref 10–15)
GFR SERPL CREATININE-BSD FRML MDRD: >90 ML/MIN/1.7M2
GLUCOSE SERPL-MCNC: 86 MG/DL (ref 70–99)
HCT VFR BLD AUTO: 38 % (ref 35–47)
HGB BLD-MCNC: 12.5 G/DL (ref 11.7–15.7)
IMM GRANULOCYTES # BLD: 0.1 10E9/L (ref 0–0.4)
IMM GRANULOCYTES NFR BLD: 0.7 %
LYMPHOCYTES # BLD AUTO: 2 10E9/L (ref 0.8–5.3)
LYMPHOCYTES NFR BLD AUTO: 17.4 %
MCH RBC QN AUTO: 28.6 PG (ref 26.5–33)
MCHC RBC AUTO-ENTMCNC: 32.9 G/DL (ref 31.5–36.5)
MCV RBC AUTO: 87 FL (ref 78–100)
MONOCYTES # BLD AUTO: 0.5 10E9/L (ref 0–1.3)
MONOCYTES NFR BLD AUTO: 4.6 %
NEUTROPHILS # BLD AUTO: 8.5 10E9/L (ref 1.6–8.3)
NEUTROPHILS NFR BLD AUTO: 74.7 %
OPIATES UR QL SCN: NEGATIVE
PCP UR QL SCN: NEGATIVE
PLATELET # BLD AUTO: 282 10E9/L (ref 150–450)
POTASSIUM SERPL-SCNC: 3.3 MMOL/L (ref 3.4–5.3)
RBC # BLD AUTO: 4.37 10E12/L (ref 3.8–5.2)
SODIUM SERPL-SCNC: 139 MMOL/L (ref 133–144)
SPECIMEN EXP DATE BLD: NORMAL
SPECIMEN SOURCE: ABNORMAL
WBC # BLD AUTO: 11.4 10E9/L (ref 4–11)

## 2018-05-22 PROCEDURE — 3E0R3BZ INTRODUCTION OF ANESTHETIC AGENT INTO SPINAL CANAL, PERCUTANEOUS APPROACH: ICD-10-PCS | Performed by: OBSTETRICS & GYNECOLOGY

## 2018-05-22 PROCEDURE — 37000011 ZZH ANESTHESIA WARD SERVICE: Performed by: NURSE ANESTHETIST, CERTIFIED REGISTERED

## 2018-05-22 PROCEDURE — 36415 COLL VENOUS BLD VENIPUNCTURE: CPT | Performed by: OBSTETRICS & GYNECOLOGY

## 2018-05-22 PROCEDURE — 12000027 ZZH R&B OB

## 2018-05-22 PROCEDURE — 25000132 ZZH RX MED GY IP 250 OP 250 PS 637: Performed by: OBSTETRICS & GYNECOLOGY

## 2018-05-22 PROCEDURE — 72200001 ZZH LABOR CARE VAGINAL DELIVERY SINGLE

## 2018-05-22 PROCEDURE — 25000128 H RX IP 250 OP 636: Performed by: NURSE ANESTHETIST, CERTIFIED REGISTERED

## 2018-05-22 PROCEDURE — 86901 BLOOD TYPING SEROLOGIC RH(D): CPT | Performed by: OBSTETRICS & GYNECOLOGY

## 2018-05-22 PROCEDURE — 25000125 ZZHC RX 250: Performed by: NURSE ANESTHETIST, CERTIFIED REGISTERED

## 2018-05-22 PROCEDURE — 80307 DRUG TEST PRSMV CHEM ANLYZR: CPT | Performed by: OBSTETRICS & GYNECOLOGY

## 2018-05-22 PROCEDURE — 59400 OBSTETRICAL CARE: CPT | Performed by: OBSTETRICS & GYNECOLOGY

## 2018-05-22 PROCEDURE — 87653 STREP B DNA AMP PROBE: CPT | Performed by: OBSTETRICS & GYNECOLOGY

## 2018-05-22 PROCEDURE — 40000671 ZZH STATISTIC ANESTHESIA CASE

## 2018-05-22 PROCEDURE — 85025 COMPLETE CBC W/AUTO DIFF WBC: CPT | Performed by: OBSTETRICS & GYNECOLOGY

## 2018-05-22 PROCEDURE — 80048 BASIC METABOLIC PNL TOTAL CA: CPT | Performed by: OBSTETRICS & GYNECOLOGY

## 2018-05-22 PROCEDURE — 86900 BLOOD TYPING SEROLOGIC ABO: CPT | Performed by: OBSTETRICS & GYNECOLOGY

## 2018-05-22 PROCEDURE — 00HU33Z INSERTION OF INFUSION DEVICE INTO SPINAL CANAL, PERCUTANEOUS APPROACH: ICD-10-PCS | Performed by: OBSTETRICS & GYNECOLOGY

## 2018-05-22 PROCEDURE — 86780 TREPONEMA PALLIDUM: CPT | Performed by: OBSTETRICS & GYNECOLOGY

## 2018-05-22 PROCEDURE — 25000125 ZZHC RX 250: Performed by: OBSTETRICS & GYNECOLOGY

## 2018-05-22 PROCEDURE — 25000128 H RX IP 250 OP 636: Performed by: OBSTETRICS & GYNECOLOGY

## 2018-05-22 RX ORDER — ACETAMINOPHEN 325 MG/1
650 TABLET ORAL EVERY 4 HOURS PRN
Status: DISCONTINUED | OUTPATIENT
Start: 2018-05-22 | End: 2018-05-22

## 2018-05-22 RX ORDER — METHYLERGONOVINE MALEATE 0.2 MG/ML
200 INJECTION INTRAVENOUS
Status: DISCONTINUED | OUTPATIENT
Start: 2018-05-22 | End: 2018-05-22

## 2018-05-22 RX ORDER — POTASSIUM CHLORIDE 7.45 MG/ML
10 INJECTION INTRAVENOUS
Status: DISCONTINUED | OUTPATIENT
Start: 2018-05-22 | End: 2018-05-22

## 2018-05-22 RX ORDER — BUPIVACAINE HYDROCHLORIDE 7.5 MG/ML
INJECTION, SOLUTION INTRASPINAL PRN
Status: DISCONTINUED | OUTPATIENT
Start: 2018-05-22 | End: 2018-05-22

## 2018-05-22 RX ORDER — POTASSIUM CHLORIDE 1500 MG/1
20-40 TABLET, EXTENDED RELEASE ORAL
Status: DISCONTINUED | OUTPATIENT
Start: 2018-05-22 | End: 2018-05-24 | Stop reason: HOSPADM

## 2018-05-22 RX ORDER — NYSTATIN 100000 U/G
OINTMENT TOPICAL 2 TIMES DAILY
Status: DISCONTINUED | OUTPATIENT
Start: 2018-05-22 | End: 2018-05-22

## 2018-05-22 RX ORDER — NALBUPHINE HYDROCHLORIDE 10 MG/ML
2.5-5 INJECTION, SOLUTION INTRAMUSCULAR; INTRAVENOUS; SUBCUTANEOUS EVERY 6 HOURS PRN
Status: DISCONTINUED | OUTPATIENT
Start: 2018-05-22 | End: 2018-05-22

## 2018-05-22 RX ORDER — LIDOCAINE HYDROCHLORIDE 10 MG/ML
INJECTION, SOLUTION INFILTRATION; PERINEURAL PRN
Status: DISCONTINUED | OUTPATIENT
Start: 2018-05-22 | End: 2018-05-22

## 2018-05-22 RX ORDER — BISACODYL 10 MG
10 SUPPOSITORY, RECTAL RECTAL DAILY PRN
Status: DISCONTINUED | OUTPATIENT
Start: 2018-05-24 | End: 2018-05-24 | Stop reason: HOSPADM

## 2018-05-22 RX ORDER — POTASSIUM CHLORIDE 29.8 MG/ML
20 INJECTION INTRAVENOUS
Status: DISCONTINUED | OUTPATIENT
Start: 2018-05-22 | End: 2018-05-22 | Stop reason: CLARIF

## 2018-05-22 RX ORDER — EPINEPHRINE 1 MG/ML
INJECTION, SOLUTION, CONCENTRATE INTRAVENOUS PRN
Status: DISCONTINUED | OUTPATIENT
Start: 2018-05-22 | End: 2018-05-22

## 2018-05-22 RX ORDER — POTASSIUM CHLORIDE 1.5 G/1.58G
20-40 POWDER, FOR SOLUTION ORAL
Status: DISCONTINUED | OUTPATIENT
Start: 2018-05-22 | End: 2018-05-24 | Stop reason: HOSPADM

## 2018-05-22 RX ORDER — POTASSIUM CHLORIDE 1.5 G/1.58G
20-40 POWDER, FOR SOLUTION ORAL
Status: DISCONTINUED | OUTPATIENT
Start: 2018-05-22 | End: 2018-05-22

## 2018-05-22 RX ORDER — IBUPROFEN 800 MG/1
800 TABLET, FILM COATED ORAL EVERY 6 HOURS PRN
Status: DISCONTINUED | OUTPATIENT
Start: 2018-05-22 | End: 2018-05-24 | Stop reason: HOSPADM

## 2018-05-22 RX ORDER — CARBOPROST TROMETHAMINE 250 UG/ML
250 INJECTION, SOLUTION INTRAMUSCULAR
Status: DISCONTINUED | OUTPATIENT
Start: 2018-05-22 | End: 2018-05-22

## 2018-05-22 RX ORDER — POTASSIUM CL/LIDO/0.9 % NACL 10MEQ/0.1L
10 INTRAVENOUS SOLUTION, PIGGYBACK (ML) INTRAVENOUS
Status: DISCONTINUED | OUTPATIENT
Start: 2018-05-22 | End: 2018-05-24 | Stop reason: HOSPADM

## 2018-05-22 RX ORDER — OXYTOCIN/0.9 % SODIUM CHLORIDE 30/500 ML
340 PLASTIC BAG, INJECTION (ML) INTRAVENOUS CONTINUOUS PRN
Status: DISCONTINUED | OUTPATIENT
Start: 2018-05-22 | End: 2018-05-24 | Stop reason: HOSPADM

## 2018-05-22 RX ORDER — AMOXICILLIN 250 MG
1 CAPSULE ORAL 2 TIMES DAILY
Status: DISCONTINUED | OUTPATIENT
Start: 2018-05-22 | End: 2018-05-24 | Stop reason: HOSPADM

## 2018-05-22 RX ORDER — POTASSIUM CHLORIDE 29.8 MG/ML
20 INJECTION INTRAVENOUS
Status: DISCONTINUED | OUTPATIENT
Start: 2018-05-22 | End: 2018-05-22 | Stop reason: RX

## 2018-05-22 RX ORDER — OXYTOCIN/0.9 % SODIUM CHLORIDE 30/500 ML
100-340 PLASTIC BAG, INJECTION (ML) INTRAVENOUS CONTINUOUS PRN
Status: COMPLETED | OUTPATIENT
Start: 2018-05-22 | End: 2018-05-22

## 2018-05-22 RX ORDER — OXYTOCIN/0.9 % SODIUM CHLORIDE 30/500 ML
100 PLASTIC BAG, INJECTION (ML) INTRAVENOUS CONTINUOUS
Status: DISCONTINUED | OUTPATIENT
Start: 2018-05-22 | End: 2018-05-24 | Stop reason: HOSPADM

## 2018-05-22 RX ORDER — NALOXONE HYDROCHLORIDE 0.4 MG/ML
.1-.4 INJECTION, SOLUTION INTRAMUSCULAR; INTRAVENOUS; SUBCUTANEOUS
Status: DISCONTINUED | OUTPATIENT
Start: 2018-05-22 | End: 2018-05-22

## 2018-05-22 RX ORDER — OXYTOCIN/0.9 % SODIUM CHLORIDE 30/500 ML
1-24 PLASTIC BAG, INJECTION (ML) INTRAVENOUS CONTINUOUS
Status: DISCONTINUED | OUTPATIENT
Start: 2018-05-22 | End: 2018-05-22

## 2018-05-22 RX ORDER — NALOXONE HYDROCHLORIDE 0.4 MG/ML
.1-.4 INJECTION, SOLUTION INTRAMUSCULAR; INTRAVENOUS; SUBCUTANEOUS
Status: DISCONTINUED | OUTPATIENT
Start: 2018-05-22 | End: 2018-05-24 | Stop reason: HOSPADM

## 2018-05-22 RX ORDER — OXYCODONE AND ACETAMINOPHEN 5; 325 MG/1; MG/1
1 TABLET ORAL
Status: DISCONTINUED | OUTPATIENT
Start: 2018-05-22 | End: 2018-05-22

## 2018-05-22 RX ORDER — LANOLIN 100 %
OINTMENT (GRAM) TOPICAL
Status: DISCONTINUED | OUTPATIENT
Start: 2018-05-22 | End: 2018-05-24 | Stop reason: HOSPADM

## 2018-05-22 RX ORDER — IBUPROFEN 800 MG/1
800 TABLET, FILM COATED ORAL
Status: DISCONTINUED | OUTPATIENT
Start: 2018-05-22 | End: 2018-05-22

## 2018-05-22 RX ORDER — SODIUM CHLORIDE, SODIUM LACTATE, POTASSIUM CHLORIDE, CALCIUM CHLORIDE 600; 310; 30; 20 MG/100ML; MG/100ML; MG/100ML; MG/100ML
INJECTION, SOLUTION INTRAVENOUS CONTINUOUS
Status: DISCONTINUED | OUTPATIENT
Start: 2018-05-22 | End: 2018-05-22

## 2018-05-22 RX ORDER — HYDROCORTISONE 2.5 %
CREAM (GRAM) TOPICAL 3 TIMES DAILY PRN
Status: DISCONTINUED | OUTPATIENT
Start: 2018-05-22 | End: 2018-05-24 | Stop reason: HOSPADM

## 2018-05-22 RX ORDER — POTASSIUM CHLORIDE 1500 MG/1
20-40 TABLET, EXTENDED RELEASE ORAL
Status: DISCONTINUED | OUTPATIENT
Start: 2018-05-22 | End: 2018-05-22

## 2018-05-22 RX ORDER — EPHEDRINE SULFATE 50 MG/ML
5 INJECTION, SOLUTION INTRAMUSCULAR; INTRAVENOUS; SUBCUTANEOUS
Status: DISCONTINUED | OUTPATIENT
Start: 2018-05-22 | End: 2018-05-22

## 2018-05-22 RX ORDER — OXYCODONE HYDROCHLORIDE 5 MG/1
5 TABLET ORAL EVERY 4 HOURS PRN
Status: DISCONTINUED | OUTPATIENT
Start: 2018-05-22 | End: 2018-05-24 | Stop reason: HOSPADM

## 2018-05-22 RX ORDER — OXYTOCIN 10 [USP'U]/ML
10 INJECTION, SOLUTION INTRAMUSCULAR; INTRAVENOUS
Status: DISCONTINUED | OUTPATIENT
Start: 2018-05-22 | End: 2018-05-24 | Stop reason: HOSPADM

## 2018-05-22 RX ORDER — POTASSIUM CHLORIDE 7.45 MG/ML
10 INJECTION INTRAVENOUS
Status: DISCONTINUED | OUTPATIENT
Start: 2018-05-22 | End: 2018-05-24 | Stop reason: HOSPADM

## 2018-05-22 RX ORDER — LIDOCAINE HYDROCHLORIDE AND EPINEPHRINE 15; 5 MG/ML; UG/ML
INJECTION, SOLUTION EPIDURAL PRN
Status: DISCONTINUED | OUTPATIENT
Start: 2018-05-22 | End: 2018-12-11 | Stop reason: HOSPADM

## 2018-05-22 RX ORDER — AMOXICILLIN 250 MG
2 CAPSULE ORAL 2 TIMES DAILY
Status: DISCONTINUED | OUTPATIENT
Start: 2018-05-22 | End: 2018-05-24 | Stop reason: HOSPADM

## 2018-05-22 RX ORDER — POTASSIUM CL/LIDO/0.9 % NACL 10MEQ/0.1L
10 INTRAVENOUS SOLUTION, PIGGYBACK (ML) INTRAVENOUS
Status: DISCONTINUED | OUTPATIENT
Start: 2018-05-22 | End: 2018-05-22

## 2018-05-22 RX ORDER — ACETAMINOPHEN 325 MG/1
650 TABLET ORAL EVERY 4 HOURS PRN
Status: DISCONTINUED | OUTPATIENT
Start: 2018-05-22 | End: 2018-05-24 | Stop reason: HOSPADM

## 2018-05-22 RX ORDER — ONDANSETRON 2 MG/ML
4 INJECTION INTRAMUSCULAR; INTRAVENOUS EVERY 6 HOURS PRN
Status: DISCONTINUED | OUTPATIENT
Start: 2018-05-22 | End: 2018-05-22

## 2018-05-22 RX ORDER — MISOPROSTOL 200 UG/1
400 TABLET ORAL
Status: DISCONTINUED | OUTPATIENT
Start: 2018-05-22 | End: 2018-05-24 | Stop reason: HOSPADM

## 2018-05-22 RX ORDER — LIDOCAINE 40 MG/G
CREAM TOPICAL
Status: DISCONTINUED | OUTPATIENT
Start: 2018-05-22 | End: 2018-05-22

## 2018-05-22 RX ORDER — EPHEDRINE SULFATE 50 MG/ML
INJECTION, SOLUTION INTRAMUSCULAR; INTRAVENOUS; SUBCUTANEOUS
Status: DISCONTINUED
Start: 2018-05-22 | End: 2018-05-22 | Stop reason: HOSPADM

## 2018-05-22 RX ORDER — CLINDAMYCIN PHOSPHATE 900 MG/50ML
900 INJECTION, SOLUTION INTRAVENOUS EVERY 8 HOURS
Status: DISCONTINUED | OUTPATIENT
Start: 2018-05-22 | End: 2018-05-22

## 2018-05-22 RX ORDER — OXYTOCIN 10 [USP'U]/ML
10 INJECTION, SOLUTION INTRAMUSCULAR; INTRAVENOUS
Status: DISCONTINUED | OUTPATIENT
Start: 2018-05-22 | End: 2018-05-22

## 2018-05-22 RX ADMIN — OXYTOCIN-SODIUM CHLORIDE 0.9% IV SOLN 30 UNIT/500ML 4 MILLI-UNITS/MIN: 30-0.9/5 SOLUTION at 13:53

## 2018-05-22 RX ADMIN — EPINEPHRINE 0.2 MG: 1 INJECTION, SOLUTION INTRAMUSCULAR; SUBCUTANEOUS at 15:02

## 2018-05-22 RX ADMIN — OXYTOCIN-SODIUM CHLORIDE 0.9% IV SOLN 30 UNIT/500ML 2 MILLI-UNITS/MIN: 30-0.9/5 SOLUTION at 13:00

## 2018-05-22 RX ADMIN — BUPIVACAINE HYDROCHLORIDE IN DEXTROSE 1 ML: 7.5 INJECTION, SOLUTION SUBARACHNOID at 15:02

## 2018-05-22 RX ADMIN — OXYTOCIN-SODIUM CHLORIDE 0.9% IV SOLN 30 UNIT/500ML 340 ML/HR: 30-0.9/5 SOLUTION at 19:01

## 2018-05-22 RX ADMIN — LIDOCAINE HYDROCHLORIDE AND EPINEPHRINE 75 MG: 15; 5 INJECTION, SOLUTION EPIDURAL at 17:19

## 2018-05-22 RX ADMIN — CLINDAMYCIN PHOSPHATE 900 MG: 18 INJECTION, SOLUTION INTRAVENOUS at 12:00

## 2018-05-22 RX ADMIN — Medication 10 MEQ: at 21:51

## 2018-05-22 RX ADMIN — IBUPROFEN 800 MG: 800 TABLET ORAL at 22:08

## 2018-05-22 RX ADMIN — ONDANSETRON 4 MG: 2 INJECTION INTRAMUSCULAR; INTRAVENOUS at 17:42

## 2018-05-22 RX ADMIN — Medication 10 MEQ: at 20:03

## 2018-05-22 RX ADMIN — Medication 10 MEQ: at 16:46

## 2018-05-22 RX ADMIN — SODIUM CHLORIDE, POTASSIUM CHLORIDE, SODIUM LACTATE AND CALCIUM CHLORIDE 1000 ML: 600; 310; 30; 20 INJECTION, SOLUTION INTRAVENOUS at 17:30

## 2018-05-22 RX ADMIN — SODIUM CHLORIDE, POTASSIUM CHLORIDE, SODIUM LACTATE AND CALCIUM CHLORIDE 500 ML: 600; 310; 30; 20 INJECTION, SOLUTION INTRAVENOUS at 12:01

## 2018-05-22 RX ADMIN — Medication 10 MEQ: at 20:41

## 2018-05-22 RX ADMIN — EPHEDRINE SULFATE 5 MG: 50 INJECTION, SOLUTION INTRAVENOUS at 17:43

## 2018-05-22 RX ADMIN — LIDOCAINE HYDROCHLORIDE 50 MG: 10 INJECTION, SOLUTION INFILTRATION; PERINEURAL at 15:00

## 2018-05-22 RX ADMIN — FENTANYL CITRATE 10 ML/HR: 50 INJECTION, SOLUTION INTRAMUSCULAR; INTRAVENOUS at 17:37

## 2018-05-22 RX ADMIN — SENNOSIDES AND DOCUSATE SODIUM 1 TABLET: 8.6; 5 TABLET ORAL at 22:08

## 2018-05-22 ASSESSMENT — LIFESTYLE VARIABLES: TOBACCO_USE: 1

## 2018-05-22 NOTE — PLAN OF CARE
Problem: Labor (Cervical Ripen, Induct, Augment) (Adult,Obstetrics,Pediatric)  Goal: Signs and Symptoms of Listed Potential Problems Will be Absent, Minimized or Managed (Labor)  Signs and symptoms of listed potential problems will be absent, minimized or managed by discharge/transition of care (reference Labor (Cervical Ripen, Induct, Augment) (Adult,Obstetrics,Pediatric) CPG).   Patient's pain increasing and becoming intolerable. Breathing well through contractions. BRENNEN Prado CRNA contacted and epidural is only option for pain management at this point. Discussed with patient and patient would like to proceed with an epidural placement. IVF bolus started.

## 2018-05-22 NOTE — IP AVS SNAPSHOT
[facility]    [address] [address]    Phone:  [phone]    Fax:  [phone]                                       After Visit Summary   5/22/2018    Mora Reardon    MRN: [id number]           After Visit Summary Signature Page     I have received my discharge instructions, and my questions have been answered. I have discussed any challenges I see with this plan with the nurse or doctor.    ..........................................................................................................................................  Patient/Patient Representative Signature      ..........................................................................................................................................  Patient Representative Print Name and Relationship to Patient    ..................................................               ................................................  Date                                            Time    ..........................................................................................................................................  Reviewed by Signature/Title    ...................................................              ..............................................  Date                                                            Time

## 2018-05-22 NOTE — ANESTHESIA PREPROCEDURE EVALUATION
Anesthesia Evaluation       history and physical reviewed .      No history of anesthetic complications          ROS/MED HX    ENT/Pulmonary:     (+)tobacco use, , . .    Neurologic:  - neg neurologic ROS     Cardiovascular:  - neg cardiovascular ROS       METS/Exercise Tolerance:     Hematologic:         Musculoskeletal:         GI/Hepatic:     (+) GERD       Renal/Genitourinary:         Endo:     (+) type I DM, .      Psychiatric:     (+) psychiatric history bipolar      Infectious Disease:         Malignancy:         Other:                     Physical Exam  Normal systems: cardiovascular, pulmonary and dental    Airway   Mallampati: I  TM distance: > 3 FB  Neck ROM: full  Mouth opening: > 3 cm    Dental     Cardiovascular       Pulmonary           neg OB ROS                 Anesthesia Plan      History & Physical Review  History and physical reviewed and following examination; no interval change.    ASA Status:  2 .  OB Epidural Asa: 2       Plan for Epidural          Postoperative Care      Consents  Anesthetic plan, risks, benefits and alternatives discussed with:  Patient and Patient..                          .

## 2018-05-22 NOTE — PLAN OF CARE
Problem: Patient Care Overview  Goal: Plan of Care/Patient Progress Review  Outcome: Improving  Pt had labs, swabs and SVE 1.5/50/-2. Dr Petersen assessed and gave additional orders. All orders completed. Pitocin augmentation started at 2mu/hr at 1300. Pt requests ambulating and frequent motion during early labor. Reports no pain, large amount of clear aminotic fluid from SROM at 1030.  Request ITN when in active labor. CRNA updated with pain management plan.  Will continue to assess and monitor

## 2018-05-22 NOTE — ANESTHESIA PREPROCEDURE EVALUATION
Anesthesia Evaluation     .             ROS/MED HX    ENT/Pulmonary:       Neurologic:  - neg neurologic ROS     Cardiovascular:  - neg cardiovascular ROS       METS/Exercise Tolerance:     Hematologic:         Musculoskeletal:         GI/Hepatic:     (+) GERD       Renal/Genitourinary:         Endo:         Psychiatric:         Infectious Disease:         Malignancy:         Other:                     Physical Exam      Airway   Mallampati: I  TM distance: > 3 FB  Neck ROM: full  Mouth opening: > 3 cm    Dental     Cardiovascular       Pulmonary                     Anesthesia Plan      History & Physical Review  History and physical reviewed and following examination; no interval change.    ASA Status:  2 .  OB Epidural Asa: 2   NPO Status:  > 6 hours    Plan for Spinal          Postoperative Care      Consents  Anesthetic plan, risks, benefits and alternatives discussed with:  Patient..                          .

## 2018-05-22 NOTE — IP AVS SNAPSHOT
MRN:[id number]                      After Visit Summary   5/22/2018    Mora Reardon    MRN: [id number]           Thank you!     Thank you for choosing [facility] for your care. Our goal is always to provide you with excellent care. Hearing back from our patients is one way we can continue to improve our services. Please take a few minutes to complete the written survey that you may receive in the mail after you visit with us. Thank you!        Patient Information     Date Of Birth          1990        About your hospital stay     You were admitted on:  May 22, 2018 You last received care in the:  [facility]    You were discharged on:  May 24, 2018       Who to Call     For medical emergencies, please call 911.  For non-urgent questions about your medical care, please call your primary care provider or clinic, [phone]          Attending Provider     Provider Specialty    Star Mancilla MD OB/Gyn    Angely Petersen MD OB/Gyn       Primary Care Provider Office Phone # Fax #    [facility] [phone]      Further instructions from your care team       Postpartum Vaginal Delivery Instructions         Discharge Instructions and Follow-Up:   Discharge diet: Regular   Discharge activity: Activity as tolerated   Discharge follow-up: Follow up with OB clinic in 6 weeks   Wound care: Drink plenty of fluids  Ice to area for comfort         Activity       Ask family and friends for help when you need it.    Do not place anything in your vagina for 6 weeks.    You are not restricted on other activities, but take it easy for a few weeks to allow your body to recover from delivery.  You are able to do any activities you feel up to that point.    No driving until you have stopped taking your pain medications (usually two weeks after delivery).     Call your health care provider if you have any of these symptoms:       Increased pain,  swelling, redness, or fluid around your stiches from an episiotomy or perineal tear.    A fever above 100.4 F (38 C) with or without chills when placing a thermometer under your tongue.    You soak a sanitary pad with blood within 1 hour, or you see blood clots larger than a golf ball.    Bleeding that lasts more than 6 weeks.    Vaginal discharge that smells bad.    Severe pain, cramping or tenderness in your lower belly area.    A need to urinate more frequently (use the toilet more often), more urgently (use the toilet very quickly), or it burns when you urinate.    Nausea and vomiting.    Redness, swelling or pain around a vein in your leg.    Problems breastfeeding or a red or painful area on your breast.    Chest pain and cough or are gasping for air.    Problems coping with sadness, anxiety, or depression.  If you have any concerns about hurting yourself or the baby, call your provider immediately.     You have questions or concerns after you return home.     Keep your hands clean:  Always wash your hands before touching your perineal area and stitches.  This helps reduce your risk of infection.  If your hands aren't dirty, you may use an alcohol hand-rub to clean your hands. Keep your nails clean and short.     If you have concerns/questions after returning home, contact the nurse triage line [phone] or your primary care clinic.  If you you feel sad, have difficulty sleeping, feel overwhelmed, anxious or have troubling thoughts you may call your clinic, or the HelpLine for Pregnancy & Postpartum Support [place]. ([facility] HelpLine [phone]) or online resource list  @ www.ppsupport[place].org    Calk to schedule appointment for 6 weeks postpartum.         Pending Results     Date and Time Order Name Status Description    5/23/2018 0655 Chlamydia trachomatis PCR In process             Statement of Approval     Ordered          05/24/18 0858  I have reviewed and agree with all the recommendations and orders detailed  "in this document.  EFFECTIVE NOW     Approved and electronically signed by:  Angely Petersen MD             Admission Information     Date & Time Provider Department Dept. Phone    [phone]      Your Vitals Were     Blood Pressure Pulse Temperature Respirations Height Weight    107/56 67 97.7  F (36.5  C) (Oral) 18 1.588 m (5' 2.52\") 86.2 kg (190 lb)    Last Period Pulse Oximetry BMI (Body Mass Index)             (LMP Unknown) 96% 34.18 kg/m2         MyChart Information     A-STAR lets you send messages to your doctor, view your test results, renew your prescriptions, schedule appointments and more. To sign up, go to [email or web address] . Click on \"Log in\" on the left side of the screen, which will take you to the Welcome page. Then click on \"Sign up Now\" on the right side of the page.     You will be asked to enter the access code listed below, as well as some personal information. Please follow the directions to create your username and password.     Your access code is: [email or web address]  Expires: 2018  4:06 PM     Your access code will  in 90 days. If you need help or a new code, please call your [place] clinic or [phone].        Care EveryWhere ID     This is your Care EveryWhere ID. This could be used by other organizations to access your [place] medical records  [id number]        Equal Access to Services     [facility] Hospital[facility] : Hadii jahaira ku hadasho Soomaali, waaxda luqadaha, qaybta kaalmada adeegyada, tawny vega . So [facility] [phone].    ATENCIÓN: Si habla español, tiene a salgado disposición servicios gratuitos de asistencia lingüística. Llame al [phone].    We comply with applicable federal civil rights laws and [place] laws. We do not discriminate on the basis of race, color, national origin, age, disability, sex, sexual orientation, or gender identity.               Review of your medicines    "   START taking        Dose / Directions    acetaminophen 325 MG tablet   Commonly known as:  TYLENOL        Dose:  650 mg   Take 2 tablets (650 mg) by mouth every 4 hours as needed for mild pain or fever (greater than or equal to 38? C /100.4? F (oral) or 38.5? C/ 101.4? F (core).)   Quantity:  100 tablet   Refills:  0       ibuprofen 200 MG tablet   Commonly known as:  ADVIL/MOTRIN        Dose:  800 mg   Take 4 tablets (800 mg) by mouth every 6 hours as needed for other (cramping)   Refills:  0            Where to get your medicines      Some of these will need a paper prescription and others can be bought over the counter. Ask your nurse if you have questions.     You don't need a prescription for these medications     acetaminophen 325 MG tablet    ibuprofen 200 MG tablet                Protect others around you: Learn how to safely use, store and throw away your medicines at www.disposemymeds.org.             Medication List: This is a list of all your medications and when to take them. Check marks below indicate your daily home schedule. Keep this list as a reference.      Medications           Morning Afternoon Evening Bedtime As Needed    acetaminophen 325 MG tablet   Commonly known as:  TYLENOL   Take 2 tablets (650 mg) by mouth every 4 hours as needed for mild pain or fever (greater than or equal to 38? C /100.4? F (oral) or 38.5? C/ 101.4? F (core).)   Last time this was given:  650 mg on 5/24/2018  5:48 AM                                ibuprofen 200 MG tablet   Commonly known as:  ADVIL/MOTRIN   Take 4 tablets (800 mg) by mouth every 6 hours as needed for other (cramping)   Last time this was given:  800 mg on 5/24/2018  8:13 AM            0815

## 2018-05-22 NOTE — PLAN OF CARE
Problem: Labor (Cervical Ripen, Induct, Augment) (Adult,Obstetrics,Pediatric)  Goal: Signs and Symptoms of Listed Potential Problems Will be Absent, Minimized or Managed (Labor)  Signs and symptoms of listed potential problems will be absent, minimized or managed by discharge/transition of care (reference Labor (Cervical Ripen, Induct, Augment) (Adult,Obstetrics,Pediatric) CPG).   Epidural placed and patient's pain rating now 0/10. VS stable. FHT category 1.

## 2018-05-22 NOTE — ANESTHESIA PREPROCEDURE EVALUATION
Anesthesia Evaluation     .             ROS/MED HX    ENT/Pulmonary:       Neurologic:  - neg neurologic ROS     Cardiovascular:  - neg cardiovascular ROS       METS/Exercise Tolerance:     Hematologic:         Musculoskeletal:         GI/Hepatic:     (+) GERD       Renal/Genitourinary:         Endo:         Psychiatric:         Infectious Disease:         Malignancy:         Other:                                    Anesthesia Plan      History & Physical Review  History and physical reviewed and following examination; no interval change.    ASA Status:  2 .        Plan for Epidural     ITN worn off, patient agrees to epidural.      Postoperative Care      Consents  Anesthetic plan, risks, benefits and alternatives discussed with:  Patient..                          .

## 2018-05-22 NOTE — PLAN OF CARE
Problem: Patient Care Overview  Goal: Plan of Care/Patient Progress Review  Outcome: Improving  Mora Reardon  [id number]  37w1d        Mora Reardon presents for labor evaluation. States rachel since on and off last evening. Rates pain at 0/10. The bleeding began denies. Grossly ruptured 1030 clear fluid.     Dr Petersen was notified.     FHT: 130  NST:REACTIVE    Plan:  Sterile vaginal exam      Fetal heart tones assessed and Category 1 tracing noted. Uterine activity assessed and normal uterine activity noted. Interventions included IV fluid flush. Fetal heart tones moderate variability.  . Dr. JANET Petersen notified. Plan is to start abx ASAP for unknown GBS status. Patient informed of and agrees with plan of care.

## 2018-05-22 NOTE — PLAN OF CARE
Problem: Labor (Cervical Ripen, Induct, Augment) (Adult,Obstetrics,Pediatric)  Goal: Signs and Symptoms of Listed Potential Problems Will be Absent, Minimized or Managed (Labor)  Signs and symptoms of listed potential problems will be absent, minimized or managed by discharge/transition of care (reference Labor (Cervical Ripen, Induct, Augment) (Adult,Obstetrics,Pediatric) CPG).  Patient's potassium level 3.3 with lab draw today. MD notified and potassium protocol added to MAR. Per protocol patient to receive 40meq total of IV potassium.

## 2018-05-22 NOTE — ANESTHESIA POSTPROCEDURE EVALUATION
Patient: Mora Reardon    * No procedures listed *    Diagnosis:* No pre-op diagnosis entered *  Diagnosis Additional Information: No value filed.    Anesthesia Type:  Spinal    Note:  Anesthesia Post Evaluation    Patient location during evaluation: Bedside  Patient participation: Able to participate in evaluation but full recovery from regional anesthesia has not yet ocurrred but is anticipated to occur within 48 hours  Level of consciousness: awake and alert  Pain management: adequate  Airway patency: patent  Cardiovascular status: acceptable  Respiratory status: acceptable  Hydration status: acceptable  PONV: none     Anesthetic complications: None          Last vitals:  Vitals:    05/22/18 1538 05/22/18 1539 05/22/18 1543   BP:  108/58    Pulse:      Resp:  16    Temp:      SpO2: 97%  96%         Electronically Signed By: ADELAIDE Tiwari CRNA  May 22, 2018  4:01 PM

## 2018-05-22 NOTE — ANESTHESIA PROCEDURE NOTES
Peripheral nerve/Neuraxial procedure note : epidural catheter  Pre-Procedure  Performed by  FISH BANKS   Location: OB      Pre-Anesthestic Checklist: patient identified, IV checked, risks and benefits discussed, informed consent, monitors and equipment checked, pre-op evaluation and at physician/surgeon's request    Timeout  Correct Patient: Yes   Correct Procedure: Yes   Correct Site: Yes   Correct Laterality: N/A   Correct Position: Yes   Site Marked: N/A   .   Procedure Documentation    .    Procedure:    Epidural catheter.  Insertion Site:L3-4  (midline approach) Injection technique: LORT saline   Local skin infiltrated with mL of 1% lidocaine.       Patient Prep;mask, sterile gloves, patient draped.  .  Needle: Touhy needle Needle Gauge: 17.    Needle Length (Inches) 3.5  # of attempts: 1 and  # of redirects:  1 .   Catheter: 19 G . .  Catheter threaded easily  3 cm epidural space.  9 cm at skin.   .    Assessment/Narrative  Paresthesias: No.  .  .  Aspiration negative for heme or CSF  . Test dose of mL lidocaine 1.5% w/ 1:200,000 epinephrine at. Test dose negative for signs of intravascular, subdural or intrathecal injection. Comments:  VAS pain score prior to epidural:10    VAS pain score after epidural:0    Pt. Tolerated well, FHR stable.
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
## 2018-05-22 NOTE — PLAN OF CARE
Problem: Labor (Cervical Ripen, Induct, Augment) (Adult,Obstetrics,Pediatric)  Goal: Signs and Symptoms of Listed Potential Problems Will be Absent, Minimized or Managed (Labor)  Signs and symptoms of listed potential problems will be absent, minimized or managed by discharge/transition of care (reference Labor (Cervical Ripen, Induct, Augment) (Adult,Obstetrics,Pediatric) CPG).   Patient suddenly nauseated and pale. BP dropped to 91/47. Ephedrine administered x1 and recheck /65 and nausea resolved. FHT category 1 with baseline of 115 and + accels. Contractions occurring every 2-3 minutes and lasting 70-80 seconds.

## 2018-05-22 NOTE — PLAN OF CARE
Problem: Patient Care Overview  Goal: Plan of Care/Patient Progress Review  Outcome: Improving  Pt getting very uncomfortable with contractions, CRNA notified,ITN.SVE 7.5/90/0. Pitocin off, fluid bolus running.

## 2018-05-23 LAB
GP B STREP DNA SPEC QL NAA+PROBE: NEGATIVE
POTASSIUM SERPL-SCNC: 3.2 MMOL/L (ref 3.4–5.3)
POTASSIUM SERPL-SCNC: 3.5 MMOL/L (ref 3.4–5.3)
SPECIMEN SOURCE: NORMAL
T PALLIDUM AB SER QL: NONREACTIVE

## 2018-05-23 PROCEDURE — 25000132 ZZH RX MED GY IP 250 OP 250 PS 637: Performed by: OBSTETRICS & GYNECOLOGY

## 2018-05-23 PROCEDURE — 85461 HEMOGLOBIN FETAL: CPT | Performed by: OBSTETRICS & GYNECOLOGY

## 2018-05-23 PROCEDURE — 12000027 ZZH R&B OB

## 2018-05-23 PROCEDURE — 84132 ASSAY OF SERUM POTASSIUM: CPT | Performed by: OBSTETRICS & GYNECOLOGY

## 2018-05-23 PROCEDURE — 25000128 H RX IP 250 OP 636: Performed by: OBSTETRICS & GYNECOLOGY

## 2018-05-23 PROCEDURE — 86901 BLOOD TYPING SEROLOGIC RH(D): CPT | Performed by: OBSTETRICS & GYNECOLOGY

## 2018-05-23 PROCEDURE — 86900 BLOOD TYPING SEROLOGIC ABO: CPT | Performed by: OBSTETRICS & GYNECOLOGY

## 2018-05-23 PROCEDURE — 87491 CHLMYD TRACH DNA AMP PROBE: CPT | Performed by: OBSTETRICS & GYNECOLOGY

## 2018-05-23 PROCEDURE — 36415 COLL VENOUS BLD VENIPUNCTURE: CPT | Performed by: OBSTETRICS & GYNECOLOGY

## 2018-05-23 RX ADMIN — HUMAN RHO(D) IMMUNE GLOBULIN 300 MCG: 300 INJECTION, SOLUTION INTRAMUSCULAR at 13:09

## 2018-05-23 RX ADMIN — ACETAMINOPHEN 650 MG: 325 TABLET, FILM COATED ORAL at 21:39

## 2018-05-23 RX ADMIN — POTASSIUM CHLORIDE 20 MEQ: 1500 TABLET, EXTENDED RELEASE ORAL at 05:21

## 2018-05-23 RX ADMIN — IBUPROFEN 800 MG: 800 TABLET ORAL at 05:21

## 2018-05-23 RX ADMIN — POTASSIUM CHLORIDE 40 MEQ: 1500 TABLET, EXTENDED RELEASE ORAL at 03:00

## 2018-05-23 RX ADMIN — ACETAMINOPHEN 650 MG: 325 TABLET, FILM COATED ORAL at 17:07

## 2018-05-23 RX ADMIN — SENNOSIDES AND DOCUSATE SODIUM 1 TABLET: 8.6; 5 TABLET ORAL at 07:50

## 2018-05-23 RX ADMIN — SENNOSIDES AND DOCUSATE SODIUM 1 TABLET: 8.6; 5 TABLET ORAL at 19:42

## 2018-05-23 RX ADMIN — IBUPROFEN 800 MG: 800 TABLET ORAL at 13:08

## 2018-05-23 RX ADMIN — IBUPROFEN 800 MG: 800 TABLET ORAL at 19:42

## 2018-05-23 NOTE — PLAN OF CARE
Problem: Labor (Cervical Ripen, Induct, Augment) (Adult,Obstetrics,Pediatric)  Goal: Signs and Symptoms of Listed Potential Problems Will be Absent, Minimized or Managed (Labor)  Signs and symptoms of listed potential problems will be absent, minimized or managed by discharge/transition of care (reference Labor (Cervical Ripen, Induct, Augment) (Adult,Obstetrics,Pediatric) CPG).   S:Delivery  B:Augmented  Labor,37w1d    Lab Results   Component Value Date    GBS  05/10/2017     Negative  No GBS DNA detected, presumed negative for GBS or number of bacteria may be   below the limit of detection of the assay.   Assay performed on incubated broth culture of specimen using [employer] real-time   PCR.      with antibiotic treatment greater than or equal to 4 hours prior to delivery.  A: Patient delivered  none, intact at 1857 with Dr. JANET Petersen in attendance and baby placed on mother's abdomen for delayed cord clamping. Baby dried and stimulated. Baby placed  skin to skin @ 1857.. Apgars 9/9.  IV infusion of Oxytocin  infused. Placenta removal spontaneous. See Flowsheet for VS and PP checks. Labor care plan goals met, transition now to postpartum care.  R: Expect routine postpartum care. Anticipate first feeding within the hour or whenever infant displays feeding cues. Continue skin to skin. Prior discussion with mother indicates that feeding plan is Formula feeding. Educated mother on importance of exclusive breastfeeding, expected feeding readiness cues and encouraged her to observe for these cues while rooming in. Informed her that breastfeeding assistance would be provided.

## 2018-05-23 NOTE — PLAN OF CARE
Problem: Labor (Cervical Ripen, Induct, Augment) (Adult,Obstetrics,Pediatric)  Goal: Signs and Symptoms of Listed Potential Problems Will be Absent, Minimized or Managed (Labor)  Signs and symptoms of listed potential problems will be absent, minimized or managed by discharge/transition of care (reference Labor (Cervical Ripen, Induct, Augment) (Adult,Obstetrics,Pediatric) CPG).   [place] hepatitis B VIS (vaccination information sheet) given to parents.Consent for hepatitis B vaccine given by Mother. Also gave permission for EES and Vit K .

## 2018-05-23 NOTE — PLAN OF CARE
Problem: Postpartum (Vaginal Delivery) (Adult,Obstetrics,Pediatric)  Goal: Signs and Symptoms of Listed Potential Problems Will be Absent, Minimized or Managed (Postpartum)  Signs and symptoms of listed potential problems will be absent, minimized or managed by discharge/transition of care (reference Postpartum (Vaginal Delivery) (Adult,Obstetrics,Pediatric) CPG).   Outcome: Improving  Postpartum assessment WDL. VSS. Pain tolerable with Ibuprofen. Patient caring for self and baby independently. Patient has voided twice- saline lock removed.   Potassium recheck at 0100 3.2. 40 mEq of PO potassium given at 0300. Will give 20 mEq at 0500 and order a potassium lab draw for 0900 per protocol.

## 2018-05-23 NOTE — PLAN OF CARE
Problem: Postpartum (Vaginal Delivery) (Adult,Obstetrics,Pediatric)  Goal: Signs and Symptoms of Listed Potential Problems Will be Absent, Minimized or Managed (Postpartum)  Signs and symptoms of listed potential problems will be absent, minimized or managed by discharge/transition of care (reference Postpartum (Vaginal Delivery) (Adult,Obstetrics,Pediatric) CPG).   Patient received a total of 40meq of IV potassium per protocol. Potassium lab draw ordered for 0100 and 0800. Chlamydia PCR needs to be re collected per the lab at [facility] as the wrong swab was used to obtain specimen. Dr Petersen contacted and states to collect urine specimen for chlamydia. Nurse to collect urine in the morning and call lab to reorder.

## 2018-05-23 NOTE — PLAN OF CARE
Problem: Postpartum (Vaginal Delivery) (Adult,Obstetrics,Pediatric)  Goal: Signs and Symptoms of Listed Potential Problems Will be Absent, Minimized or Managed (Postpartum)  Signs and symptoms of listed potential problems will be absent, minimized or managed by discharge/transition of care (reference Postpartum (Vaginal Delivery) (Adult,Obstetrics,Pediatric) CPG).   Outcome: Improving  Relaxed and bonding well with baby

## 2018-05-23 NOTE — L&D DELIVERY NOTE
27 year old  presented @ 37w1d to BC for SROM.     Stage 1: presented with SROM.  Pitocin augmentation initiated.  Normal labor progress.  ITN for pain relief followed by epidural  Stage 2: pushed over 3 contractions     of viable male, 7#3, Apgars 9/9  Placenta with 3vc  Lacerations: none  Mother and infant stable.    Angely Petersen M.D.    Delivery Summary    Mora Reardon MRN# [id number]   Age: 27 year old YOB: 1990           Labor Event Times    Labor onset date:  18 Onset time:  10:30 AM   Dilation complete date:  18 Complete time:   6:48 PM   Start pushing date/time:  2018 1851            Labor Length    1st Stage (hrs):  8 (min):  18   2nd Stage (hrs):  0 (min):  9   3rd Stage (hrs):  0 (min):  5      Labor Events     labor?:  No    steroids:  Full Course   Labor Type:  Augmentation   Predominate monitoring during 1st stage:  continuous electronic fetal monitoring      Antibiotics received during labor?:  Yes   Reason for Antibiotics:  GBS   Antibiotics received for GBS:  Clindamycin   Antibiotics Given (GBS):  Greater than 4 hours prior to delivery      Rupture identifier:  Rupture 1   Rupture date/time: 18 1030   Rupture type:  Spontaneous rupture of membranes occuring during spontaneous labor or augmentation   Fluid color:  Clear   Fluid odor:  Normal      Augmentation:  Oxytocin   Indications for augmentation:  Ineffective Contraction Pattern   1:1 continuous labor support provided by?:  RN          Delivery/Placenta Date and Time    Delivery Date:  18 Delivery Time:   6:57 PM   Placenta Date/Time:  2018  7:02 PM   Oxytocin given at the time of delivery:  after delivery of baby      Vaginal Counts    Initial count performed by 2 team members:   Two Team Members   Roxana Hwang RN          [employer]   Initial counts 2 0 5    Added to count       Final counts 2 0 5       Placed during  "labor Accounted for at the end of labor   NA NA   NA NA   NA NA      Final count performed by 2 team members:   Two Team Members   Roxana Petersen         Final count correct?:  Yes         Apgars    Living status:  Living    1 Minute 5 Minute 10 Minute 15 Minute 20 Minute   Skin color: 1  1       Heart rate: 2  2       Reflex irritability: 2  2       Muscle tone: 2  2       Respiratory effort: 2  2       Total: 9  9          Apgars assigned by:  RADHA WILKERSON RN      Cord    Vessels:  3 Vessels Complications:  None   Cord Blood Disposition:  Lab Gases Sent?:  No          Resuscitation    Methods:  None      Output in Delivery Room:  Voided      [place] Measurements    Weight:  7 lb 3.3 oz Length:  1' 8.25\"   Head circumference:  34.3 cm       Skin to Skin and Feeding Plan    Skin to skin initiation date/time: 18 1900   Skin to skin with:  Mother   Skin to skin end date/time:     How do you plan to feed your baby:  Formula      Labor Events and Shoulder Dystocia    Fetal Tracing Prior to Delivery:  Category 1   Shoulder dystocia present?:  Neg            Delivery (Maternal) (Provider to Complete) ([id number])    Episiotomy:  None   Perineal lacerations:  None    Vaginal laceration?:  No    Cervical laceration?:  No          Mother's Information  Mother: Mora Reardon #[id number]    Start of Mother's Information     IO Blood Loss  18 1030 - 18 0958    Mom's I/O Activity            End of Mother's Information  Mother: Mora Reardon #[id number]            Delivery - Provider to Complete ([id number])    Delivering clinician:  YAMILA EPTERSEN   Attempted Delivery Types (Choose all that apply):  Spontaneous Vaginal Delivery   Delivery Type (Choose the 1 that will go to the Birth History):  Vaginal, Spontaneous Delivery                           Placenta    Delayed Cord Clamping:  Done   Date/Time:  2018  7:02 PM   Removal:  Spontaneous   Disposition:  Hospital disposal    "   Anesthesia    Method:  Intrathecal         Presentation and Position    Presentation:  Vertex                    Angely Petersen MD

## 2018-05-24 VITALS
SYSTOLIC BLOOD PRESSURE: 107 MMHG | OXYGEN SATURATION: 96 % | HEART RATE: 67 BPM | BODY MASS INDEX: 33.66 KG/M2 | WEIGHT: 190 LBS | DIASTOLIC BLOOD PRESSURE: 56 MMHG | TEMPERATURE: 97.7 F | RESPIRATION RATE: 18 BRPM | HEIGHT: 63 IN

## 2018-05-24 LAB
ABO + RH BLD: NORMAL
ABO + RH BLD: NORMAL
BLOOD BANK CMNT PATIENT-IMP: NORMAL
DATE RH IMM GL GVN: NORMAL
FETAL CELL SCN BLD QL ROSETTE: NORMAL
POTASSIUM SERPL-SCNC: 3.1 MMOL/L (ref 3.4–5.3)
RH IG VIALS RECOM PATIENT: NORMAL

## 2018-05-24 PROCEDURE — 84132 ASSAY OF SERUM POTASSIUM: CPT | Performed by: OBSTETRICS & GYNECOLOGY

## 2018-05-24 PROCEDURE — 36415 COLL VENOUS BLD VENIPUNCTURE: CPT | Performed by: OBSTETRICS & GYNECOLOGY

## 2018-05-24 PROCEDURE — 25000132 ZZH RX MED GY IP 250 OP 250 PS 637: Performed by: OBSTETRICS & GYNECOLOGY

## 2018-05-24 RX ORDER — IBUPROFEN 200 MG
800 TABLET ORAL EVERY 6 HOURS PRN
COMMUNITY
Start: 2018-05-24 | End: 2018-09-18

## 2018-05-24 RX ORDER — ACETAMINOPHEN 325 MG/1
650 TABLET ORAL EVERY 4 HOURS PRN
Qty: 100 TABLET | COMMUNITY
Start: 2018-05-24 | End: 2018-09-18

## 2018-05-24 RX ADMIN — IBUPROFEN 800 MG: 800 TABLET ORAL at 01:32

## 2018-05-24 RX ADMIN — IBUPROFEN 800 MG: 800 TABLET ORAL at 13:17

## 2018-05-24 RX ADMIN — IBUPROFEN 800 MG: 800 TABLET ORAL at 08:13

## 2018-05-24 RX ADMIN — ACETAMINOPHEN 650 MG: 325 TABLET, FILM COATED ORAL at 05:48

## 2018-05-24 RX ADMIN — ACETAMINOPHEN 650 MG: 325 TABLET, FILM COATED ORAL at 01:32

## 2018-05-24 RX ADMIN — SENNOSIDES AND DOCUSATE SODIUM 1 TABLET: 8.6; 5 TABLET ORAL at 08:13

## 2018-05-24 NOTE — DISCHARGE INSTRUCTIONS
Postpartum Vaginal Delivery Instructions         Discharge Instructions and Follow-Up:   Discharge diet: Regular   Discharge activity: Activity as tolerated   Discharge follow-up: Follow up with OB clinic in 6 weeks   Wound care: Drink plenty of fluids  Ice to area for comfort         Activity       Ask family and friends for help when you need it.    Do not place anything in your vagina for 6 weeks.    You are not restricted on other activities, but take it easy for a few weeks to allow your body to recover from delivery.  You are able to do any activities you feel up to that point.    No driving until you have stopped taking your pain medications (usually two weeks after delivery).     Call your health care provider if you have any of these symptoms:       Increased pain, swelling, redness, or fluid around your stiches from an episiotomy or perineal tear.    A fever above 100.4 F (38 C) with or without chills when placing a thermometer under your tongue.    You soak a sanitary pad with blood within 1 hour, or you see blood clots larger than a golf ball.    Bleeding that lasts more than 6 weeks.    Vaginal discharge that smells bad.    Severe pain, cramping or tenderness in your lower belly area.    A need to urinate more frequently (use the toilet more often), more urgently (use the toilet very quickly), or it burns when you urinate.    Nausea and vomiting.    Redness, swelling or pain around a vein in your leg.    Problems breastfeeding or a red or painful area on your breast.    Chest pain and cough or are gasping for air.    Problems coping with sadness, anxiety, or depression.  If you have any concerns about hurting yourself or the baby, call your provider immediately.     You have questions or concerns after you return home.     Keep your hands clean:  Always wash your hands before touching your perineal area and stitches.  This helps reduce your risk of infection.  If your hands aren't dirty, you may use an  alcohol hand-rub to clean your hands. Keep your nails clean and short.     If you have concerns/questions after returning home, contact the nurse triage line [phone] or your primary care clinic.  If you you feel sad, have difficulty sleeping, feel overwhelmed, anxious or have troubling thoughts you may call your clinic, or the HelpLine for Pregnancy & Postpartum Support [place]. ([facility] HelpLine 504-272-3750) or online resource list  @ www.ppsupport[place].org    Vicky to schedule appointment for 6 weeks postpartum.

## 2018-05-24 NOTE — PLAN OF CARE
Problem: Postpartum (Vaginal Delivery) (Adult,Obstetrics,Pediatric)  Goal: Signs and Symptoms of Listed Potential Problems Will be Absent, Minimized or Managed (Postpartum)  Signs and symptoms of listed potential problems will be absent, minimized or managed by discharge/transition of care (reference Postpartum (Vaginal Delivery) (Adult,Obstetrics,Pediatric) CPG).   Outcome: Adequate for Discharge Date Met: 05/24/18    Data: Vital signs within normal limits. Postpartum checks within normal limits - see flow record. Patient eating and drinking normally. Patient able to empty bladder independently and is up ambulating. No apparent signs of infection. Perineum healing well. Patient performing self cares and is able to care for infant.  Action: Patient medicated during the shift for pain. See MAR. Patient reassessed within 1 hour after each medication and pain was improved - patient stated she was comfortable. Patient education done about mother and baby cares. See flow record.  Response: Positive attachment behaviors observed with infant.   Plan: Anticipate discharge today.  Patient discharged per ambulatory with infant in car seat. Mother verified that her band matches her infant's band by comparing the infant's  MR#.  Discharge instructions given. Encouraged to call for any problems, questions or concerns.

## 2018-05-24 NOTE — DISCHARGE SUMMARY
[facility] Discharge Summary    Mora Reardon MRN# [id number]   Age: 27 year old YOB: 1990     Date of Admission:  2018  Date of Discharge::  2018  Admitting Physician:  Angely Petersen MD  Discharge Physician:  Angley Petersen MD     Home clinic: [facility]          Admission Diagnoses:   Intrauterine pregnancy at 37w1d  weeks gestation          Discharge Diagnosis:   Normal spontaneous vaginal delivery  Intrauterine pregnancy at 37w1d  weeks gestation          Procedures:   Procedure(s): No additional procedures performed       No other procedures performed during this admission           Medications Prior to Admission:     No prescriptions prior to admission.             Discharge Medications:     Current Discharge Medication List      START taking these medications    Details   acetaminophen (TYLENOL) 325 MG tablet Take 2 tablets (650 mg) by mouth every 4 hours as needed for mild pain or fever (greater than or equal to 38  C /100.4  F (oral) or 38.5  C/ 101.4  F (core).)  Qty: 100 tablet    Associated Diagnoses: Vaginal delivery      ibuprofen (ADVIL/MOTRIN) 200 MG tablet Take 4 tablets (800 mg) by mouth every 6 hours as needed for other (cramping)    Associated Diagnoses: Vaginal delivery                   Consultations:   No consultations were requested during this admission          Brief History of Labor:   27 year old  presented @ 37w1d to BC for SROM.      Stage 1: presented with SROM.  Pitocin augmentation initiated.  Normal labor progress.  ITN for pain relief followed by epidural  Stage 2: pushed over 3 contractions      of viable male, 7#3, Apgars 9/9  Placenta with 3vc  Lacerations: none  Mother and infant stable.           Hospital Course:   The patient's hospital course was unremarkable.  On discharge, her pain was well controlled. Vaginal bleeding is similar to peak menstrual flow.  Voiding without difficulty.  Ambulating well and  "tolerating a normal diet.  No fever.  Infant is stable.  No bowel movement yet.*  She was discharged on post-partum day #2.    PE: /56  Pulse 67  Temp 97.7  F (36.5  C) (Oral)  Resp 18  Ht 5' 2.52\" (1.588 m)  Wt 190 lb (86.2 kg)  LMP  (LMP Unknown)  SpO2 96%  Breastfeeding? Unknown  BMI 34.18 kg/m2   NAD  Abd: soft, fundus firm  Ext: calves nontender    Post-partum hemoglobin:   Hemoglobin   Date Value Ref Range Status   05/22/2018 12.5 11.7 - 15.7 g/dL Final             Discharge Instructions and Follow-Up:   Discharge diet: Regular   Discharge activity: Activity as tolerated   Discharge follow-up: Follow up with OB clinic in 6 weeks   Wound care: Drink plenty of fluids  Ice to area for comfort           Discharge Disposition:   Discharged to home      Attestation:  I have reviewed today's vital signs, notes, medications, labs and imaging.    Angely Petersen MD     "

## 2018-05-24 NOTE — PLAN OF CARE
Problem: Postpartum (Vaginal Delivery) (Adult,Obstetrics,Pediatric)  Goal: Signs and Symptoms of Listed Potential Problems Will be Absent, Minimized or Managed (Postpartum)  Signs and symptoms of listed potential problems will be absent, minimized or managed by discharge/transition of care (reference Postpartum (Vaginal Delivery) (Adult,Obstetrics,Pediatric) CPG).   Outcome: Improving  Data: Vital signs within normal limits. Postpartum checks within normal limits - see flow record. Patient eating and drinking normally. Patient able to empty bladder independently. Patient ambulating independently. No apparent signs of infection. Perineum healing well. Patient is performing self cares and is able to care for infant. Positive attachment behaviors are observed with infant. Support persons are not  present.  Action:  Pain plan was discussed. Patient would like pain meds to be brought in when they are due. Patient was medicated during the shift for pain. See MAR.Patient education done about formula feeding,  cares, postpartum cares, pain management/plan, fall risk and rest. See flow record.  Response:   Patient reassessed within 1 hour after each medication for pain. Patient stated that pain had improved. Patient stated that she was comfortable. .   Plan: Anticipate discharge on 18.

## 2018-05-25 DIAGNOSIS — Z11.8 SPECIAL SCREENING EXAMINATION FOR CHLAMYDIAL DISEASE: Primary | ICD-10-CM

## 2018-05-25 LAB
C TRACH DNA SPEC QL NAA+PROBE: POSITIVE
SPECIMEN SOURCE: ABNORMAL

## 2018-05-25 RX ORDER — AZITHROMYCIN 500 MG/1
1000 TABLET, FILM COATED ORAL ONCE
Qty: 4 TABLET | Refills: 0 | Status: SHIPPED | OUTPATIENT
Start: 2018-05-25 | End: 2018-05-25

## 2018-07-05 NOTE — NURSING NOTE
"Initial /71 (BP Location: Left arm, Patient Position: Chair, Cuff Size: Adult Large)  Pulse 87  Ht 5' 1.5\" (1.562 m)  Wt 214 lb (97.1 kg)  LMP  (LMP Unknown)  BMI 39.78 kg/m2 Estimated body mass index is 39.78 kg/(m^2) as calculated from the following:    Height as of this encounter: 5' 1.5\" (1.562 m).    Weight as of this encounter: 214 lb (97.1 kg). .      "
Yes:

## 2018-09-18 ENCOUNTER — OFFICE VISIT (OUTPATIENT)
Dept: FAMILY MEDICINE | Facility: CLINIC | Age: 28
End: 2018-09-18

## 2018-09-18 VITALS — WEIGHT: 148 LBS | HEIGHT: 63 IN | BODY MASS INDEX: 26.22 KG/M2

## 2018-09-18 DIAGNOSIS — Z11.3 ROUTINE SCREENING FOR STI (SEXUALLY TRANSMITTED INFECTION): ICD-10-CM

## 2018-09-18 DIAGNOSIS — Z32.01 PREGNANCY EXAMINATION OR TEST, POSITIVE RESULT: Primary | ICD-10-CM

## 2018-09-18 DIAGNOSIS — N91.2 AMENORRHEA: ICD-10-CM

## 2018-09-18 LAB — BETA HCG QUAL IFA URINE: POSITIVE

## 2018-09-18 PROCEDURE — 99213 OFFICE O/P EST LOW 20 MIN: CPT | Performed by: NURSE PRACTITIONER

## 2018-09-18 PROCEDURE — 87491 CHLMYD TRACH DNA AMP PROBE: CPT | Performed by: NURSE PRACTITIONER

## 2018-09-18 PROCEDURE — 84703 CHORIONIC GONADOTROPIN ASSAY: CPT | Performed by: NURSE PRACTITIONER

## 2018-09-18 NOTE — MR AVS SNAPSHOT
"              After Visit Summary   9/18/2018    Mora Reardon    MRN: [id number]           Patient Information     Date Of Birth          1990        Visit Information        Provider Department      9/18/2018 2:40 PM Norma Montes APRN CNP [facility]        Today's Diagnoses     Routine screening for STI (sexually transmitted infection)    -  1    Amenorrhea        Pregnancy examination or test, positive result          Care Instructions    Schedule with OB/GYN  [facility] [phone]-Dr. Mckinney is in [place] on Monday's    Please call [phone] to schedule your ultrasound    Labs today-we will notify you with those results          Follow-ups after your visit        Future tests that were ordered for you today     Open Future Orders        Priority Expected Expires Ordered    US OB < 14 Weeks Single Routine  9/18/2019 9/18/2018            Who to contact     If you have questions or need follow up information about today's clinic visit or your schedule please contact [facility] directly at [phone].  Normal or non-critical lab and imaging results will be communicated to you by MyChart, letter or phone within 4 business days after the clinic has received the results. If you do not hear from us within 7 days, please contact the clinic through MyChart or phone. If you have a critical or abnormal lab result, we will notify you by phone as soon as possible.  Submit refill requests through [employer] or call your pharmacy and they will forward the refill request to us. Please allow 3 business days for your refill to be completed.          Additional Information About Your Visit        Care EveryWhere ID     This is your Care EveryWhere ID. This could be used by other organizations to access your [facility] medical records  [id number]        Your Vitals Were     Height Last Period BMI (Body Mass Index)             5' 2.5\" (1.588 " m) (LMP Unknown) 26.64 kg/m2          Blood Pressure from Last 3 Encounters:   09/18/18 (P) 120/68   05/23/18 107/56   04/24/18 129/65    Weight from Last 3 Encounters:   09/18/18 148 lb (67.1 kg)   05/22/18 190 lb (86.2 kg)   04/24/18 184 lb (83.5 kg)              We Performed the Following     Beta HCG Qual, Urine - [facility] [place] ([id number])     Chlamydia trachomatis PCR          Today's Medication Changes          These changes are accurate as of 9/18/18  3:29 PM.  If you have any questions, ask your nurse or doctor.               Stop taking these medicines if you haven't already. Please contact your care team if you have questions.     acetaminophen 325 MG tablet   Commonly known as:  TYLENOL   Stopped by:  Norma Montes APRN CNP           ibuprofen 200 MG tablet   Commonly known as:  ADVIL/MOTRIN   Stopped by:  Norma Montes APRN CNP                    Primary Care Provider Office Phone # Fax #    ADELAIDE White [phone] [address] [place] [address]        Equal Access to Services     [facility] : Hadii jahaira preciado hadasho Sobrianali, waaxda luqadaha, qaybta kaalmada adeegyada, tawny griggs. So [facility] [phone].    ATENCIÓN: Si habla español, tiene a salgado disposición servicios gratuitos de asistencia lingüística. Llame al [phone].    We comply with applicable federal civil rights laws and [place] laws. We do not discriminate on the basis of race, color, national origin, age, disability, sex, sexual orientation, or gender identity.            Thank you!     Thank you for choosing [facility]  for your care. Our goal is always to provide you with excellent care. Hearing back from our patients is one way we can continue to improve our services. Please take a few minutes to complete the written survey that you may receive in the mail after your visit with us. Thank you!             Your Updated  Medication List - Protect others around you: Learn how to safely use, store and throw away your medicines at www.disposemymeds.org.      Notice  As of 9/18/2018  3:29 PM    You have not been prescribed any medications.

## 2018-09-18 NOTE — PROGRESS NOTES
SUBJECTIVE:   Mora Reardon is a 27 year old female who presents to clinic today for the following health issues:    Pregnancy Confirmation - Pt states she took a few pregnancy test at home that were positive. Pt feels she is about 2 months along.     No menses since birth of child-sexually active 3 weeks following birth  Thinks she is probably 2 months pregnant  History of positive chlamydia in last pregnancy-she and partner were treated the second time    Problem list and histories reviewed & adjusted, as indicated.  Additional history: as documented    Patient Active Problem List   Diagnosis     Bipolar disorder (H)     Constipation     Bartter's syndrome     Patient non-compliance     CTS (carpal tunnel syndrome)     GERD (gastroesophageal reflux disease)     CARDIOVASCULAR SCREENING; LDL GOAL LESS THAN 160     Prenatal care, subsequent pregnancy     Abnormal maternal glucose tolerance, antepartum     Normal labor     Vaginal delivery     Past Surgical History:   Procedure Laterality Date     TOE SURGERY      removal of glass       Social History   Substance Use Topics     Smoking status: Former Smoker     Packs/day: 0.50     Years: 6.00     Types: Cigarettes     Smokeless tobacco: Never Used      Comment: smoking 4-5 cig/day with pregnancy     Alcohol use 0.0 oz/week     0 Standard drinks or equivalent per week      Comment: rare- quit with pregnancy     Family History   Problem Relation Age of Onset     Diabetes Mother      Thyroid Disease Mother      Depression Mother      Hypertension Paternal Grandmother      Cancer - colorectal Paternal Grandmother      colon     Cancer Paternal Grandfather      tongue cancer, not smoker     Genetic Disorder Sister      Bartter Syndrome         No current outpatient prescriptions on file.     Allergies   Allergen Reactions     Keflex [Cephalexin Monohydrate] Hives and Rash     Labs reviewed in EPIC    Reviewed and updated as needed this visit by clinical  "staff  Tobacco  Allergies  Med Hx  Surg Hx  Fam Hx  Soc Hx      Reviewed and updated as needed this visit by Provider         ROS:  Constitutional, HEENT, cardiovascular, pulmonary, gi and gu systems are negative, except as otherwise noted.    OBJECTIVE:     BP (P) 120/68 (Cuff Size: Adult Regular)  Pulse (P) 64  Resp (P) 18  Ht 5' 2.5\" (1.588 m)  Wt 148 lb (67.1 kg)  LMP  (LMP Unknown)  BMI 26.64 kg/m2  Body mass index is 26.64 kg/(m^2).  GENERAL: healthy, alert and no distress  PSYCH: mentation appears normal, affect normal/bright    Diagnostic Test Results:  Results for orders placed or performed in visit on 09/18/18 (from the past 24 hour(s))   Beta HCG Qual, Urine - [facility] [place] ([id number])   Result Value Ref Range    Beta HCG Qual IFA Urine Positive (A) NEG^Negative          ASSESSMENT/PLAN:     1. Pregnancy examination or test, positive result  Positive pregnancy test in clinic.  Dating ultrasound ordered.  Recommend establishing care with OB/GYN for prenatal care.  - US OB < 14 Weeks Single; Future    2. Amenorrhea    - Beta HCG Qual, Urine - [facility] [place] ([id number])    3. Routine screening for STI (sexually transmitted infection)  Test for cure with recent chlamydia infection.  - Chlamydia trachomatis PCR    Home care instructions were reviewed with the patient. The risks, benefits and treatment options of prescribed medications or other treatments have been discussed with the patient. The patient verbalized their understanding and should call or follow up if no improvement or if they develop further problems.      Patient Instructions   Schedule with OB/GYN  [facility] [phone]-Dr. Mckinney is in [place] on Monday's    Please call [phone] to schedule your ultrasound    Labs today-we will notify you with those results      ADELAIDE [facility]  "

## 2018-09-18 NOTE — PATIENT INSTRUCTIONS
Schedule with OB/GYN  [facility] [phone]-Dr. Mckinney is in [place] on Monday's    Please call [phone] to schedule your ultrasound    Labs today-we will notify you with those results

## 2018-09-19 LAB
C TRACH DNA SPEC QL NAA+PROBE: NEGATIVE
SPECIMEN SOURCE: NORMAL

## 2018-10-09 ENCOUNTER — HOSPITAL ENCOUNTER (OUTPATIENT)
Dept: ULTRASOUND IMAGING | Facility: CLINIC | Age: 28
Discharge: HOME | End: 2018-10-09
Attending: NURSE PRACTITIONER | Admitting: NURSE PRACTITIONER

## 2018-10-09 DIAGNOSIS — Z32.01 PREGNANCY EXAMINATION OR TEST, POSITIVE RESULT: ICD-10-CM

## 2018-10-09 PROCEDURE — 76805 OB US >/= 14 WKS SNGL FETUS: CPT

## 2018-10-16 ENCOUNTER — APPOINTMENT (OUTPATIENT)
Dept: OBGYN | Facility: CLINIC | Age: 28
End: 2018-10-16

## 2018-10-17 ENCOUNTER — PRENATAL OFFICE VISIT (OUTPATIENT)
Dept: OBGYN | Facility: CLINIC | Age: 28
End: 2018-10-17

## 2018-10-17 DIAGNOSIS — Z34.80 PRENATAL CARE, SUBSEQUENT PREGNANCY: Primary | ICD-10-CM

## 2018-10-17 PROCEDURE — 99207 ZZC NO CHARGE NURSE ONLY: CPT | Performed by: OBSTETRICS & GYNECOLOGY

## 2018-10-17 RX ORDER — PRENATAL VIT/IRON FUM/FOLIC AC 27MG-0.8MG
1 TABLET ORAL DAILY
COMMUNITY
Start: 2018-10-09 | End: 2021-02-10

## 2018-10-17 NOTE — MR AVS SNAPSHOT
After Visit Summary   10/17/2018    Mora Reardon    MRN: [id number]           Patient Information     Date Of Birth          1990        Visit Information        Provider Department      10/17/2018 1:53 PM Katey Mckinney MD [facility]        Today's Diagnoses     Prenatal care, subsequent pregnancy    -  1       Follow-ups after your visit        Your next 10 appointments already scheduled     Nov 05, 2018  4:00 PM CST   New Prenatal with Katey Mckinney MD   [facility] ([facility])    [address] [place] [id number] [phone]              Who to contact     If you have questions or need follow up information about today's clinic visit or your schedule please contact [facility] directly at [phone].  Normal or non-critical lab and imaging results will be communicated to you by MyChart, letter or phone within 4 business days after the clinic has received the results. If you do not hear from us within 7 days, please contact the clinic through MyChart or phone. If you have a critical or abnormal lab result, we will notify you by phone as soon as possible.  Submit refill requests through [employer] or call your pharmacy and they will forward the refill request to us. Please allow 3 business days for your refill to be completed.          Additional Information About Your Visit        Care EveryWhere ID     This is your Care EveryWhere ID. This could be used by other organizations to access your [facility] medical records  [id number]        Your Vitals Were     Last Period                   (LMP Unknown)            Blood Pressure from Last 3 Encounters:   09/18/18 (P) 120/68   05/23/18 107/56   04/24/18 129/65    Weight from Last 3 Encounters:   09/18/18 67.1 kg (148 lb)   05/22/18 86.2 kg (190 lb)   04/24/18 83.5 kg (184 lb)              Today, you had the following     No orders found for display       Primary  Care Provider Office Phone # Fax #    ADELAIDE White [phone] [address] [place] [address]        Equal Access to Services     RODGER LEWIS : Hadii aad ku hadmilao Sobrianali, waaxda luqadaha, qaybta kaalmada denia, tawny ellis demetraderrell murdock silvia griggs. So [facility] [phone].    ATENCIÓN: Si habla español, tiene a salgado disposición servicios gratuitos de asistencia lingüística. Llame al [phone].    We comply with applicable federal civil rights laws and [place] laws. We do not discriminate on the basis of race, color, national origin, age, disability, sex, sexual orientation, or gender identity.            Thank you!     Thank you for choosing [facility]  for your care. Our goal is always to provide you with excellent care. Hearing back from our patients is one way we can continue to improve our services. Please take a few minutes to complete the written survey that you may receive in the mail after your visit with us. Thank you!             Your Updated Medication List - Protect others around you: Learn how to safely use, store and throw away your medicines at www.disposemymeds.org.          This list is accurate as of 10/17/18  2:12 PM.  Always use your most recent med list.                   Brand Name Dispense Instructions for use Diagnosis    prenatal multivitamin plus iron 27-0.8 MG Tabs per tablet      Take 1 tablet by mouth daily

## 2018-11-05 ENCOUNTER — PRENATAL OFFICE VISIT (OUTPATIENT)
Dept: OBGYN | Facility: CLINIC | Age: 28
End: 2018-11-05

## 2018-11-05 VITALS
DIASTOLIC BLOOD PRESSURE: 65 MMHG | BODY MASS INDEX: 29.34 KG/M2 | HEART RATE: 108 BPM | WEIGHT: 163 LBS | SYSTOLIC BLOOD PRESSURE: 113 MMHG | TEMPERATURE: 97.6 F

## 2018-11-05 DIAGNOSIS — Z34.82 PRENATAL CARE, SUBSEQUENT PREGNANCY IN SECOND TRIMESTER: Primary | ICD-10-CM

## 2018-11-05 DIAGNOSIS — E87.6 HYPOKALEMIA: ICD-10-CM

## 2018-11-05 DIAGNOSIS — E87.6 HYPOPOTASSEMIA: ICD-10-CM

## 2018-11-05 PROBLEM — O99.810 ABNORMAL MATERNAL GLUCOSE TOLERANCE, ANTEPARTUM: Status: RESOLVED | Noted: 2017-03-03 | Resolved: 2018-11-05

## 2018-11-05 PROBLEM — Z37.9 NORMAL LABOR: Status: RESOLVED | Noted: 2018-05-22 | Resolved: 2018-11-05

## 2018-11-05 LAB
ALBUMIN UR-MCNC: NEGATIVE MG/DL
ANION GAP SERPL CALCULATED.3IONS-SCNC: 12 MMOL/L (ref 3–14)
APPEARANCE UR: CLEAR
BACTERIA #/AREA URNS HPF: ABNORMAL /HPF
BILIRUB UR QL STRIP: NEGATIVE
BUN SERPL-MCNC: 13 MG/DL (ref 7–30)
CALCIUM SERPL-MCNC: 8.3 MG/DL (ref 8.5–10.1)
CHLORIDE SERPL-SCNC: 100 MMOL/L (ref 94–109)
CO2 SERPL-SCNC: 26 MMOL/L (ref 20–32)
COLOR UR AUTO: YELLOW
CREAT SERPL-MCNC: 0.66 MG/DL (ref 0.52–1.04)
ERYTHROCYTE [DISTWIDTH] IN BLOOD BY AUTOMATED COUNT: 13.2 % (ref 10–15)
GFR SERPL CREATININE-BSD FRML MDRD: >90 ML/MIN/1.7M2
GLUCOSE SERPL-MCNC: 110 MG/DL (ref 70–99)
GLUCOSE UR STRIP-MCNC: NEGATIVE MG/DL
HCT VFR BLD AUTO: 38.1 % (ref 35–47)
HGB BLD-MCNC: 13.3 G/DL (ref 11.7–15.7)
HGB UR QL STRIP: ABNORMAL
KETONES UR STRIP-MCNC: NEGATIVE MG/DL
LEUKOCYTE ESTERASE UR QL STRIP: ABNORMAL
MCH RBC QN AUTO: 31.7 PG (ref 26.5–33)
MCHC RBC AUTO-ENTMCNC: 34.9 G/DL (ref 31.5–36.5)
MCV RBC AUTO: 91 FL (ref 78–100)
NITRATE UR QL: NEGATIVE
NON-SQ EPI CELLS #/AREA URNS LPF: ABNORMAL /LPF
PH UR STRIP: 7 PH (ref 5–7)
PLATELET # BLD AUTO: 249 10E9/L (ref 150–450)
POTASSIUM SERPL-SCNC: 2.6 MMOL/L (ref 3.4–5.3)
RBC # BLD AUTO: 4.19 10E12/L (ref 3.8–5.2)
RBC #/AREA URNS AUTO: ABNORMAL /HPF
SODIUM SERPL-SCNC: 138 MMOL/L (ref 133–144)
SOURCE: ABNORMAL
SP GR UR STRIP: 1.02 (ref 1–1.03)
UROBILINOGEN UR STRIP-ACNC: 0.2 EU/DL (ref 0.2–1)
WBC # BLD AUTO: 13.1 10E9/L (ref 4–11)
WBC #/AREA URNS AUTO: ABNORMAL /HPF

## 2018-11-05 PROCEDURE — 86850 RBC ANTIBODY SCREEN: CPT | Performed by: OBSTETRICS & GYNECOLOGY

## 2018-11-05 PROCEDURE — 80048 BASIC METABOLIC PNL TOTAL CA: CPT | Performed by: OBSTETRICS & GYNECOLOGY

## 2018-11-05 PROCEDURE — 87340 HEPATITIS B SURFACE AG IA: CPT | Performed by: OBSTETRICS & GYNECOLOGY

## 2018-11-05 PROCEDURE — 81001 URINALYSIS AUTO W/SCOPE: CPT | Performed by: OBSTETRICS & GYNECOLOGY

## 2018-11-05 PROCEDURE — 86900 BLOOD TYPING SEROLOGIC ABO: CPT | Performed by: OBSTETRICS & GYNECOLOGY

## 2018-11-05 PROCEDURE — 87086 URINE CULTURE/COLONY COUNT: CPT | Performed by: OBSTETRICS & GYNECOLOGY

## 2018-11-05 PROCEDURE — 99207 ZZC FIRST OB VISIT: CPT | Performed by: OBSTETRICS & GYNECOLOGY

## 2018-11-05 PROCEDURE — 86780 TREPONEMA PALLIDUM: CPT | Performed by: OBSTETRICS & GYNECOLOGY

## 2018-11-05 PROCEDURE — 86901 BLOOD TYPING SEROLOGIC RH(D): CPT | Performed by: OBSTETRICS & GYNECOLOGY

## 2018-11-05 PROCEDURE — 87389 HIV-1 AG W/HIV-1&-2 AB AG IA: CPT | Performed by: OBSTETRICS & GYNECOLOGY

## 2018-11-05 PROCEDURE — 80306 DRUG TEST PRSMV INSTRMNT: CPT | Performed by: OBSTETRICS & GYNECOLOGY

## 2018-11-05 PROCEDURE — 85027 COMPLETE CBC AUTOMATED: CPT | Performed by: OBSTETRICS & GYNECOLOGY

## 2018-11-05 PROCEDURE — 36415 COLL VENOUS BLD VENIPUNCTURE: CPT | Performed by: OBSTETRICS & GYNECOLOGY

## 2018-11-05 PROCEDURE — 86762 RUBELLA ANTIBODY: CPT | Performed by: OBSTETRICS & GYNECOLOGY
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
## 2018-11-05 NOTE — NURSING NOTE
"Initial /65 (BP Location: Right arm, Patient Position: Chair, Cuff Size: Adult Large)  Pulse 108  Temp 97.6  F (36.4  C) (Tympanic)  Wt 163 lb (73.9 kg)  LMP  (LMP Unknown)  Breastfeeding? No  BMI 29.34 kg/m2 Estimated body mass index is 29.34 kg/(m^2) as calculated from the following:    Height as of 9/18/18: 5' 2.5\" (1.588 m).    Weight as of this encounter: 163 lb (73.9 kg). .    Anai Hendrix    "

## 2018-11-05 NOTE — MR AVS SNAPSHOT
After Visit Summary   11/5/2018    Mora Reardon    MRN: [id number]           Patient Information     Date Of Birth          1990        Visit Information        Provider Department      11/5/2018 4:00 PM Katey Mckinney MD [facility]        Today's Diagnoses     Prenatal care, subsequent pregnancy in second trimester    -  1    Bartter's syndrome           Follow-ups after your visit        Who to contact     If you have questions or need follow up information about today's clinic visit or your schedule please contact [facility] directly at [phone].  Normal or non-critical lab and imaging results will be communicated to you by MyChart, letter or phone within 4 business days after the clinic has received the results. If you do not hear from us within 7 days, please contact the clinic through MyChart or phone. If you have a critical or abnormal lab result, we will notify you by phone as soon as possible.  Submit refill requests through [employer] or call your pharmacy and they will forward the refill request to us. Please allow 3 business days for your refill to be completed.          Additional Information About Your Visit        Care EveryWhere ID     This is your Care EveryWhere ID. This could be used by other organizations to access your [facility] medical records  [id number]        Your Vitals Were     Pulse Temperature Last Period Breastfeeding? BMI (Body Mass Index)       108 97.6  F (36.4  C) (Tympanic) (LMP Unknown) No 29.34 kg/m2        Blood Pressure from Last 3 Encounters:   11/05/18 113/65   09/18/18 (P) 120/68   05/23/18 107/56    Weight from Last 3 Encounters:   11/05/18 163 lb (73.9 kg)   09/18/18 148 lb (67.1 kg)   05/22/18 190 lb (86.2 kg)              We Performed the Following     *UA reflex to Microscopic     ABO/Rh type and screen     Basic metabolic panel     CBC with platelets     Hepatitis B surface antigen     HIV Antigen Antibody Combo      Rubella Antibody IgG Quantitative     Treponema Abs w Reflex to RPR and Titer     Urine Culture Aerobic Bacterial     Urine Drugs of Abuse Screen Panel 13      OB <14 Weeks w Transvaginal Single        Primary Care Provider Office Phone # Fax #    ADELAIDE White [phone] [address] [facility] [address]        Equal Access to Services     [facility]: Hadii aad ku hadasho Soomaali, waaxda luqadaha, qaybta kaalmada adeegyada, waxay idiin hayaan adeeg kharash la'aan . So [facility] [phone].    ATENCIÓN: Si habla español, tiene a salgado disposición servicios gratuitos de asistencia lingüística. Llame al [phone].    We comply with applicable federal civil rights laws and [place] laws. We do not discriminate on the basis of race, color, national origin, age, disability, sex, sexual orientation, or gender identity.            Thank you!     Thank you for choosing [facility]  for your care. Our goal is always to provide you with excellent care. Hearing back from our patients is one way we can continue to improve our services. Please take a few minutes to complete the written survey that you may receive in the mail after your visit with us. Thank you!             Your Updated Medication List - Protect others around you: Learn how to safely use, store and throw away your medicines at www.disposemymeds.org.          This list is accurate as of 11/5/18  4:17 PM.  Always use your most recent med list.                   Brand Name Dispense Instructions for use Diagnosis    prenatal multivitamin plus iron 27-0.8 MG Tabs per tablet      Take 1 tablet by mouth daily

## 2018-11-06 PROBLEM — O26.899 RH NEGATIVE, ANTEPARTUM: Status: ACTIVE | Noted: 2018-11-06

## 2018-11-06 PROBLEM — Z67.91 RH NEGATIVE, ANTEPARTUM: Status: ACTIVE | Noted: 2018-11-06

## 2018-11-06 LAB
ABO + RH BLD: NORMAL
ABO + RH BLD: NORMAL
AMPHETAMINES UR QL: NOT DETECTED NG/ML
BACTERIA SPEC CULT: NORMAL
BARBITURATES UR QL SCN: NOT DETECTED NG/ML
BENZODIAZ UR QL SCN: NOT DETECTED NG/ML
BLD GP AB SCN SERPL QL: NORMAL
BLOOD BANK CMNT PATIENT-IMP: NORMAL
BUPRENORPHINE UR QL: NOT DETECTED NG/ML
CANNABINOIDS UR QL: ABNORMAL NG/ML
COCAINE UR QL SCN: NOT DETECTED NG/ML
D-METHAMPHET UR QL: NOT DETECTED NG/ML
HBV SURFACE AG SERPL QL IA: NONREACTIVE
HIV 1+2 AB+HIV1 P24 AG SERPL QL IA: NONREACTIVE
Lab: NORMAL
METHADONE UR QL SCN: NOT DETECTED NG/ML
OPIATES UR QL SCN: NOT DETECTED NG/ML
OXYCODONE UR QL SCN: NOT DETECTED NG/ML
PCP UR QL SCN: NOT DETECTED NG/ML
PROPOXYPH UR QL: NOT DETECTED NG/ML
RUBV IGG SERPL IA-ACNC: 25 IU/ML
SPECIMEN EXP DATE BLD: NORMAL
SPECIMEN SOURCE: NORMAL
T PALLIDUM AB SER QL: NONREACTIVE
TRICYCLICS UR QL SCN: NOT DETECTED NG/ML

## 2018-11-06 NOTE — PROGRESS NOTES
Pt notified of below.  Pt reports understanding.  Pt concerned as she does not have the financial means to  prescription from the pharmacy until after her next paycheck.  Patient will increase potassium in diet through food intake.  Should she still re-check labs on Friday?  Will send prescription to pharmacy - unavailable in 30 meq, will need to order 2 separate prescriptions, 20 meq and 10 meq    Maria Eugenia Arguello   Ob/Gyn Clinic  RN

## 2018-11-07 RX ORDER — POTASSIUM CHLORIDE 750 MG/1
20 TABLET, EXTENDED RELEASE ORAL DAILY
Qty: 60 TABLET | Refills: 3 | Status: SHIPPED | OUTPATIENT
Start: 2018-11-07 | End: 2019-03-22

## 2018-11-20 ENCOUNTER — HOSPITAL ENCOUNTER (OUTPATIENT)
Dept: ULTRASOUND IMAGING | Facility: CLINIC | Age: 28
Discharge: HOME | End: 2018-11-20
Attending: OBSTETRICS & GYNECOLOGY | Admitting: OBSTETRICS & GYNECOLOGY

## 2018-11-20 ENCOUNTER — TELEPHONE (OUTPATIENT)
Dept: FAMILY MEDICINE | Facility: CLINIC | Age: 28
End: 2018-11-20

## 2018-11-20 ENCOUNTER — OFFICE VISIT (OUTPATIENT)
Dept: FAMILY MEDICINE | Facility: CLINIC | Age: 28
End: 2018-11-20

## 2018-11-20 ENCOUNTER — HOSPITAL ENCOUNTER (OUTPATIENT)
Dept: ULTRASOUND IMAGING | Facility: CLINIC | Age: 28
End: 2018-11-20
Attending: NURSE PRACTITIONER

## 2018-11-20 VITALS
SYSTOLIC BLOOD PRESSURE: 122 MMHG | HEART RATE: 99 BPM | RESPIRATION RATE: 16 BRPM | WEIGHT: 167 LBS | DIASTOLIC BLOOD PRESSURE: 78 MMHG | OXYGEN SATURATION: 98 % | TEMPERATURE: 98.7 F | HEIGHT: 63 IN | BODY MASS INDEX: 29.59 KG/M2

## 2018-11-20 DIAGNOSIS — R10.32 GROIN PAIN, LEFT: ICD-10-CM

## 2018-11-20 DIAGNOSIS — R59.0 LOCALIZED ENLARGED LYMPH NODES: Primary | ICD-10-CM

## 2018-11-20 DIAGNOSIS — R19.09 LUMP IN THE GROIN: ICD-10-CM

## 2018-11-20 DIAGNOSIS — R59.9 ENLARGED LYMPH NODES: Primary | ICD-10-CM

## 2018-11-20 PROCEDURE — 76805 OB US >/= 14 WKS SNGL FETUS: CPT

## 2018-11-20 PROCEDURE — 76882 US LMTD JT/FCL EVL NVASC XTR: CPT | Mod: LT

## 2018-11-20 PROCEDURE — 99213 OFFICE O/P EST LOW 20 MIN: CPT | Performed by: NURSE PRACTITIONER

## 2018-11-20 NOTE — MR AVS SNAPSHOT
After Visit Summary   11/20/2018    Mora Reardon    MRN: [id number]           Patient Information     Date Of Birth          1990        Visit Information        Provider Department      11/20/2018 11:40 AM Yasmin Tao NP [facility]        Today's Diagnoses     Lump in the groin    -  1    Groin pain, left          Care Instructions    1.  Ultrasound today.  2.  I will contact you with results and recommendations when I receive them.  3.  Tylenol as needed for pain.          Follow-ups after your visit        Your next 10 appointments already scheduled     Nov 20, 2018 12:30 PM CST   US OB < 14 WEEKS WITH TRANSVAGINAL SINGLE with WYUS2   [facility] Ul[facility]ound ([facility])    [address] [address]   [phone]           How do I prepare for my exam? (Food and drink instructions) Drink four 8-ounce glasses of fluid. Finish drinking an hour before your exam, so you have a full bladder. If you need to empty your bladder before your exam, try to release only a little urine. Then, drink another glass of fluid.  How do I prepare for my exam? (Other instructions) You may have up to two family members in the exam room. If you bring a small child, an adult must be there to care for him or her. No video or camera photography during the procedure.  What should I wear: Wear comfortable clothes.  How long does the exam take: Most ultrasounds take 30 to 60 minutes.  What should I bring: Bring a list of your medicines, including vitamins, minerals and over-the-counter drugs. It is safest to leave personal items at home.  Do I need a :  No  is needed.  What do I need to tell my doctor: Tell your doctor about any allergies you may have.  What should I do after the exam: No restrictions, you may resume normal activities.  What is this test: An ultrasound uses sound waves to make pictures of the body. Sound waves do not cause  "pain. The only discomfort may be the pressure of the wand against your skin or full bladder.  Who should I call with questions: If you have any questions, please call the Imaging Department where you will have your exam. Directions, parking instructions, and other information are available on our website, [email or web address].              Future tests that were ordered for you today     Open Future Orders        Priority Expected Expires Ordered    US Abdomen Limited Routine  11/20/2019 11/20/2018            Who to contact     If you have questions or need follow up information about today's clinic visit or your schedule please contact [facility] directly at [phone].  Normal or non-critical lab and imaging results will be communicated to you by MyChart, letter or phone within 4 business days after the clinic has received the results. If you do not hear from us within 7 days, please contact the clinic through MyChart or phone. If you have a critical or abnormal lab result, we will notify you by phone as soon as possible.  Submit refill requests through [employer] or call your pharmacy and they will forward the refill request to us. Please allow 3 business days for your refill to be completed.          Additional Information About Your Visit        Care EveryWhere ID     This is your Care EveryWhere ID. This could be used by other organizations to access your [facility] medical records  [id number]        Your Vitals Were     Pulse Temperature Respirations Height Last Period Pulse Oximetry    99 98.7  F (37.1  C) (Tympanic) 16 5' 2.5\" (1.588 m) (LMP Unknown) 98%    BMI (Body Mass Index)                   30.06 kg/m2            Blood Pressure from Last 3 Encounters:   11/20/18 122/78   11/05/18 113/65   09/18/18 (P) 120/68    Weight from Last 3 Encounters:   11/20/18 167 lb (75.8 kg)   11/05/18 163 lb (73.9 kg)   09/18/18 148 lb (67.1 kg)               Primary Care Provider Office Phone # Fax #    " Norma Montes, APRN [phone]       [address] [place] [id number]        Equal Access to Services     RODGER LEWIS : Hadii aad ku hadmilabrad Canali, trina geovannaamber, nancy kajesida denia, tawny stevein hayaan demetraderrell murdock silvia griggs. So [facility] [phone].    ATENCIÓN: Si habla español, tiene a salgado disposición servicios gratuitos de asistencia lingüística. Llame al [phone].    We comply with applicable federal civil rights laws and [place] laws. We do not discriminate on the basis of race, color, national origin, age, disability, sex, sexual orientation, or gender identity.            Thank you!     Thank you for choosing [facility]  for your care. Our goal is always to provide you with excellent care. Hearing back from our patients is one way we can continue to improve our services. Please take a few minutes to complete the written survey that you may receive in the mail after your visit with us. Thank you!             Your Updated Medication List - Protect others around you: Learn how to safely use, store and throw away your medicines at www.disposemymeds.org.          This list is accurate as of 11/20/18 12:01 PM.  Always use your most recent med list.                   Brand Name Dispense Instructions for use Diagnosis    potassium chloride SA 10 MEQ CR tablet    K-DUR/KLOR-CON M    60 tablet    Take 2 tablets (20 mEq) by mouth daily    Hypokalemia       prenatal multivitamin plus iron 27-0.8 MG Tabs per tablet      Take 1 tablet by mouth daily

## 2018-11-20 NOTE — PROGRESS NOTES
"  SUBJECTIVE:   Mora Reardon is a 28 year old female who presents to clinic today for the following health issues:      Muscle Pain    Onset: 1 day    Description: Left side groin pain. Pt reports feeling a \"weird ball\" in her groin area.  Location: Left groin  Character: Sharp and Dull ache    Intensity: 5/10    Progression of Symptoms: worse    Accompanying Signs & Symptoms:  Other symptoms:Slight radiation of pain to her left thigh    History:   Previous similar pain: no       Precipitating factors:   Trauma or overuse: no (Pt states that she started working again and has been more active recently, but no known trauma or injury noted)    Alleviating factors:  Improved by: Unknown    Therapies Tried and outcome: None    Problem list and histories reviewed & adjusted, as indicated.  Additional history: as documented    Patient Active Problem List   Diagnosis     Bipolar disorder (H)     Constipation     Bartter's syndrome     Patient non-compliance     CTS (carpal tunnel syndrome)     GERD (gastroesophageal reflux disease)     CARDIOVASCULAR SCREENING; LDL GOAL LESS THAN 160     Prenatal care, subsequent pregnancy     Rh negative, antepartum     Past Surgical History:   Procedure Laterality Date     TOE SURGERY      removal of glass       Social History   Substance Use Topics     Smoking status: Former Smoker     Packs/day: 0.50     Years: 6.00     Types: Cigarettes     Quit date: 9/16/2018     Smokeless tobacco: Never Used     Alcohol use No     Family History   Problem Relation Age of Onset     Diabetes Mother      Thyroid Disease Mother      Depression Mother      Hypertension Paternal Grandmother      Cancer - colorectal Paternal Grandmother      colon     Cancer Paternal Grandfather      tongue cancer, not smoker     Genetic Disorder Sister      Bartter Syndrome         Current Outpatient Prescriptions   Medication Sig Dispense Refill     potassium chloride SA (K-DUR/KLOR-CON M) 10 MEQ CR tablet Take 2 " "tablets (20 mEq) by mouth daily 60 tablet 3     Prenatal Vit-Fe Fumarate-FA (PRENATAL MULTIVITAMIN PLUS IRON) 27-0.8 MG TABS per tablet Take 1 tablet by mouth daily       Allergies   Allergen Reactions     Keflex [Cephalexin Monohydrate] Hives and Rash       Reviewed and updated as needed this visit by clinical staff  Tobacco  Allergies  Meds  Problems  Med Hx  Surg Hx  Fam Hx  Soc Hx        Reviewed and updated as needed this visit by Provider  Allergies  Meds  Problems         ROS:  CONSTITUTIONAL: NEGATIVE for fever, chills, change in weight  RESP: NEGATIVE for significant cough or SOB  CV: NEGATIVE for chest pain, palpitations or peripheral edema  GI: POSITIVE for low groin pain with lump    OBJECTIVE:     /78  Pulse 99  Temp 98.7  F (37.1  C) (Tympanic)  Resp 16  Ht 5' 2.5\" (1.588 m)  Wt 167 lb (75.8 kg)  LMP  (LMP Unknown)  SpO2 98%  BMI 30.06 kg/m2  Body mass index is 30.06 kg/(m^2).  GENERAL: healthy, alert and no distress  RESP: lungs clear to auscultation - no rales, rhonchi or wheezes  CV: regular rate and rhythm, normal S1 S2, no S3 or S4, no murmur, click or rub, no peripheral edema and peripheral pulses strong  ABDOMEN: soft, nontender, tenderness anterior inguinal area, bowel sounds normal and mass hard and mobile in left inguinal area that is approximately 2-3 cm in diameter.  PSYCH: mentation appears normal, affect normal/bright    Diagnostic Test Results:  none     ASSESSMENT/PLAN:     1. Lump in the groin  Concerned for enlarged lymph node.  This area is firm and mobile.  Patient is undergoing US for OB today.  Will add on limited US of groin mass for evaluation.  Tylenol recommended for pain.  Discussed with patient that I will contact her with results when they are resulted.  - US Abdomen Limited; Future    2. Groin pain, left  - US Abdomen Limited; Future    See Patient Instructions    Yasmin Tao NP  [facility]    Addendum:  US shows enlarged " inguinal lymph node.  Recommend CBC/diff to rule out infection as cause.  Continue tylenol for pain.  Patient should follow-up in 3-4 weeks if any persistent symptoms if CBC/diff is normal for repeat US.

## 2018-11-20 NOTE — PATIENT INSTRUCTIONS
1.  Ultrasound today.  2.  I will contact you with results and recommendations when I receive them.  3.  Tylenol as needed for pain.

## 2018-11-20 NOTE — LETTER
Mora Reardon  [address] [place] [phone]              Dear Mora,      We have been attempting to contact you by phone over the last week.  Your ultrasound showed a enlarged lymph node.  Your provider felt this should resolve in 2-3 weeks or so.  If not improving we will want to do further testing.  Please give us a call if needed.       Thank you,              Your [facility] Team/Ann BALLARD RN

## 2018-11-28 NOTE — ADDENDUM NOTE
Encounter addended by: Ann Gordon RN on: 11/28/2018  1:04 PM<BR>     Actions taken: Letter status changed

## 2018-12-06 NOTE — PROGRESS NOTES
Call to pt to notify of below.  Unable to reach.  Left message for pt to call back   Also left message on EC phone number for patient to call clinic.  Patient due for appointment - can assist patient with scheduling.    Maria Eugenia Arguello   Ob/Gyn Clinic  RN

## 2019-02-25 ENCOUNTER — TELEPHONE (OUTPATIENT)
Dept: OBGYN | Facility: CLINIC | Age: 29
End: 2019-02-25

## 2019-02-25 NOTE — TELEPHONE ENCOUNTER
Pt is requesting proof of pregnancy be faxed to:  [phone] ([facility])    If questions, contact pt @:  [phone] (ok to leave message)    Denise Behrendt  [facility] CSS

## 2019-02-25 NOTE — TELEPHONE ENCOUNTER
"Return call to patient.  Patient advised her signature is also required on the form. Patient does not know if she can get here to sign the form but is aware and \"will try to get a ride up.\"    Offered patient clinic appointment.  Patient reports she is planning on delivering here but \" has not really been seeing anyone for office visits.\"    Maria Eugenia Arguello   Ob/Gyn Clinic  RN    "

## 2019-02-28 ENCOUNTER — PRENATAL OFFICE VISIT (OUTPATIENT)
Dept: OBGYN | Facility: CLINIC | Age: 29
End: 2019-02-28
Payer: MEDICAID

## 2019-02-28 VITALS
RESPIRATION RATE: 16 BRPM | BODY MASS INDEX: 32.2 KG/M2 | HEIGHT: 63 IN | HEART RATE: 94 BPM | DIASTOLIC BLOOD PRESSURE: 75 MMHG | WEIGHT: 181.7 LBS | TEMPERATURE: 98.4 F | SYSTOLIC BLOOD PRESSURE: 116 MMHG

## 2019-02-28 DIAGNOSIS — Z34.83 PRENATAL CARE, SUBSEQUENT PREGNANCY IN THIRD TRIMESTER: Primary | ICD-10-CM

## 2019-02-28 DIAGNOSIS — O09.30 INSUFFICIENT PRENATAL CARE, UNSPECIFIED TRIMESTER: ICD-10-CM

## 2019-02-28 LAB
AMPHETAMINES UR QL: NOT DETECTED NG/ML
BARBITURATES UR QL SCN: NOT DETECTED NG/ML
BENZODIAZ UR QL SCN: NOT DETECTED NG/ML
BUPRENORPHINE UR QL: NOT DETECTED NG/ML
CANNABINOIDS UR QL: NOT DETECTED NG/ML
COCAINE UR QL SCN: NOT DETECTED NG/ML
D-METHAMPHET UR QL: NOT DETECTED NG/ML
METHADONE UR QL SCN: NOT DETECTED NG/ML
OPIATES UR QL SCN: NOT DETECTED NG/ML
OXYCODONE UR QL SCN: NOT DETECTED NG/ML
PCP UR QL SCN: NOT DETECTED NG/ML
PROPOXYPH UR QL: NOT DETECTED NG/ML
TRICYCLICS UR QL SCN: NOT DETECTED NG/ML

## 2019-02-28 PROCEDURE — 80306 DRUG TEST PRSMV INSTRMNT: CPT | Performed by: OBSTETRICS & GYNECOLOGY

## 2019-02-28 PROCEDURE — 87186 SC STD MICRODIL/AGAR DIL: CPT | Performed by: OBSTETRICS & GYNECOLOGY

## 2019-02-28 PROCEDURE — 87653 STREP B DNA AMP PROBE: CPT | Performed by: OBSTETRICS & GYNECOLOGY

## 2019-02-28 PROCEDURE — 99207 ZZC PRENATAL VISIT: CPT | Performed by: OBSTETRICS & GYNECOLOGY

## 2019-02-28 ASSESSMENT — MIFFLIN-ST. JEOR: SCORE: 1505.38

## 2019-02-28 NOTE — LETTER
[facility]  [address] [facility] [address]  Phone: [phone]      February 28, 2019      Mora Reardon  [address] [address] [place] [id number]              To whom it may concern:    Mora Reardon is being seen in our clinic for prenatal care.  Her Estimated Date of Delivery: Apr 4, 2019.       Sincerely,    Star Mancilla MD

## 2019-02-28 NOTE — PROGRESS NOTES
"Doing well.   Did not have medical insurance which is the reason for why she has not had prenatal care since 18 weeks gestation.   Reports that during that absence, she has been diagnosed with trichomonas twice; this was assessed and treated in [facility].   She denies there being any other issues during that time.   Denies VB, ctx, LOF. +FM  /75 (BP Location: Right arm, Patient Position: Chair, Cuff Size: Adult Regular)   Pulse 94   Temp 98.4  F (36.9  C) (Tympanic)   Resp 16   Ht 1.588 m (5' 2.5\")   Wt 82.4 kg (181 lb 11.2 oz)   LMP  (LMP Unknown)   Breastfeeding? No   BMI 32.70 kg/m    General Appearance: NAD  Abdomen: Gravid, NT  Refer to flow sheet above.   A/P: 28 year old  at 35w0d  -- GBS collected today  -- given history of trichomonas in pregnancy, will get GC/CT cultures next visit; left the clinic before she was able to have it collected but will ensure at her next visit that it would be collected  -- Discussed with Mora Reardon, the following; indications; the agents and methods of labor augmentation, including risks, benefits, and alternative approaches; and the possible need for  birth. EFW is AGA. The Labor Induction:what you need to know information sheet was made available to her. Questions and concerns were addressed and patient agrees to above if necessary during the course of her labor.  -- labor precautions reviewed  RTC in 1 week  Star Mancilla MD  [facility]            "

## 2019-02-28 NOTE — NURSING NOTE
"Initial /75 (BP Location: Right arm, Patient Position: Chair, Cuff Size: Adult Regular)   Pulse 94   Temp 98.4  F (36.9  C) (Tympanic)   Resp 16   Ht 1.588 m (5' 2.5\")   Wt 82.4 kg (181 lb 11.2 oz)   LMP  (LMP Unknown)   Breastfeeding? No   BMI 32.70 kg/m   Estimated body mass index is 32.7 kg/m  as calculated from the following:    Height as of this encounter: 1.588 m (5' 2.5\").    Weight as of this encounter: 82.4 kg (181 lb 11.2 oz). .      "

## 2019-03-01 LAB
GP B STREP DNA SPEC QL NAA+PROBE: POSITIVE
SPECIMEN SOURCE: ABNORMAL

## 2019-03-04 PROBLEM — O99.820 GBS (GROUP B STREPTOCOCCUS CARRIER), +RV CULTURE, CURRENTLY PREGNANT: Status: ACTIVE | Noted: 2019-03-04

## 2019-03-04 NOTE — RESULT ENCOUNTER NOTE
GBS positive status.   Will review at next follow-up visit.     Star Mancilla MD  [facility]

## 2019-03-05 LAB
BACTERIA SPEC CULT: ABNORMAL
SPECIMEN SOURCE: ABNORMAL

## 2019-03-15 ENCOUNTER — PRENATAL OFFICE VISIT (OUTPATIENT)
Dept: OBGYN | Facility: CLINIC | Age: 29
End: 2019-03-15
Payer: MEDICAID

## 2019-03-15 VITALS
HEART RATE: 84 BPM | TEMPERATURE: 98.2 F | DIASTOLIC BLOOD PRESSURE: 69 MMHG | HEIGHT: 63 IN | RESPIRATION RATE: 18 BRPM | BODY MASS INDEX: 33.24 KG/M2 | SYSTOLIC BLOOD PRESSURE: 107 MMHG | WEIGHT: 187.6 LBS

## 2019-03-15 DIAGNOSIS — Z34.83 PRENATAL CARE, SUBSEQUENT PREGNANCY IN THIRD TRIMESTER: Primary | ICD-10-CM

## 2019-03-15 DIAGNOSIS — E87.6 HYPOPOTASSEMIA: ICD-10-CM

## 2019-03-15 LAB — POTASSIUM SERPL-SCNC: 3 MMOL/L (ref 3.4–5.3)

## 2019-03-15 PROCEDURE — 36415 COLL VENOUS BLD VENIPUNCTURE: CPT | Performed by: OBSTETRICS & GYNECOLOGY

## 2019-03-15 PROCEDURE — 84132 ASSAY OF SERUM POTASSIUM: CPT | Performed by: OBSTETRICS & GYNECOLOGY

## 2019-03-15 PROCEDURE — 99207 ZZC PRENATAL VISIT: CPT | Performed by: OBSTETRICS & GYNECOLOGY

## 2019-03-15 ASSESSMENT — MIFFLIN-ST. JEOR: SCORE: 1532.14

## 2019-03-15 NOTE — PROGRESS NOTES
"CC: Here for routine prenatal visit @ 37w1d   HPI:  Last checked potassium in Nov--pt does not take supplement due to cost, states she eats a \"dietary source of potassium\"; advised of GBS positive status    PE: /69 (BP Location: Right arm, Patient Position: Chair, Cuff Size: Adult Regular)   Pulse 84   Temp 98.2  F (36.8  C) (Tympanic)   Resp 18   Ht 1.588 m (5' 2.5\")   Wt 85.1 kg (187 lb 9.6 oz)   LMP  (LMP Unknown)   Breastfeeding? No   BMI 33.77 kg/m     See OB flowsheet      A:  1. Prenatal care, subsequent pregnancy in third trimester      2. Bartter's syndrome    - Potassium      Routine prenatal care  If potassium low, pt amenable to taking for a short time; may need K supplement in labort  RTC 1 weeks.      Katey Mckinney M.D.     "

## 2019-03-15 NOTE — NURSING NOTE
"Initial /69 (BP Location: Right arm, Patient Position: Chair, Cuff Size: Adult Regular)   Pulse 84   Temp 98.2  F (36.8  C) (Tympanic)   Resp 18   Ht 1.588 m (5' 2.5\")   Wt 85.1 kg (187 lb 9.6 oz)   LMP  (LMP Unknown)   Breastfeeding? No   BMI 33.77 kg/m   Estimated body mass index is 33.77 kg/m  as calculated from the following:    Height as of this encounter: 1.588 m (5' 2.5\").    Weight as of this encounter: 85.1 kg (187 lb 9.6 oz). .    Mora Sweeney CMA    "

## 2019-03-18 ENCOUNTER — TELEPHONE (OUTPATIENT)
Dept: OBGYN | Facility: CLINIC | Age: 29
End: 2019-03-18

## 2019-03-18 DIAGNOSIS — E87.6 POTASSIUM DEFICIENCY: Primary | ICD-10-CM

## 2019-03-18 RX ORDER — POTASSIUM CHLORIDE 750 MG/1
10 TABLET, EXTENDED RELEASE ORAL DAILY
Qty: 30 TABLET | Refills: 0 | Status: SHIPPED | OUTPATIENT
Start: 2019-03-18 | End: 2021-02-10

## 2019-03-18 NOTE — TELEPHONE ENCOUNTER
Pt given K+ result and need for supplement.    Pt questioned Leakage at this time and wishes to speak with OB RN.  Please call.    (as well as- pt may need rx for the K+ supplement if not already written)    Roula M.   Clinic RN  [facility]

## 2019-03-18 NOTE — TELEPHONE ENCOUNTER
S-(situation): Started leaking of fluid last evening.    B-(background): , 37w4d    A-(assessment): Pt started leaking of fluid last evening and continues today.  She denies having any cramping at this time but says when she had her son her water broke and she never had contractions.  She is having good fetal movement.    R-(recommendations): Pt was advised to be seen at the birth center for further evaluation.  She has their phone number and will give them a call.  She would like the rx for K+ sent to the pharmacy here.    Samra Chandra  [facility] RN

## 2019-03-22 ENCOUNTER — PRENATAL OFFICE VISIT (OUTPATIENT)
Dept: OBGYN | Facility: CLINIC | Age: 29
End: 2019-03-22
Payer: MEDICAID

## 2019-03-22 VITALS
DIASTOLIC BLOOD PRESSURE: 61 MMHG | TEMPERATURE: 98.4 F | SYSTOLIC BLOOD PRESSURE: 120 MMHG | WEIGHT: 187 LBS | BODY MASS INDEX: 33.66 KG/M2 | HEART RATE: 95 BPM

## 2019-03-22 DIAGNOSIS — Z34.83 PRENATAL CARE, SUBSEQUENT PREGNANCY IN THIRD TRIMESTER: Primary | ICD-10-CM

## 2019-03-22 DIAGNOSIS — O23.592 TRICHOMONAL VAGINITIS IN PREGNANCY IN SECOND TRIMESTER: ICD-10-CM

## 2019-03-22 DIAGNOSIS — A59.01 TRICHOMONAL VAGINITIS IN PREGNANCY IN SECOND TRIMESTER: ICD-10-CM

## 2019-03-22 PROCEDURE — 87491 CHLMYD TRACH DNA AMP PROBE: CPT | Performed by: OBSTETRICS & GYNECOLOGY

## 2019-03-22 PROCEDURE — 99207 ZZC PRENATAL VISIT: CPT | Performed by: OBSTETRICS & GYNECOLOGY

## 2019-03-22 PROCEDURE — 87591 N.GONORRHOEAE DNA AMP PROB: CPT | Performed by: OBSTETRICS & GYNECOLOGY

## 2019-03-22 NOTE — PROGRESS NOTES
Doing well.   No complaints. Requests membrane stripping  Denies VB, ctx, LOF. +FM  /61 (BP Location: Right arm, Patient Position: Chair, Cuff Size: Adult Large)   Pulse 95   Temp 98.4  F (36.9  C) (Tympanic)   Wt 84.8 kg (187 lb)   LMP  (LMP Unknown)   BMI 33.66 kg/m    General Appearance: NAD  Abdomen: Gravid, NT  Refer to flow sheet above.   A/P: 28 year old  at 38w1d  -- membranes swept  -- GC/CT cultures collected given hx of trichomonas infection earlier in pregnancy   -- labor precautions reviewed  RTC in 1 week    Star Mancilla MD  [facility]

## 2019-03-22 NOTE — NURSING NOTE
"Initial /61 (BP Location: Right arm, Patient Position: Chair, Cuff Size: Adult Large)   Pulse 95   Temp 98.4  F (36.9  C) (Tympanic)   Wt 84.8 kg (187 lb)   LMP  (LMP Unknown)   BMI 33.66 kg/m   Estimated body mass index is 33.66 kg/m  as calculated from the following:    Height as of 3/15/19: 1.588 m (5' 2.5\").    Weight as of this encounter: 84.8 kg (187 lb). .    Anai Hendrix    "

## 2019-03-26 ENCOUNTER — HOSPITAL ENCOUNTER (INPATIENT)
Facility: CLINIC | Age: 29
LOS: 2 days | Discharge: HOME | End: 2019-03-28
Attending: OBSTETRICS & GYNECOLOGY | Admitting: OBSTETRICS & GYNECOLOGY
Payer: MEDICAID

## 2019-03-26 ENCOUNTER — ANESTHESIA (OUTPATIENT)
Dept: OBGYN | Facility: CLINIC | Age: 29
End: 2019-03-26
Payer: MEDICAID

## 2019-03-26 ENCOUNTER — ANESTHESIA (OUTPATIENT)
Dept: SURGERY | Facility: CLINIC | Age: 29
End: 2019-03-26
Payer: MEDICAID

## 2019-03-26 ENCOUNTER — ANESTHESIA EVENT (OUTPATIENT)
Dept: SURGERY | Facility: CLINIC | Age: 29
End: 2019-03-26
Payer: MEDICAID

## 2019-03-26 ENCOUNTER — ANESTHESIA EVENT (OUTPATIENT)
Dept: OBGYN | Facility: CLINIC | Age: 29
End: 2019-03-26
Payer: MEDICAID

## 2019-03-26 PROBLEM — Z36.89 ENCOUNTER FOR TRIAGE IN PREGNANT PATIENT: Status: ACTIVE | Noted: 2019-03-26

## 2019-03-26 LAB
ABO + RH BLD: NORMAL
ALBUMIN UR-MCNC: NEGATIVE MG/DL
AMPHETAMINES UR QL SCN: NEGATIVE
ANION GAP SERPL CALCULATED.3IONS-SCNC: 7 MMOL/L (ref 3–14)
APPEARANCE UR: ABNORMAL
BACTERIA #/AREA URNS HPF: ABNORMAL /HPF
BARBITURATES UR QL: NEGATIVE
BENZODIAZ UR QL: NEGATIVE
BILIRUB UR QL STRIP: NEGATIVE
BLD GP AB SCN SERPL QL: NORMAL
BLD PROD TYP BPU: NORMAL
BLOOD BANK CMNT PATIENT-IMP: NORMAL
BLOOD BANK CMNT PATIENT-IMP: NORMAL
BUN SERPL-MCNC: 14 MG/DL (ref 7–30)
CALCIUM SERPL-MCNC: 8.4 MG/DL (ref 8.5–10.1)
CANNABINOIDS UR QL SCN: NEGATIVE
CHLORIDE SERPL-SCNC: 105 MMOL/L (ref 94–109)
CO2 SERPL-SCNC: 27 MMOL/L (ref 20–32)
COCAINE UR QL: NEGATIVE
COLOR UR AUTO: YELLOW
CREAT SERPL-MCNC: 0.74 MG/DL (ref 0.52–1.04)
ERYTHROCYTE [DISTWIDTH] IN BLOOD BY AUTOMATED COUNT: 14.3 % (ref 10–15)
GFR SERPL CREATININE-BSD FRML MDRD: >90 ML/MIN/{1.73_M2}
GLUCOSE SERPL-MCNC: 73 MG/DL (ref 70–99)
GLUCOSE UR STRIP-MCNC: NEGATIVE MG/DL
HCT VFR BLD AUTO: 36.3 % (ref 35–47)
HGB BLD-MCNC: 11.6 G/DL (ref 11.7–15.7)
HGB BLD-MCNC: 13 G/DL (ref 11.7–15.7)
HGB UR QL STRIP: NEGATIVE
INR PPP: 1.06 (ref 0.86–1.14)
KETONES UR STRIP-MCNC: NEGATIVE MG/DL
LEUKOCYTE ESTERASE UR QL STRIP: ABNORMAL
MCH RBC QN AUTO: 28.5 PG (ref 26.5–33)
MCHC RBC AUTO-ENTMCNC: 32 G/DL (ref 31.5–36.5)
MCV RBC AUTO: 89 FL (ref 78–100)
MUCOUS THREADS #/AREA URNS LPF: PRESENT /LPF
NITRATE UR QL: NEGATIVE
NUM BPU REQUESTED: 2
OPIATES UR QL SCN: NEGATIVE
PCP UR QL SCN: NEGATIVE
PH UR STRIP: 7 PH (ref 5–7)
PLATELET # BLD AUTO: 193 10E9/L (ref 150–450)
PLATELET # BLD AUTO: 224 10E9/L (ref 150–450)
POTASSIUM SERPL-SCNC: 3.1 MMOL/L (ref 3.4–5.3)
RBC # BLD AUTO: 4.07 10E12/L (ref 3.8–5.2)
RBC #/AREA URNS AUTO: 2 /HPF (ref 0–2)
SODIUM SERPL-SCNC: 139 MMOL/L (ref 133–144)
SOURCE: ABNORMAL
SP GR UR STRIP: 1.02 (ref 1–1.03)
SPECIMEN EXP DATE BLD: NORMAL
SPECIMEN EXP DATE BLD: NORMAL
SQUAMOUS #/AREA URNS AUTO: 23 /HPF (ref 0–1)
UROBILINOGEN UR STRIP-MCNC: 0 MG/DL (ref 0–2)
WBC # BLD AUTO: 12.9 10E9/L (ref 4–11)
WBC #/AREA URNS AUTO: 22 /HPF (ref 0–5)

## 2019-03-26 PROCEDURE — 25000128 H RX IP 250 OP 636: Performed by: NURSE ANESTHETIST, CERTIFIED REGISTERED

## 2019-03-26 PROCEDURE — 00HU33Z INSERTION OF INFUSION DEVICE INTO SPINAL CANAL, PERCUTANEOUS APPROACH: ICD-10-PCS | Performed by: OBSTETRICS & GYNECOLOGY

## 2019-03-26 PROCEDURE — 10D17Z9 MANUAL EXTRACTION OF PRODUCTS OF CONCEPTION, RETAINED, VIA NATURAL OR ARTIFICIAL OPENING: ICD-10-PCS | Performed by: OBSTETRICS & GYNECOLOGY

## 2019-03-26 PROCEDURE — 85027 COMPLETE CBC AUTOMATED: CPT | Performed by: NURSE ANESTHETIST, CERTIFIED REGISTERED

## 2019-03-26 PROCEDURE — 37000011 ZZH ANESTHESIA WARD SERVICE: Performed by: NURSE ANESTHETIST, CERTIFIED REGISTERED

## 2019-03-26 PROCEDURE — 86901 BLOOD TYPING SEROLOGIC RH(D): CPT | Performed by: OBSTETRICS & GYNECOLOGY

## 2019-03-26 PROCEDURE — 27110038 ZZH RX 271: Performed by: NURSE ANESTHETIST, CERTIFIED REGISTERED

## 2019-03-26 PROCEDURE — 86900 BLOOD TYPING SEROLOGIC ABO: CPT | Performed by: OBSTETRICS & GYNECOLOGY

## 2019-03-26 PROCEDURE — 86850 RBC ANTIBODY SCREEN: CPT | Performed by: OBSTETRICS & GYNECOLOGY

## 2019-03-26 PROCEDURE — 37000008 ZZH ANESTHESIA TECHNICAL FEE, 1ST 30 MIN: Performed by: OBSTETRICS & GYNECOLOGY

## 2019-03-26 PROCEDURE — 27110028 ZZH OR GENERAL SUPPLY NON-STERILE: Performed by: OBSTETRICS & GYNECOLOGY

## 2019-03-26 PROCEDURE — 80307 DRUG TEST PRSMV CHEM ANLYZR: CPT | Performed by: OBSTETRICS & GYNECOLOGY

## 2019-03-26 PROCEDURE — 25000132 ZZH RX MED GY IP 250 OP 250 PS 637: Performed by: OBSTETRICS & GYNECOLOGY

## 2019-03-26 PROCEDURE — 40000305 ZZH STATISTIC PRE PROC ASSESS I: Performed by: OBSTETRICS & GYNECOLOGY

## 2019-03-26 PROCEDURE — 25800030 ZZH RX IP 258 OP 636: Performed by: NURSE ANESTHETIST, CERTIFIED REGISTERED

## 2019-03-26 PROCEDURE — 40000671 ZZH STATISTIC ANESTHESIA CASE

## 2019-03-26 PROCEDURE — 25000125 ZZHC RX 250: Performed by: NURSE ANESTHETIST, CERTIFIED REGISTERED

## 2019-03-26 PROCEDURE — 25800030 ZZH RX IP 258 OP 636: Performed by: OBSTETRICS & GYNECOLOGY

## 2019-03-26 PROCEDURE — 10907ZC DRAINAGE OF AMNIOTIC FLUID, THERAPEUTIC FROM PRODUCTS OF CONCEPTION, VIA NATURAL OR ARTIFICIAL OPENING: ICD-10-PCS | Performed by: OBSTETRICS & GYNECOLOGY

## 2019-03-26 PROCEDURE — 72200001 ZZH LABOR CARE VAGINAL DELIVERY SINGLE

## 2019-03-26 PROCEDURE — 71000012 ZZH RECOVERY PHASE 1 LEVEL 1 FIRST HR: Performed by: OBSTETRICS & GYNECOLOGY

## 2019-03-26 PROCEDURE — 36000050 ZZH SURGERY LEVEL 2 1ST 30 MIN: Performed by: OBSTETRICS & GYNECOLOGY

## 2019-03-26 PROCEDURE — 25000125 ZZHC RX 250: Performed by: OBSTETRICS & GYNECOLOGY

## 2019-03-26 PROCEDURE — 12000000 ZZH R&B MED SURG/OB

## 2019-03-26 PROCEDURE — 88307 TISSUE EXAM BY PATHOLOGIST: CPT | Performed by: OBSTETRICS & GYNECOLOGY

## 2019-03-26 PROCEDURE — 59400 OBSTETRICAL CARE: CPT | Performed by: OBSTETRICS & GYNECOLOGY

## 2019-03-26 PROCEDURE — 25000128 H RX IP 250 OP 636: Performed by: OBSTETRICS & GYNECOLOGY

## 2019-03-26 PROCEDURE — 36000052 ZZH SURGERY LEVEL 2 EA 15 ADDTL MIN: Performed by: OBSTETRICS & GYNECOLOGY

## 2019-03-26 PROCEDURE — 85049 AUTOMATED PLATELET COUNT: CPT | Performed by: OBSTETRICS & GYNECOLOGY

## 2019-03-26 PROCEDURE — 36415 COLL VENOUS BLD VENIPUNCTURE: CPT | Performed by: OBSTETRICS & GYNECOLOGY

## 2019-03-26 PROCEDURE — 0064U ANTB TP TOTAL&RPR IA QUAL: CPT | Performed by: OBSTETRICS & GYNECOLOGY

## 2019-03-26 PROCEDURE — 85610 PROTHROMBIN TIME: CPT | Performed by: NURSE ANESTHETIST, CERTIFIED REGISTERED

## 2019-03-26 PROCEDURE — 88307 TISSUE EXAM BY PATHOLOGIST: CPT | Mod: 26 | Performed by: OBSTETRICS & GYNECOLOGY

## 2019-03-26 PROCEDURE — 85018 HEMOGLOBIN: CPT | Performed by: OBSTETRICS & GYNECOLOGY

## 2019-03-26 PROCEDURE — 80048 BASIC METABOLIC PNL TOTAL CA: CPT | Performed by: OBSTETRICS & GYNECOLOGY

## 2019-03-26 PROCEDURE — 37000009 ZZH ANESTHESIA TECHNICAL FEE, EACH ADDTL 15 MIN: Performed by: OBSTETRICS & GYNECOLOGY

## 2019-03-26 PROCEDURE — 27210794 ZZH OR GENERAL SUPPLY STERILE: Performed by: OBSTETRICS & GYNECOLOGY

## 2019-03-26 PROCEDURE — 3E0R3BZ INTRODUCTION OF ANESTHETIC AGENT INTO SPINAL CANAL, PERCUTANEOUS APPROACH: ICD-10-PCS | Performed by: OBSTETRICS & GYNECOLOGY

## 2019-03-26 PROCEDURE — 59160 D & C AFTER DELIVERY: CPT | Performed by: OBSTETRICS & GYNECOLOGY

## 2019-03-26 PROCEDURE — 81001 URINALYSIS AUTO W/SCOPE: CPT | Performed by: OBSTETRICS & GYNECOLOGY

## 2019-03-26 RX ORDER — POTASSIUM CHLORIDE 750 MG/1
10 TABLET, EXTENDED RELEASE ORAL DAILY
Status: DISCONTINUED | OUTPATIENT
Start: 2019-03-26 | End: 2019-03-28 | Stop reason: HOSPADM

## 2019-03-26 RX ORDER — ONDANSETRON 4 MG/1
4 TABLET, ORALLY DISINTEGRATING ORAL EVERY 6 HOURS PRN
Status: DISCONTINUED | OUTPATIENT
Start: 2019-03-26 | End: 2019-03-28 | Stop reason: HOSPADM

## 2019-03-26 RX ORDER — SODIUM CHLORIDE, SODIUM LACTATE, POTASSIUM CHLORIDE, CALCIUM CHLORIDE 600; 310; 30; 20 MG/100ML; MG/100ML; MG/100ML; MG/100ML
INJECTION, SOLUTION INTRAVENOUS CONTINUOUS PRN
Status: DISCONTINUED | OUTPATIENT
Start: 2019-03-26 | End: 2019-03-26

## 2019-03-26 RX ORDER — OXYTOCIN/0.9 % SODIUM CHLORIDE 30/500 ML
1-24 PLASTIC BAG, INJECTION (ML) INTRAVENOUS CONTINUOUS
Status: DISCONTINUED | OUTPATIENT
Start: 2019-03-26 | End: 2019-03-28 | Stop reason: HOSPADM

## 2019-03-26 RX ORDER — ONDANSETRON 2 MG/ML
4 INJECTION INTRAMUSCULAR; INTRAVENOUS EVERY 6 HOURS PRN
Status: DISCONTINUED | OUTPATIENT
Start: 2019-03-26 | End: 2019-03-28 | Stop reason: HOSPADM

## 2019-03-26 RX ORDER — OXYTOCIN/0.9 % SODIUM CHLORIDE 30/500 ML
340 PLASTIC BAG, INJECTION (ML) INTRAVENOUS CONTINUOUS PRN
Status: DISCONTINUED | OUTPATIENT
Start: 2019-03-26 | End: 2019-03-28 | Stop reason: HOSPADM

## 2019-03-26 RX ORDER — HYDROCORTISONE 2.5 %
CREAM (GRAM) TOPICAL 3 TIMES DAILY PRN
Status: DISCONTINUED | OUTPATIENT
Start: 2019-03-26 | End: 2019-03-28 | Stop reason: HOSPADM

## 2019-03-26 RX ORDER — OXYCODONE AND ACETAMINOPHEN 5; 325 MG/1; MG/1
1 TABLET ORAL
Status: DISCONTINUED | OUTPATIENT
Start: 2019-03-26 | End: 2019-03-28 | Stop reason: HOSPADM

## 2019-03-26 RX ORDER — PROPOFOL 10 MG/ML
INJECTION, EMULSION INTRAVENOUS PRN
Status: DISCONTINUED | OUTPATIENT
Start: 2019-03-26 | End: 2019-03-26

## 2019-03-26 RX ORDER — AMOXICILLIN 250 MG
2 CAPSULE ORAL 2 TIMES DAILY
Status: DISCONTINUED | OUTPATIENT
Start: 2019-03-26 | End: 2019-03-28 | Stop reason: HOSPADM

## 2019-03-26 RX ORDER — FENTANYL CITRATE 50 UG/ML
50-100 INJECTION, SOLUTION INTRAMUSCULAR; INTRAVENOUS
Status: DISCONTINUED | OUTPATIENT
Start: 2019-03-26 | End: 2019-03-28 | Stop reason: HOSPADM

## 2019-03-26 RX ORDER — FENTANYL CITRATE 50 UG/ML
25-50 INJECTION, SOLUTION INTRAMUSCULAR; INTRAVENOUS
Status: DISCONTINUED | OUTPATIENT
Start: 2019-03-26 | End: 2019-03-26 | Stop reason: HOSPADM

## 2019-03-26 RX ORDER — METHYLERGONOVINE MALEATE 0.2 MG/ML
200 INJECTION INTRAVENOUS
Status: DISCONTINUED | OUTPATIENT
Start: 2019-03-26 | End: 2019-03-28 | Stop reason: HOSPADM

## 2019-03-26 RX ORDER — LIDOCAINE HYDROCHLORIDE AND EPINEPHRINE 15; 5 MG/ML; UG/ML
INJECTION, SOLUTION EPIDURAL PRN
Status: DISCONTINUED | OUTPATIENT
Start: 2019-03-26 | End: 2019-03-26

## 2019-03-26 RX ORDER — NALOXONE HYDROCHLORIDE 0.4 MG/ML
.1-.4 INJECTION, SOLUTION INTRAMUSCULAR; INTRAVENOUS; SUBCUTANEOUS
Status: DISCONTINUED | OUTPATIENT
Start: 2019-03-26 | End: 2019-03-28 | Stop reason: HOSPADM

## 2019-03-26 RX ORDER — ONDANSETRON 2 MG/ML
4 INJECTION INTRAMUSCULAR; INTRAVENOUS EVERY 30 MIN PRN
Status: DISCONTINUED | OUTPATIENT
Start: 2019-03-26 | End: 2019-03-26 | Stop reason: HOSPADM

## 2019-03-26 RX ORDER — DIPHENHYDRAMINE HYDROCHLORIDE 50 MG/ML
25 INJECTION INTRAMUSCULAR; INTRAVENOUS EVERY 6 HOURS PRN
Status: DISCONTINUED | OUTPATIENT
Start: 2019-03-26 | End: 2019-03-28 | Stop reason: HOSPADM

## 2019-03-26 RX ORDER — DIPHENHYDRAMINE HCL 25 MG
25 CAPSULE ORAL EVERY 6 HOURS PRN
Status: DISCONTINUED | OUTPATIENT
Start: 2019-03-26 | End: 2019-03-28 | Stop reason: HOSPADM

## 2019-03-26 RX ORDER — TERBUTALINE SULFATE 1 MG/ML
0.25 INJECTION, SOLUTION SUBCUTANEOUS ONCE
Status: DISCONTINUED | OUTPATIENT
Start: 2019-03-26 | End: 2019-03-28 | Stop reason: HOSPADM

## 2019-03-26 RX ORDER — CARBOPROST TROMETHAMINE 250 UG/ML
250 INJECTION, SOLUTION INTRAMUSCULAR
Status: DISCONTINUED | OUTPATIENT
Start: 2019-03-26 | End: 2019-03-28 | Stop reason: HOSPADM

## 2019-03-26 RX ORDER — CLINDAMYCIN PHOSPHATE 900 MG/50ML
900 INJECTION, SOLUTION INTRAVENOUS EVERY 8 HOURS
Status: DISCONTINUED | OUTPATIENT
Start: 2019-03-26 | End: 2019-03-27 | Stop reason: CLARIF

## 2019-03-26 RX ORDER — EPHEDRINE SULFATE 50 MG/ML
5 INJECTION, SOLUTION INTRAMUSCULAR; INTRAVENOUS; SUBCUTANEOUS
Status: DISCONTINUED | OUTPATIENT
Start: 2019-03-26 | End: 2019-03-28 | Stop reason: HOSPADM

## 2019-03-26 RX ORDER — OXYTOCIN/0.9 % SODIUM CHLORIDE 30/500 ML
100-340 PLASTIC BAG, INJECTION (ML) INTRAVENOUS CONTINUOUS PRN
Status: DISCONTINUED | OUTPATIENT
Start: 2019-03-26 | End: 2019-03-28 | Stop reason: HOSPADM

## 2019-03-26 RX ORDER — BUPIVACAINE HYDROCHLORIDE 2.5 MG/ML
INJECTION, SOLUTION EPIDURAL; INFILTRATION; INTRACAUDAL
Status: COMPLETED
Start: 2019-03-26 | End: 2019-03-26

## 2019-03-26 RX ORDER — ACETAMINOPHEN 325 MG/1
650 TABLET ORAL EVERY 4 HOURS PRN
Status: DISCONTINUED | OUTPATIENT
Start: 2019-03-26 | End: 2019-03-28 | Stop reason: HOSPADM

## 2019-03-26 RX ORDER — NALOXONE HYDROCHLORIDE 0.4 MG/ML
.1-.4 INJECTION, SOLUTION INTRAMUSCULAR; INTRAVENOUS; SUBCUTANEOUS
Status: ACTIVE | OUTPATIENT
Start: 2019-03-26 | End: 2019-03-27

## 2019-03-26 RX ORDER — OXYTOCIN 10 [USP'U]/ML
10 INJECTION, SOLUTION INTRAMUSCULAR; INTRAVENOUS
Status: DISCONTINUED | OUTPATIENT
Start: 2019-03-26 | End: 2019-03-28 | Stop reason: HOSPADM

## 2019-03-26 RX ORDER — IBUPROFEN 800 MG/1
800 TABLET, FILM COATED ORAL
Status: COMPLETED | OUTPATIENT
Start: 2019-03-26 | End: 2019-03-27

## 2019-03-26 RX ORDER — LIDOCAINE HYDROCHLORIDE 10 MG/ML
INJECTION, SOLUTION INFILTRATION; PERINEURAL PRN
Status: DISCONTINUED | OUTPATIENT
Start: 2019-03-26 | End: 2019-03-26

## 2019-03-26 RX ORDER — FENTANYL CITRATE 50 UG/ML
INJECTION, SOLUTION INTRAMUSCULAR; INTRAVENOUS
Status: COMPLETED
Start: 2019-03-26 | End: 2019-03-26

## 2019-03-26 RX ORDER — LIDOCAINE HCL/EPINEPHRINE/PF 2%-1:200K
VIAL (ML) INJECTION PRN
Status: DISCONTINUED | OUTPATIENT
Start: 2019-03-26 | End: 2019-03-26

## 2019-03-26 RX ORDER — LIDOCAINE HCL/EPINEPHRINE/PF 2%-1:200K
VIAL (ML) INJECTION
Status: COMPLETED
Start: 2019-03-26 | End: 2019-03-26

## 2019-03-26 RX ORDER — AMOXICILLIN 250 MG
1 CAPSULE ORAL 2 TIMES DAILY
Status: DISCONTINUED | OUTPATIENT
Start: 2019-03-26 | End: 2019-03-28 | Stop reason: HOSPADM

## 2019-03-26 RX ORDER — SODIUM CHLORIDE, SODIUM LACTATE, POTASSIUM CHLORIDE, CALCIUM CHLORIDE 600; 310; 30; 20 MG/100ML; MG/100ML; MG/100ML; MG/100ML
INJECTION, SOLUTION INTRAVENOUS CONTINUOUS
Status: DISCONTINUED | OUTPATIENT
Start: 2019-03-26 | End: 2019-03-28 | Stop reason: HOSPADM

## 2019-03-26 RX ORDER — LIDOCAINE 40 MG/G
CREAM TOPICAL
Status: DISCONTINUED | OUTPATIENT
Start: 2019-03-26 | End: 2019-03-28 | Stop reason: HOSPADM

## 2019-03-26 RX ORDER — EPHEDRINE SULFATE 50 MG/ML
INJECTION, SOLUTION INTRAMUSCULAR; INTRAVENOUS; SUBCUTANEOUS
Status: DISCONTINUED
Start: 2019-03-26 | End: 2019-03-26 | Stop reason: WASHOUT

## 2019-03-26 RX ORDER — LIDOCAINE HYDROCHLORIDE 10 MG/ML
INJECTION, SOLUTION EPIDURAL; INFILTRATION; INTRACAUDAL; PERINEURAL
Status: COMPLETED
Start: 2019-03-26 | End: 2019-03-26

## 2019-03-26 RX ORDER — OXYTOCIN/0.9 % SODIUM CHLORIDE 30/500 ML
100 PLASTIC BAG, INJECTION (ML) INTRAVENOUS CONTINUOUS
Status: DISCONTINUED | OUTPATIENT
Start: 2019-03-26 | End: 2019-03-28 | Stop reason: HOSPADM

## 2019-03-26 RX ORDER — SCOLOPAMINE TRANSDERMAL SYSTEM 1 MG/1
1 PATCH, EXTENDED RELEASE TRANSDERMAL ONCE
Status: DISCONTINUED | OUTPATIENT
Start: 2019-03-26 | End: 2019-03-28 | Stop reason: HOSPADM

## 2019-03-26 RX ORDER — MEPERIDINE HYDROCHLORIDE 25 MG/ML
12.5 INJECTION INTRAMUSCULAR; INTRAVENOUS; SUBCUTANEOUS ONCE
Status: COMPLETED | OUTPATIENT
Start: 2019-03-26 | End: 2019-03-26

## 2019-03-26 RX ORDER — LANOLIN 100 %
OINTMENT (GRAM) TOPICAL
Status: DISCONTINUED | OUTPATIENT
Start: 2019-03-26 | End: 2019-03-28 | Stop reason: HOSPADM

## 2019-03-26 RX ORDER — BISACODYL 10 MG
10 SUPPOSITORY, RECTAL RECTAL DAILY PRN
Status: DISCONTINUED | OUTPATIENT
Start: 2019-03-28 | End: 2019-03-28 | Stop reason: HOSPADM

## 2019-03-26 RX ORDER — DEXAMETHASONE SODIUM PHOSPHATE 4 MG/ML
INJECTION, SOLUTION INTRA-ARTICULAR; INTRALESIONAL; INTRAMUSCULAR; INTRAVENOUS; SOFT TISSUE PRN
Status: DISCONTINUED | OUTPATIENT
Start: 2019-03-26 | End: 2019-03-26

## 2019-03-26 RX ORDER — ONDANSETRON 4 MG/1
4 TABLET, ORALLY DISINTEGRATING ORAL EVERY 30 MIN PRN
Status: DISCONTINUED | OUTPATIENT
Start: 2019-03-26 | End: 2019-03-26 | Stop reason: HOSPADM

## 2019-03-26 RX ORDER — SODIUM CHLORIDE 9 MG/ML
INJECTION, SOLUTION INTRAVENOUS CONTINUOUS PRN
Status: DISCONTINUED | OUTPATIENT
Start: 2019-03-26 | End: 2019-03-26

## 2019-03-26 RX ORDER — FENTANYL CITRATE 50 UG/ML
INJECTION, SOLUTION INTRAMUSCULAR; INTRAVENOUS PRN
Status: DISCONTINUED | OUTPATIENT
Start: 2019-03-26 | End: 2019-03-26

## 2019-03-26 RX ORDER — BUPIVACAINE HYDROCHLORIDE 2.5 MG/ML
INJECTION, SOLUTION EPIDURAL; INFILTRATION; INTRACAUDAL PRN
Status: DISCONTINUED | OUTPATIENT
Start: 2019-03-26 | End: 2019-03-26

## 2019-03-26 RX ORDER — IBUPROFEN 800 MG/1
800 TABLET, FILM COATED ORAL EVERY 6 HOURS PRN
Status: DISCONTINUED | OUTPATIENT
Start: 2019-03-26 | End: 2019-03-28 | Stop reason: HOSPADM

## 2019-03-26 RX ORDER — MISOPROSTOL 200 UG/1
800 TABLET ORAL
Status: DISCONTINUED | OUTPATIENT
Start: 2019-03-26 | End: 2019-03-28 | Stop reason: HOSPADM

## 2019-03-26 RX ADMIN — FENTANYL CITRATE 100 MCG: 50 INJECTION, SOLUTION INTRAMUSCULAR; INTRAVENOUS at 14:23

## 2019-03-26 RX ADMIN — PHENYLEPHRINE HYDROCHLORIDE 100 MCG: 10 INJECTION, SOLUTION INTRAMUSCULAR; INTRAVENOUS; SUBCUTANEOUS at 20:08

## 2019-03-26 RX ADMIN — LIDOCAINE HYDROCHLORIDE 100 MG: 10 INJECTION, SOLUTION INFILTRATION; PERINEURAL at 14:08

## 2019-03-26 RX ADMIN — CLINDAMYCIN IN 5 PERCENT DEXTROSE 900 MG: 18 INJECTION, SOLUTION INTRAVENOUS at 13:29

## 2019-03-26 RX ADMIN — ONDANSETRON 4 MG: 2 INJECTION INTRAMUSCULAR; INTRAVENOUS at 19:44

## 2019-03-26 RX ADMIN — Medication 100 MG: at 19:38

## 2019-03-26 RX ADMIN — LIDOCAINE HYDROCHLORIDE AND EPINEPHRINE 75 MG: 15; 5 INJECTION, SOLUTION EPIDURAL at 14:17

## 2019-03-26 RX ADMIN — SODIUM CHLORIDE, POTASSIUM CHLORIDE, SODIUM LACTATE AND CALCIUM CHLORIDE: 600; 310; 30; 20 INJECTION, SOLUTION INTRAVENOUS at 19:35

## 2019-03-26 RX ADMIN — PHENYLEPHRINE HYDROCHLORIDE 100 MCG: 10 INJECTION, SOLUTION INTRAMUSCULAR; INTRAVENOUS; SUBCUTANEOUS at 19:54

## 2019-03-26 RX ADMIN — BUPIVACAINE HYDROCHLORIDE 10 ML: 2.5 INJECTION, SOLUTION EPIDURAL; INFILTRATION; INTRACAUDAL at 14:23

## 2019-03-26 RX ADMIN — SODIUM CHLORIDE, POTASSIUM CHLORIDE, SODIUM LACTATE AND CALCIUM CHLORIDE: 600; 310; 30; 20 INJECTION, SOLUTION INTRAVENOUS at 14:23

## 2019-03-26 RX ADMIN — Medication: at 14:21

## 2019-03-26 RX ADMIN — CLINDAMYCIN IN 5 PERCENT DEXTROSE 900 MG: 18 INJECTION, SOLUTION INTRAVENOUS at 19:42

## 2019-03-26 RX ADMIN — PHENYLEPHRINE HYDROCHLORIDE 100 MCG: 10 INJECTION, SOLUTION INTRAMUSCULAR; INTRAVENOUS; SUBCUTANEOUS at 20:04

## 2019-03-26 RX ADMIN — ROCURONIUM BROMIDE 50 MG: 10 INJECTION INTRAVENOUS at 19:44

## 2019-03-26 RX ADMIN — PHENYLEPHRINE HYDROCHLORIDE 100 MCG: 10 INJECTION, SOLUTION INTRAMUSCULAR; INTRAVENOUS; SUBCUTANEOUS at 20:14

## 2019-03-26 RX ADMIN — FENTANYL CITRATE 250 MCG: 50 INJECTION, SOLUTION INTRAMUSCULAR; INTRAVENOUS at 19:38

## 2019-03-26 RX ADMIN — SODIUM CHLORIDE, POTASSIUM CHLORIDE, SODIUM LACTATE AND CALCIUM CHLORIDE 1000 ML: 600; 310; 30; 20 INJECTION, SOLUTION INTRAVENOUS at 13:22

## 2019-03-26 RX ADMIN — PHENYLEPHRINE HYDROCHLORIDE 100 MCG: 10 INJECTION, SOLUTION INTRAMUSCULAR; INTRAVENOUS; SUBCUTANEOUS at 19:50

## 2019-03-26 RX ADMIN — DEXAMETHASONE SODIUM PHOSPHATE 8 MG: 4 INJECTION, SOLUTION INTRA-ARTICULAR; INTRALESIONAL; INTRAMUSCULAR; INTRAVENOUS; SOFT TISSUE at 19:44

## 2019-03-26 RX ADMIN — TRANEXAMIC ACID 1 G: 100 INJECTION, SOLUTION INTRAVENOUS at 20:00

## 2019-03-26 RX ADMIN — LIDOCAINE HYDROCHLORIDE,EPINEPHRINE BITARTRATE 10 ML: 20; .005 INJECTION, SOLUTION EPIDURAL; INFILTRATION; INTRACAUDAL; PERINEURAL at 18:20

## 2019-03-26 RX ADMIN — SUGAMMADEX 200 MG: 100 INJECTION, SOLUTION INTRAVENOUS at 20:19

## 2019-03-26 RX ADMIN — SODIUM CHLORIDE: 0.9 INJECTION, SOLUTION INTRAVENOUS at 19:44

## 2019-03-26 RX ADMIN — PROPOFOL 200 MG: 10 INJECTION, EMULSION INTRAVENOUS at 19:38

## 2019-03-26 RX ADMIN — LIDOCAINE HYDROCHLORIDE 40 MG: 10 INJECTION, SOLUTION INFILTRATION; PERINEURAL at 19:38

## 2019-03-26 RX ADMIN — POTASSIUM CHLORIDE 10 MEQ: 750 TABLET, FILM COATED, EXTENDED RELEASE ORAL at 14:29

## 2019-03-26 RX ADMIN — Medication 10 ML/HR: at 14:22

## 2019-03-26 RX ADMIN — MEPERIDINE HYDROCHLORIDE 12.5 MG: 25 INJECTION INTRAMUSCULAR; INTRAVENOUS; SUBCUTANEOUS at 20:49

## 2019-03-26 RX ADMIN — OXYTOCIN-SODIUM CHLORIDE 0.9% IV SOLN 30 UNIT/500ML: 30-0.9/5 SOLUTION at 20:52

## 2019-03-26 ASSESSMENT — ACTIVITIES OF DAILY LIVING (ADL)
SWALLOWING: 0-->SWALLOWS FOODS/LIQUIDS WITHOUT DIFFICULTY
RETIRED_COMMUNICATION: 0-->UNDERSTANDS/COMMUNICATES WITHOUT DIFFICULTY
FALL_HISTORY_WITHIN_LAST_SIX_MONTHS: NO
TOILETING: 0-->INDEPENDENT
BATHING: 0-->INDEPENDENT
COGNITION: 0 - NO COGNITION ISSUES REPORTED
RETIRED_EATING: 0-->INDEPENDENT
DRESS: 0-->INDEPENDENT
AMBULATION: 0-->INDEPENDENT
TRANSFERRING: 0-->INDEPENDENT

## 2019-03-26 ASSESSMENT — LIFESTYLE VARIABLES
TOBACCO_USE: 1
TOBACCO_USE: 1

## 2019-03-26 NOTE — PROGRESS NOTES
Intrapartum Progress Note    S: epidural isn't working the best    O:  Vitals:    19 1451 19 1508 19 1600 19 1720   BP: 118/62 107/60  119/57   BP Location:       Resp:    16   Temp:   98.9  F (37.2  C) 97.8  F (36.6  C)   TempSrc:   Oral Axillary   SpO2: 99% 99% 98%      General: comfortable between contractions  Cervix: 8/90/-2 > AROM clear fluid, head brought down to 0 station    A/P: 28 year old  @ 38w5d here for spontaneous labor  - Labor: now s/p AROM, can augment with pitocin if labor stalling out  - Pain management: epidural in place  - GBS pos, s/p 4h clindamycin  - Anticipate     Savi Smart MD  [facility] OB/[facility]yn
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
## 2019-03-26 NOTE — ANESTHESIA PROCEDURE NOTES
Peripheral nerve/Neuraxial procedure note : epidural catheter  Pre-Procedure  Performed by  Irene Booker APRN CRNA   Location: OB      Pre-Anesthestic Checklist: patient identified, IV checked, risks and benefits discussed, informed consent, monitors and equipment checked, pre-op evaluation and at physician/surgeon's request    Timeout  Correct Patient: Yes   Correct Procedure: Yes   Correct Site: Yes   Correct Laterality: N/A   Correct Position: Yes   Site Marked: N/A   .   Procedure Documentation    Diagnosis:pain.    Procedure:    Epidural catheter.  Insertion Site:L3-4  (midline approach) Injection technique: LORT saline   Local skin infiltrated with mL of 1% lidocaine.       Patient Prep;mask, sterile gloves, patient draped.  .  Needle: Touhy needle Needle Gauge: 17.    Needle Length (Inches) 3.5  # of attempts: 1 and # of redirects:  .   Catheter: 19 G . .  Catheter threaded easily  4 cm epidural space.  12 cm at skin.   .    Assessment/Narrative  Paresthesias: No.  .  .  Aspiration negative for heme or CSF  . Test dose of 5 mL lidocaine 1.5% w/ 1:200,000 epinephrine at 14:17.  Test dose negative for signs of intravascular, subdural or intrathecal injection. Comments:  VAS pain score prior to epidural:7-8/10    VAS pain score after epidural:2/10    Pt. Tolerated well, FHR stable.
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
## 2019-03-26 NOTE — PLAN OF CARE
Multip 38+5 to 2053 for r/o labor. SVE 6 cm with bulging bag of water. GBS+ antibiotic running. IV bolus started, requesting epidural, procedure explained, permit signed. Dr Lei at bedside. Plan AROM at 1730.

## 2019-03-27 LAB
HGB BLD-MCNC: 12.4 G/DL (ref 11.7–15.7)
T PALLIDUM AB SER QL: NONREACTIVE

## 2019-03-27 PROCEDURE — 25000128 H RX IP 250 OP 636: Performed by: OBSTETRICS & GYNECOLOGY

## 2019-03-27 PROCEDURE — 12000000 ZZH R&B MED SURG/OB

## 2019-03-27 PROCEDURE — 86900 BLOOD TYPING SEROLOGIC ABO: CPT | Performed by: OBSTETRICS & GYNECOLOGY

## 2019-03-27 PROCEDURE — 85018 HEMOGLOBIN: CPT | Performed by: OBSTETRICS & GYNECOLOGY

## 2019-03-27 PROCEDURE — 85461 HEMOGLOBIN FETAL: CPT | Performed by: OBSTETRICS & GYNECOLOGY

## 2019-03-27 PROCEDURE — 25000132 ZZH RX MED GY IP 250 OP 250 PS 637: Performed by: OBSTETRICS & GYNECOLOGY

## 2019-03-27 PROCEDURE — 86901 BLOOD TYPING SEROLOGIC RH(D): CPT | Performed by: OBSTETRICS & GYNECOLOGY

## 2019-03-27 PROCEDURE — 36415 COLL VENOUS BLD VENIPUNCTURE: CPT | Performed by: OBSTETRICS & GYNECOLOGY

## 2019-03-27 RX ADMIN — ACETAMINOPHEN 650 MG: 325 TABLET, FILM COATED ORAL at 15:06

## 2019-03-27 RX ADMIN — IBUPROFEN 800 MG: 800 TABLET ORAL at 07:32

## 2019-03-27 RX ADMIN — ACETAMINOPHEN 650 MG: 325 TABLET, FILM COATED ORAL at 19:41

## 2019-03-27 RX ADMIN — SENNOSIDES AND DOCUSATE SODIUM 1 TABLET: 8.6; 5 TABLET ORAL at 00:55

## 2019-03-27 RX ADMIN — POTASSIUM CHLORIDE 10 MEQ: 750 TABLET, FILM COATED, EXTENDED RELEASE ORAL at 07:33

## 2019-03-27 RX ADMIN — HUMAN RHO(D) IMMUNE GLOBULIN 300 MCG: 300 INJECTION, SOLUTION INTRAMUSCULAR at 19:42

## 2019-03-27 RX ADMIN — SENNOSIDES AND DOCUSATE SODIUM 1 TABLET: 8.6; 5 TABLET ORAL at 07:32

## 2019-03-27 RX ADMIN — SENNOSIDES AND DOCUSATE SODIUM 1 TABLET: 8.6; 5 TABLET ORAL at 19:41

## 2019-03-27 RX ADMIN — IBUPROFEN 800 MG: 800 TABLET ORAL at 00:55
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
## 2019-03-27 NOTE — PLAN OF CARE
Patient arrived back to postpartum unit room 2042 via bed today at 2110  she ia sleepy but easy to awaken  she has been texting since she got back to her room  denies pain.

## 2019-03-27 NOTE — PROGRESS NOTES
Postpartum progress note  3/27/2019     S: Feeling good, got some sleep overnight.  Baby is formula feeding. Pain is much better than yesterday. Lochia minimal, no big gushes. Ambulating without difficulty. Voiding without difficulty. Passing flatus, no BM yet.  Tolerating po intake.     PHYSICAL EXAM:   Vitals:    19 2236 19 0023 19 0058 19 0430   BP: 91/45 (!) 89/45 102/51 93/43   BP Location:       Pulse:  78 74    Resp:       Temp:  99.8  F (37.7  C)  98.4  F (36.9  C)   TempSrc:  Oral  Oral   SpO2: 95%   95%       General: sitting up, alert and cooperative  Abd: soft, non-distended, non-tender. Fundus firm, nontender, 1 cm below umbilicus.   Extremities: calves nontender, 1+ edema of lower extremities bilaterally      Lab Results   Component Value Date    HGB 12.4 2019    HGB 11.6 2019     INR 1.06  plts 224  hgb 13.0 > 11.6 > 12.4    Blood type:   Lab Results   Component Value Date    RH Neg 2019    RH Neg 2019         ASSESSMENT: 28 year old  PPD 1 s/p , POD#1 s/p manual extraction and curettage for retained placenta.     PLAN:  - Heme: hgb 13.0 >  > 11.6 > 12.4, no s/s ABLA.  S/p TXA yesterday after surgery  - Feeding: formula  - Birth control: desires IUD   - Rh status: neg, baby RH pos, Rhogam ordered prior to discharge   - Rubella status: immune  - Dispo: home tomorrow    Savi Smart MD, MPH  [facility]

## 2019-03-27 NOTE — PLAN OF CARE
Data: Vital signs within normal limits. Postpartum checks within normal limits - see flow record. Patient eating and drinking normally. Patient able to empty bladder independently. Patient ambulating independently. No apparent signs of infection. Perineum healing well. Patient is performing self cares and is able to care for infant. Positive attachment behaviors are observed with infant. Support persons are not  present.  Action:  Pain plan was discussed. Patient will request pain med when she is ready for it. Patient was medicated during the shift for pain. See MAR. Patient education done about formula feeding,  cares, postpartum cares, pain management/plan, fall risk,  safety and rest. See flow record.  Response: Patient reassessed within 1 hour after each medication for pain. Patient stated that pain had improved. Patient stated that she was comfortable.  Plan: Anticipate discharge on 3/28/19.

## 2019-03-27 NOTE — ANESTHESIA POSTPROCEDURE EVALUATION
Patient: Mora Reardon    Procedure(s):  PRUDENCIO CURETTAGE WITH REMOVAL OF PLACENTA UNDER ULTRASOUND GUIDANCE    Diagnosis:retained placenta  Diagnosis Additional Information: No value filed.    Anesthesia Type:  General, ETT, MAC    Note:  Anesthesia Post Evaluation    Patient location during evaluation: Phase 2 and Bedside  Patient participation: Able to fully participate in evaluation  Level of consciousness: awake and alert  Pain management: adequate  Airway patency: patent  Cardiovascular status: acceptable  Respiratory status: acceptable  Hydration status: acceptable  PONV: none     Anesthetic complications: None          Last vitals:  Vitals:    03/26/19 2045 03/26/19 2100 03/26/19 2110   BP: 127/71 120/69    Pulse: 91 77    Resp: 18 22    Temp:      SpO2: 98% 95% 95%         Electronically Signed By: Reece Mtz CRNA, APRN CRNA  March 26, 2019  9:16 PM

## 2019-03-27 NOTE — PLAN OF CARE
No Change  Infection (Postpartum Vaginal Delivery)  Absence of Infection Signs/Symptoms  3/27/2019 1755 - No Change by Rhoda Grant, RN  Note  Pt vss, afebrile. Small amt vaginal bleeding.  Indep with self & infant cares.  Pt stable.   3/27/2019 0648 - Improving by Suzanne Martino RN  Pain (Postpartum Vaginal Delivery)  Acceptable Pain Control  3/27/2019 1755 - No Change by Rhoda Grant, RN  Note  Pt taking Ibup & Tylenol prn discomfort.   3/27/2019 0648 - Improving by Suzanne Martino RN

## 2019-03-27 NOTE — PLAN OF CARE
Patient delivered a baby girl today at 1839  her perineum is intact but had to go to the OR for a retained placenta  she went to the OR at 1905 via bed  report to surgery team.

## 2019-03-27 NOTE — OP NOTE
Operative Note    Patient: Mora Reardon  : 1990  MRN: [id number]    Date of Service: 3/26/2019    Pre-operative diagnosis:  Retained placenta, concern for accreta    Post-operative diagnosis:  Successful removal of placenta    Procedure:   Manual curettage  Removal of placenta  With ultrasound guidance    Surgeon: Savi Smart MD  Assistant: Abhinav New MD     Anesthesia: General    EBL: 550cc  Urine: 50cc  Fluids: see anesthesia record    Specimens: placenta  Complications: none apparent    Findings: once under general anesthesia, the anterior placenta was still tightly adherent to the anterior wall even with vigorous cord traction.  Once removed, the ultrasound showed an empty uterus which was already beginning to involute siginificantly    Indications: Mora Reardon is a 28 year old female who delivered a female infant and subsequently the placenta was densely adherent to the anterior uterine wall.  After cord traction, IV fentanyl and stopping pitocin, and attempt at manual extraction in the delivery room, moving to the operating room for anesthesia was recommended.  Discussed removal of placenta, with concern for placenta accreta and the possibility of blood transfusion and/or hysterectomy to adequately control bleeding.  Discussed risks, benefits, and alternatives to the procedure including risk of infection, bleeding, damage to local organs, blood clots. The patient's questions were answered, understanding confirmed, and the patient signed written informed consent.    Procedure: The patient was taken to the operating room where she underwent anesthesia and was prepped and draped in the usual sterile fashion in dorsal lithotomy position.  A time-out was performed.  A newton was placed.  A single hand with copious lubrication was inserted into the vagina up to the level of the cord insertion which was noted to be an anterior placenta.  Counter traction downward on the uterine fundus  with the other hand externally was applied.  A plane was made between the placenta and the anterior uterine wall,  the two.  The placenta slowly was completely dissected away and removed.  It was inspected and appeared to be intact.  The ultrasound was placed on the abdomen and the uterus was noted to be empty with one small ridge of tissue.  A repeat curettage using one laparotomy sponge for traction was done and blood clots but no evidence of further placenta came out.  The uterus was given vigorous bimanual massage.   Tranexamic acid was given.  Pitocin was restarted.  The newton was removed.       Instrument, needle, and sponge counts were correct times 2.  The patient was extubated in the OR and the patient was transferred to the PACU in stable condition.    The assistance of the co-surgeon was required due to the need for additional skilled surgical hands to retract and hold instruments due to the nature of the surgical procedure and risk of complications.    Savi Smart MD, MPH  [facility]

## 2019-03-27 NOTE — L&D DELIVERY NOTE
Delivery Summary    28 year old  at 38w5d presented in labor.  GBS pos, got 4H of clindamycin.  AROM of clear fluid.  Epidural for pain control.  Uncomfortable with anterior lip, pushed past the lip and delivered a vigorous female infant with apgars 8 and 9.  Delayed cord clamping.  Perineum intact.  Placenta retained, with vigorous traction, would not give.  Stopped pitocin, gave 50 mcg fentanyl and did a uterine sweep, placenta is adherent to the anterior wall and patient too uncomfortable for further bedside maneuvers.  Consented for removal of the placenta in the operating room under anesthesia with possible hysterectomy.  She is consented for blood products if needed.  Signed written consent form.    Savi Smart MD, MPH  [facility]         Labor Event Times    Dilation complete date:  3/26/19    Start pushing date/time:  3/26/2019 1911      Labor Events     labor?:  No   steroids:  None  Labor Type:  Spontaneous  Predominate monitoring during 1st stage:  continuous electronic fetal monitoring     Antibiotics received during labor?:  Yes  Reason for Antibiotics:  GBS  Antibiotics received for GBS:  Penicillin  Antibiotics Given (GBS):  Greater than 4 hours prior to delivery     Rupture date/time: 3/26/19    Rupture type:  Artificial Rupture of Membranes  Fluid color:  Clear  Fluid odor:  Normal     1:1 continuous labor support provided by?:  RN Labor partogram used?:  yes      Delivery/Placenta Date and Time    Delivery Date:  3/26/19 Delivery Time:   6:39 PM   Oxytocin given at the time of delivery:  after delivery of baby     Vaginal Counts     Initial count performed by 2 team members:   Two Team Members   Mane Alexander       Needles Suture [place] Sponges Instruments   Initial counts 2  5    Added to count  1     Final counts 2 1 5    Placed during labor Accounted for at the end of labor   No    No    No     Final count performed by 2 team members:   Two Team Members  "Bing Danielson      Final count correct?:  Yes     Apgars    Living status:  Living   1 Minute 5 Minute 10 Minute 15 Minute 20 Minute   Skin color: 0  1       Heart rate: 2  2       Reflex irritability: 2  2       Muscle tone: 2  2       Respiratory effort: 2  2       Total: 8  9       Apgars assigned by:  HAYLEY     Cord    Vessels:  3 Vessels Complications:  None   Cord Blood Disposition:  Lab Gases Sent?:  No      [place] Resuscitation    Methods:  None  Output in Delivery Room:  Voided      Measurements    Weight:  6 lb 13 oz Length:  1' 8\"   Head circumference:  33 cm       Skin to Skin and Feeding Plan    Skin to skin initiation date/time:     Skin to skin end date/time:     Reason skin to skin not initiated:  Maternal Acuity  How do you plan to feed your baby:  Formula     Labor Events and Shoulder Dystocia    Shoulder dystocia present?:  Neg     Delivery (Maternal) (Provider to Complete) (876301)    Episiotomy:  None  Perineal lacerations:  None    Vaginal laceration?:  No    Cervical laceration?:  No       Blood Loss  Mother: Mora Reardon #[id number]   Start of Mother's Information    IO Blood Loss  19 0639 - 190    EBL (mL) Anesthesia 550 mL    Total  550 mL         End of Mother's Information  Mother: Mora Reardon #[id number]         Delivery - Provider to Complete (100181)    Delivering clinician:  Savi Smart MD  Attempted Delivery Types (Choose all that apply):  Spontaneous Vaginal Delivery  Delivery Type (Choose the 1 that will go to the Birth History):  Vaginal, Spontaneous   Other personnel:   Provider Role   Liz Bar RN Wilkie, Cindy, RN          Placenta    Delayed Cord Clamping:  Done  Removal:  Curettage  Comments:  retained.  D&C in OR  Disposition:  Pathology     Anesthesia    Method:  Epidural, INTRAVENOUS REGIONAL          Presentation and Position    Presentation:  Vertex  Position:  Right Occiput Anterior           Savi Smart" MD

## 2019-03-28 VITALS
TEMPERATURE: 97.8 F | RESPIRATION RATE: 16 BRPM | SYSTOLIC BLOOD PRESSURE: 103 MMHG | OXYGEN SATURATION: 97 % | DIASTOLIC BLOOD PRESSURE: 60 MMHG | HEART RATE: 76 BPM

## 2019-03-28 LAB
ABO + RH BLD: NORMAL
ABO + RH BLD: NORMAL
BLOOD BANK CMNT PATIENT-IMP: NORMAL
DATE RH IMM GL GVN: NORMAL
FETAL CELL SCN BLD QL ROSETTE: NORMAL
RH IG VIALS RECOM PATIENT: NORMAL

## 2019-03-28 PROCEDURE — 25000132 ZZH RX MED GY IP 250 OP 250 PS 637: Performed by: OBSTETRICS & GYNECOLOGY

## 2019-03-28 RX ADMIN — ACETAMINOPHEN 650 MG: 325 TABLET, FILM COATED ORAL at 04:05

## 2019-03-28 RX ADMIN — POTASSIUM CHLORIDE 10 MEQ: 750 TABLET, FILM COATED, EXTENDED RELEASE ORAL at 08:29

## 2019-03-28 RX ADMIN — IBUPROFEN 800 MG: 800 TABLET ORAL at 04:05

## 2019-03-28 RX ADMIN — ACETAMINOPHEN 650 MG: 325 TABLET, FILM COATED ORAL at 08:28

## 2019-03-28 RX ADMIN — ACETAMINOPHEN 650 MG: 325 TABLET, FILM COATED ORAL at 13:51

## 2019-03-28 RX ADMIN — SENNOSIDES AND DOCUSATE SODIUM 1 TABLET: 8.6; 5 TABLET ORAL at 08:30

## 2019-03-28 NOTE — DISCHARGE SUMMARY
[facility] Discharge Summary    Mora Reardon MRN# [id number]   Age: 28 year old YOB: 1990     Date of Admission:  3/26/2019  Date of Discharge::  3/28/2019  Admitting Physician:  Savi Smart MD  Discharge Physician:  Abhinav New MD     Home clinic: [facility]          Admission Diagnoses:   pregnancy  Prenatal care, subsequent pregnancy  Encounter for triage in pregnant patient   (spontaneous vaginal delivery)          Discharge Diagnosis:   Normal spontaneous vaginal delivery  Intrauterine pregnancy at 38+5 weeks gestation          Procedures:   Procedure(s): Manual removal of placenta                  Medications Prior to Admission:     Medications Prior to Admission   Medication Sig Dispense Refill Last Dose     potassium chloride ER (K-DUR/KLOR-CON M) 10 MEQ CR tablet Take 1 tablet (10 mEq) by mouth daily Until delivery 30 tablet 0 3/25/2019 at am     Prenatal Vit-Fe Fumarate-FA (PRENATAL MULTIVITAMIN PLUS IRON) 27-0.8 MG TABS per tablet Take 1 tablet by mouth daily   3/25/2019 at am             Discharge Medications:     Current Discharge Medication List      CONTINUE these medications which have NOT CHANGED    Details   potassium chloride ER (K-DUR/KLOR-CON M) 10 MEQ CR tablet Take 1 tablet (10 mEq) by mouth daily Until delivery  Qty: 30 tablet, Refills: 0    Associated Diagnoses: Potassium deficiency      Prenatal Vit-Fe Fumarate-FA (PRENATAL MULTIVITAMIN PLUS IRON) 27-0.8 MG TABS per tablet Take 1 tablet by mouth daily                   Consultations:   No consultations were requested during this admission          Brief History of Labor:   28 year old  at 38w5d presented in labor.  GBS pos, got 4H of clindamycin.  AROM of clear fluid.  Epidural for pain control.  Uncomfortable with anterior lip, pushed past the lip and delivered a vigorous female infant with apgars 8 and 9.  Delayed cord clamping.  Perineum intact.  Placenta retained, with  "vigorous traction, would not give.  Stopped pitocin, gave 50 mcg fentanyl and did a uterine sweep, placenta is adherent to the anterior wall and patient too uncomfortable for further bedside maneuvers.  Consented for removal of the placenta in the operating room under anesthesia with possible hysterectomy.  She is consented for blood products if needed.  Signed written consent form.  Placenta delivered in the OR under General anesthesia  \"Stormi Reign\"           Hospital Course:   The patient's hospital course was unremarkable.  On discharge, her pain was well controlled. Vaginal bleeding is similar to peak menstrual flow.  Voiding without difficulty.  Ambulating well and tolerating a normal diet.  No fever.  Bottle feeding well.  Infant is stable.  No bowel movement yet.*  She was discharged on post-partum day #2.  /60   Pulse 76   Temp 97.8  F (36.6  C) (Oral)   Resp 16   LMP  (LMP Unknown)   SpO2 97%   Breastfeeding? Unknown   Exam:  Constitutional: healthy, alert and no distress  Gastrointestinal: Abdomen soft, non-tender. BS normal.FF@U, nontender No masses, organomegaly  : Deferred  Musculoskeletal: extremities normal- no gross deformities noted, gait normal and normal muscle tone,  no cords  Skin: no suspicious lesions or rashes  Neurologic: Gait normal. Reflexes normal and symmetric. Sensation grossly WNL.  Psychiatric: mentation appears normal and affect normal/bright      Post-partum hemoglobin:   Hemoglobin   Date Value Ref Range Status   03/27/2019 12.4 11.7 - 15.7 g/dL Final             Discharge Instructions and Follow-Up:   Discharge diet: Regular   Discharge activity: Pelvic rest: abstain from intercourse and do not use tampons for 6 week(s)   Discharge follow-up: Follow up with Dr. Aquino in 2 weeks for Mirena IUD   Wound care: Drink plenty of fluids  Ice to area for comfort           Discharge Disposition:   Discharged to home      Attestation:  Amount of time performed on this " discharge summary: 7 minutes.    Abhinav New MD

## 2019-03-28 NOTE — PLAN OF CARE
Discharge instructions reviewed patient said she will follow up as recommended.  Discharged at 1500  awaiting babys discharge

## 2019-03-28 NOTE — PLAN OF CARE
Data: Vital signs within normal limits. Postpartum checks within normal limits - see flow record. Patient eating and drinking normally. Patient able to empty bladder independently. . Patient ambulating independently..   No apparent signs of infection. perineum healing well. Patient Is performing self cares and Is able to care for infant. Positive attachment behaviors are observed with infant. Support persons are present.  Action:  Pain plan was discussed. Patient will request pain med when she is ready for it. Patient was medicated during the shift for cramping. See MAR.Patient education done about  cares. See flow record.  Response:   Patient reassessed within 1 hour after each medication for pain. Patient stated that pain had improved. Patient stated that she was comfortable. .   Plan: Anticipate discharge on 3/28.

## 2019-03-28 NOTE — PROGRESS NOTES
"SW note. This writer met with pt regarding discharge planning. Pt reports she has all the resources she needs in the community, she is moving \"up here\" and has been working with [facility] to get everything transferred from [facility]. Pt reports she has 3 other children at home. She states her mother in law will be coming to pick her up.     CTS to follow for cord blood following pts discharge.     No other SW needs at this time. Shawna LEMUS, [facility], [facility] [phone]    "

## 2019-03-28 NOTE — DISCHARGE INSTRUCTIONS
Postpartum Vaginal Delivery Instructions    Activity       Ask family and friends for help when you need it.    Do not place anything in your vagina for 6 weeks.    You are not restricted on other activities, but take it easy for a few weeks to allow your body to recover from delivery.  You are able to do any activities you feel up to that point.    No driving until you have stopped taking your pain medications (usually two weeks after delivery).     Call your health care provider if you have any of these symptoms:       Increased pain, swelling, redness, or fluid around your stiches from an episiotomy or perineal tear.    A fever above 100.4 F (38 C) with or without chills when placing a thermometer under your tongue.    You soak a sanitary pad with blood within 1 hour, or you see blood clots larger than a golf ball.    Bleeding that lasts more than 6 weeks.    Vaginal discharge that smells bad.    Severe pain, cramping or tenderness in your lower belly area.    A need to urinate more frequently (use the toilet more often), more urgently (use the toilet very quickly), or it burns when you urinate.    Nausea and vomiting.    Redness, swelling or pain around a vein in your leg.    Problems breastfeeding or a red or painful area on your breast.    Chest pain and cough or are gasping for air.    Problems coping with sadness, anxiety, or depression.  If you have any concerns about hurting yourself or the baby, call your provider immediately.     You have questions or concerns after you return home.                   Discharge Instructions and Follow-Up:    Discharge diet: Regular   Discharge activity: Pelvic rest: abstain from intercourse and do not use tampons for 6 week(s)   Discharge follow-up: Follow up with Dr. Aquino in 2 weeks for Mirena IUD   Wound care: Drink plenty of fluids  Ice to area for comfort         Always wash your hands before touching your perineal area and stitches.  This helps reduce your  risk of infection.  If your hands aren't dirty, you may use an alcohol hand-rub to clean your hands. Keep your nails clean and short.

## 2019-03-29 LAB
BLD PROD TYP BPU: NORMAL
BLD PROD TYP BPU: NORMAL
BLD UNIT ID BPU: 0
BLD UNIT ID BPU: 0
BLOOD PRODUCT CODE: NORMAL
BLOOD PRODUCT CODE: NORMAL
BPU ID: NORMAL
BPU ID: NORMAL
COPATH REPORT: NORMAL
TRANSFUSION STATUS PATIENT QL: NORMAL

## 2019-04-11 ENCOUNTER — TELEPHONE (OUTPATIENT)
Dept: OBGYN | Facility: CLINIC | Age: 29
End: 2019-04-11

## 2019-04-11 NOTE — TELEPHONE ENCOUNTER
Return call to patient.  Spoke with patient on the phone.  Patient would like an IUD placed tomorrow. Patient reports was told at her delivery by Dr. Aquino that she could have an IUD placed at 2 weeks postpartum and not wait until her 6 week visit.     She is going to be in town tomorrow and would like an appointment for this. Patient is only interested in going to [facility] as that is where she will be going.  Unknown to writer if appointment is available tomorrow in [place].  Patient advised to call to [facility] to check with schedulers and providers to see if an IUD could be placed. Patient reports she has the phone number to call. Offered patient appointment here in [place] with Dr. Auqino if patient is able to come next week. Dr. Aquino is not in clinic tomorrow.    Pt in agreement and reports understanding.    [facility] Arguello   Ob[facility]Gyn Clinic  RN

## 2019-04-11 NOTE — TELEPHONE ENCOUNTER
Pt delivered 3/26/19 and wants to know if she can get an IUD placed tomorrow at the [facility] by a FP MD?  Pt said Dr. Paula Smart told her she could, she just wants to verify.    Please advise -    Deanna Mcmullen  [facility]

## 2019-04-29 ENCOUNTER — RESULT FOLLOW UP (OUTPATIENT)
Dept: OBGYN | Facility: CLINIC | Age: 29
End: 2019-04-29

## 2019-04-29 ENCOUNTER — OFFICE VISIT (OUTPATIENT)
Dept: OBGYN | Facility: CLINIC | Age: 29
End: 2019-04-29
Payer: MEDICAID

## 2019-04-29 VITALS
TEMPERATURE: 98.6 F | HEART RATE: 66 BPM | WEIGHT: 158.5 LBS | BODY MASS INDEX: 28.08 KG/M2 | RESPIRATION RATE: 16 BRPM | DIASTOLIC BLOOD PRESSURE: 54 MMHG | SYSTOLIC BLOOD PRESSURE: 88 MMHG | HEIGHT: 63 IN

## 2019-04-29 DIAGNOSIS — Z30.430 ENCOUNTER FOR INSERTION OF MIRENA IUD: ICD-10-CM

## 2019-04-29 DIAGNOSIS — L02.412 CUTANEOUS ABSCESS OF LEFT AXILLA: ICD-10-CM

## 2019-04-29 DIAGNOSIS — Z30.430 ENCOUNTER FOR INSERTION OF INTRAUTERINE CONTRACEPTIVE DEVICE: ICD-10-CM

## 2019-04-29 DIAGNOSIS — R87.810 CERVICAL HIGH RISK HPV (HUMAN PAPILLOMAVIRUS) TEST POSITIVE: ICD-10-CM

## 2019-04-29 PROCEDURE — 99207 ZZC POST PARTUM EXAM: CPT | Performed by: OBSTETRICS & GYNECOLOGY

## 2019-04-29 PROCEDURE — 87624 HPV HI-RISK TYP POOLED RSLT: CPT | Performed by: OBSTETRICS & GYNECOLOGY

## 2019-04-29 PROCEDURE — 58300 INSERT INTRAUTERINE DEVICE: CPT | Performed by: OBSTETRICS & GYNECOLOGY

## 2019-04-29 PROCEDURE — 99213 OFFICE O/P EST LOW 20 MIN: CPT | Mod: 24 | Performed by: OBSTETRICS & GYNECOLOGY

## 2019-04-29 RX ORDER — SULFAMETHOXAZOLE/TRIMETHOPRIM 800-160 MG
1 TABLET ORAL 2 TIMES DAILY
Qty: 14 TABLET | Refills: 0 | Status: SHIPPED | OUTPATIENT
Start: 2019-04-29 | End: 2019-05-06

## 2019-04-29 ASSESSMENT — MIFFLIN-ST. JEOR: SCORE: 1400.14

## 2019-04-29 NOTE — LETTER
May 7, 2019      Mora IRIS Reardon  [address] [place] [id number]    Dear ,      This letter is in regards to the PAP smear and HPV (Human Papillomavirus) test you had done recently. Your PAP test result is normal, but your HPV (Human Papillomavirus) test was positive.     About 80 percent of women have been exposed to HPV virus throughout their lifetime. There is no medication for the treatment of HPV. Typically your own immune system gets rid of the virus before it does harm. HPV is spread by direct skin-to-skin contact, including sexual intercourse, oral sex, anal sex, or any other contact involving the genital area (example: hand to genital contact). It is not possible to become infected with HPV by touching an object, such as a toilet seat. Most people who are infected with HPV have no signs or symptoms.    Things that you can do to boost your immune system and help your body get rid of HPV: get plenty of rest, eat a well-balanced diet of healthy foods, stop smoking, exercise regularly and decrease stress.    Please return in 1 year to repeat your pap smear and HPV test.     If you have additional questions regarding this result, please call our registered nurse, Janet at [phone].    Sincerely,      Star Mancilla MD/shaquille

## 2019-04-29 NOTE — LETTER
September 25, 2020      Mora Reardon  [address] [facility] [id number]        Dear ,    At [facility], your health and wellness are our primary concern. That is why we are following up on your most recent positive high-risk Human Papillomavirus (HPV) test.    Please call [phone] to schedule an appointment for your recommended follow-up Pap smear and Human Papillomavirus (HPV) test at your earliest convenience.     If you have completed the appointment outside of the [facility] system, please have the records forwarded to our office. We will update your chart for your provider to review before your next annual wellness visit.     Thank you for choosing [facility]!      Sincerely,    Your [facility] Care Team/[facility]

## 2019-04-29 NOTE — PROGRESS NOTES
"6 week Postpartum Visit Note    S:  Mora Reardon is here for her 6-week postpartum checkup. Reports a bump on her left armpit that is painful and would like to have it assessed.     Delivery Date: 3/26/2019.    Delivering provider:  Savi Smart MD    Type of delivery:  .  Perineum:  Intact; delivery complicated by postpartum hemorrhage status post dilation and curettage for suspected retained placenta.     Infant gender:  girl, weight 6 pounds 13 oz.  Feeding Method:  Bottlefed.  Complications reported with feeding:  none, infant thriving .    Bleeding:  None. .  Menses resumed:  No  Bowel/Urinary problems:  No      Last Pap smear: NiL 2016    Contraception Planned:  IUD Mirena  She  has not had intercourse since delivery..    Current tobacco use:  Yes   Hx of Abuse:  No  ================================================================  ROS: 10 point ROS neg other than the symptoms noted above in the HPI.     O:  EXAM:  BP (!) 88/54 (BP Location: Right arm, Patient Position: Chair, Cuff Size: Adult Regular)   Pulse 66   Temp 98.6  F (37  C) (Tympanic)   Resp 16   Ht 1.588 m (5' 2.5\")   Wt 71.9 kg (158 lb 8 oz)   LMP  (LMP Unknown)   Breastfeeding? No   BMI 28.53 kg/m      General: healthy, alert and no distress  Psych: negative for sleep disturbance, anxiety, nervous breakdown, depression, thoughts of self-harm, thoughts of hurting someone else, agitation and hallucinations  Breasts:  Lactating, Nipples intact with no lesions, Non-tender and No S/S of yeast or mastitis  Abdomen: Soft, flat, non-tender  Ext: Left axilla inspected and remarkable for a 2-3 cm fluctuant mass tender to palpation with surrounding erthema   Incision:  None   Vulva:  Normal genitalia and Bartholin's, Urethra, Jacumba's normal  Vagina:  normal with good muscle tone, without discharge and rugated  Cervix:  Multiparous,, no lesions and pink, moist, closed, without lesion or CMT.    Uterus:  fully involuted and " non-tender    Adnexa:  No masses, nodularity, tenderness  Recto-vaginal:   anus normal    Procedure: IUD insertion  After informed consent was obtained from the patient, a speculum was placed in the vagina to visualized the cervix.  The cervix was then swabbed with a betadine prep .  Tenaculum was placed at the 12 o'clock position on the cervix and the uterus sounded to 10 cm.  The Mirena  IUD was then placed in the usual fashion under sterile technique.  Strings were clipped about 2 cm from the cervical os.  Tenaculum was removed and cervix was hemostatic. There were no complications. The patient tolerated the procedure well.      A:   28 year old  s/p  here for 6 weeks  postpartum visit. Doing well    P:  Contraception: Mirena IUD insertion  The patient should feel for the IUD strings after her next menses.  If unable to locate them, she should return to clinic for a speculum examination for confirmation that the IUD is in place. Bleeding pattern of this particular IUD was discussed with the patient. She is aware that the IUD will need to be removed in 5 years or PRN.  She is to return to clinic for her next annual or PRN.  Axillary cutaneous abscess: Bactrim prescribed although she was recommended to have it incised and drained. Patient defers incision and drainage at this time, and will return to urgent care/PCP for incision and drainage if necessary. Bactrim DS dosing x 7 days prescribed.   Feeding: Bottle  Return in 4 weeks to reassess IUD strings      Star Mancilla MD  [facility]

## 2019-04-29 NOTE — NURSING NOTE
"Initial BP (!) 88/54 (BP Location: Right arm, Patient Position: Chair, Cuff Size: Adult Regular)   Pulse 66   Temp 98.6  F (37  C) (Tympanic)   Resp 16   Ht 1.588 m (5' 2.5\")   Wt 71.9 kg (158 lb 8 oz)   LMP  (LMP Unknown)   Breastfeeding? No   BMI 28.53 kg/m   Estimated body mass index is 28.53 kg/m  as calculated from the following:    Height as of this encounter: 1.588 m (5' 2.5\").    Weight as of this encounter: 71.9 kg (158 lb 8 oz). .    Mora Sweeney, JOI  "

## 2019-04-30 PROCEDURE — G0145 SCR C/V CYTO,THINLAYER,RESCR: HCPCS | Performed by: OBSTETRICS & GYNECOLOGY

## 2019-05-06 LAB
COPATH REPORT: NORMAL
PAP: NORMAL

## 2019-05-07 LAB
FINAL DIAGNOSIS: ABNORMAL
HPV HR 12 DNA CVX QL NAA+PROBE: POSITIVE
HPV16 DNA SPEC QL NAA+PROBE: NEGATIVE
HPV18 DNA SPEC QL NAA+PROBE: NEGATIVE
SPECIMEN DESCRIPTION: ABNORMAL
SPECIMEN SOURCE CVX/VAG CYTO: ABNORMAL

## 2019-05-07 NOTE — PROGRESS NOTES
4/29/19 NIL Pap, + HR HPV (Neg 16/18). Plan cotest in 1 year.   5/7/19 Mailbox is full. Unable to leave a message.   5/7/19 TC sent pt a result letter.

## 2019-06-18 ENCOUNTER — OFFICE VISIT (OUTPATIENT)
Dept: URGENT CARE | Facility: URGENT CARE | Age: 29
End: 2019-06-18
Payer: MEDICAID

## 2019-06-18 VITALS
WEIGHT: 134 LBS | OXYGEN SATURATION: 98 % | BODY MASS INDEX: 26.31 KG/M2 | TEMPERATURE: 97.9 F | HEART RATE: 79 BPM | RESPIRATION RATE: 14 BRPM | DIASTOLIC BLOOD PRESSURE: 64 MMHG | HEIGHT: 60 IN | SYSTOLIC BLOOD PRESSURE: 110 MMHG

## 2019-06-18 DIAGNOSIS — L03.119 CELLULITIS AND ABSCESS OF UPPER EXTREMITY: Primary | ICD-10-CM

## 2019-06-18 DIAGNOSIS — L02.419 CELLULITIS AND ABSCESS OF UPPER EXTREMITY: Primary | ICD-10-CM

## 2019-06-18 PROCEDURE — 99213 OFFICE O/P EST LOW 20 MIN: CPT | Performed by: PHYSICIAN ASSISTANT

## 2019-06-18 RX ORDER — SULFAMETHOXAZOLE/TRIMETHOPRIM 800-160 MG
1 TABLET ORAL 2 TIMES DAILY
Qty: 20 TABLET | Refills: 0 | Status: SHIPPED | OUTPATIENT
Start: 2019-06-18 | End: 2019-06-28

## 2019-06-18 ASSESSMENT — ENCOUNTER SYMPTOMS
VOMITING: 0
CHEST TIGHTNESS: 0
UNEXPECTED WEIGHT CHANGE: 0
BACK PAIN: 0
ABDOMINAL PAIN: 0
SINUS PRESSURE: 0
ROS SKIN COMMENTS: SKIN INFECTION
SHORTNESS OF BREATH: 0
WHEEZING: 0
SORE THROAT: 0
EYE PAIN: 0
EYE REDNESS: 0
TROUBLE SWALLOWING: 0
CHILLS: 0
COUGH: 0
RHINORRHEA: 0
ARTHRALGIAS: 0
NAUSEA: 0
PALPITATIONS: 0
FEVER: 0
FATIGUE: 0
DIARRHEA: 0
MYALGIAS: 0

## 2019-06-18 ASSESSMENT — MIFFLIN-ST. JEOR: SCORE: 1253.29

## 2019-06-18 NOTE — NURSING NOTE
"Chief Complaint   Patient presents with     Mass     Has 2 masses under left arm.  One has pooped and keeps draining.  Started about 2 months improved and then came back about 8 days ago.        Initial /64 (BP Location: Right arm, Patient Position: Chair, Cuff Size: Adult Regular)   Pulse 79   Temp 97.9  F (36.6  C) (Tympanic)   Resp 14   Ht 1.53 m (5' 0.25\")   Wt 60.8 kg (134 lb)   LMP  (LMP Unknown)   SpO2 98%   BMI 25.95 kg/m   Estimated body mass index is 25.95 kg/m  as calculated from the following:    Height as of this encounter: 1.53 m (5' 0.25\").    Weight as of this encounter: 60.8 kg (134 lb).    Patient presents to the clinic using No DME    Health Maintenance that is potentially due pending provider review:  NONE    n/a    Is there anyone who you would like to be able to receive your results? No  If yes have patient fill out LAURO  Viviana Parikh M.A.        "
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
## 2019-10-19 ENCOUNTER — OFFICE VISIT - HEALTHEAST (OUTPATIENT)
Dept: FAMILY MEDICINE | Facility: CLINIC | Age: 29
End: 2019-10-19

## 2019-10-19 DIAGNOSIS — L08.9 INFECTED SEBACEOUS CYST: ICD-10-CM

## 2019-10-19 DIAGNOSIS — L72.3 INFECTED SEBACEOUS CYST: ICD-10-CM

## 2019-10-19 ASSESSMENT — MIFFLIN-ST. JEOR: SCORE: 1212.1

## 2019-10-22 LAB — BACTERIA SPEC CULT: ABNORMAL

## 2020-08-03 ENCOUNTER — OFFICE VISIT (OUTPATIENT)
Dept: FAMILY MEDICINE | Facility: CLINIC | Age: 30
End: 2020-08-03
Payer: COMMERCIAL

## 2020-08-03 VITALS
WEIGHT: 164 LBS | BODY MASS INDEX: 32.2 KG/M2 | RESPIRATION RATE: 18 BRPM | SYSTOLIC BLOOD PRESSURE: 100 MMHG | DIASTOLIC BLOOD PRESSURE: 62 MMHG | HEIGHT: 60 IN | HEART RATE: 64 BPM | TEMPERATURE: 98.7 F

## 2020-08-03 DIAGNOSIS — H66.002 ACUTE SUPPURATIVE OTITIS MEDIA OF LEFT EAR WITHOUT SPONTANEOUS RUPTURE OF TYMPANIC MEMBRANE, RECURRENCE NOT SPECIFIED: Primary | ICD-10-CM

## 2020-08-03 PROCEDURE — 99213 OFFICE O/P EST LOW 20 MIN: CPT | Performed by: FAMILY MEDICINE

## 2020-08-03 RX ORDER — AMOXICILLIN 500 MG/1
500 CAPSULE ORAL 3 TIMES DAILY
Qty: 30 CAPSULE | Refills: 0 | Status: SHIPPED | OUTPATIENT
Start: 2020-08-03 | End: 2020-08-13

## 2020-08-03 ASSESSMENT — MIFFLIN-ST. JEOR: SCORE: 1384.37

## 2020-08-03 NOTE — NURSING NOTE
"Chief Complaint   Patient presents with     Ear Problem     /62 (Cuff Size: Adult Regular)   Pulse 64   Temp 98.7  F (37.1  C) (Tympanic)   Resp 18   Ht 1.53 m (5' 0.25\")   Wt 74.4 kg (164 lb)   Breastfeeding No   BMI 31.76 kg/m   Estimated body mass index is 31.76 kg/m  as calculated from the following:    Height as of this encounter: 1.53 m (5' 0.25\").    Weight as of this encounter: 74.4 kg (164 lb).  Patient presents to the clinic using No DME      Health Maintenance that is potentially due pending provider review:    Health Maintenance Due   Topic Date Due     PREVENTIVE CARE VISIT  05/28/2010     PHQ-2  01/01/2020                "

## 2020-08-03 NOTE — PATIENT INSTRUCTIONS
Patient Education     Middle Ear Infection (Adult)  You have an infection of the middle ear, the space behind the eardrum. This is also called acute otitis media (AOM). Sometimes it is caused by the common cold. This is because congestion can block the internal passage (eustachian tube) that drains fluid from the middle ear. When the middle ear fills with fluid, bacteria can grow there and cause an infection. Oral antibiotics are used to treat this illness, not ear drops. Symptoms usually start to improve within 1 to 2 days of treatment.    Home care  The following are general care guidelines:    Finish all of the antibiotic medicine given, even though you may feel better after the first few days.    You may use over-the-counter medicine, such as acetaminophen or ibuprofen, to control pain and fever, unless something else was prescribed. If you have chronic liver or kidney disease or have ever had a stomach ulcer or gastrointestinal bleeding, talk with your healthcare provider before using these medicines. Do not give aspirin to anyone under 18 years of age who has a fever. It may cause severe illness or death.  Follow-up care  Follow up with your healthcare provider, or as advised, in 2 weeks if all symptoms have not gotten better, or if hearing doesn't go back to normal within 1 month.  When to seek medical advice  Call your healthcare provider right away if any of these occur:    Ear pain gets worse or does not improve after 3 days of treatment    Unusual drowsiness or confusion    Neck pain, stiff neck, or headache    Fluid or blood draining from the ear canal    Fever of 100.4 F (38 C) or as advised     Seizure  Date Last Reviewed: 6/1/2016 2000-2019 [employer]. [address] [place] [address], [place] [address]. All rights reserved. This information is not intended as a substitute for professional medical care. Always follow your healthcare professional's instructions.

## 2020-08-03 NOTE — PROGRESS NOTES
SUBJECTIVE   Mora Reardon is a 29 year old female who presents with     Ear pain      Duration: Yesterday     Description (location/character/radiation): left ear     Intensity:  moderate    Accompanying signs and symptoms: pain, worse at night when she lays down     History (similar episodes/previous evaluation): History of ear infections     Precipitating or alleviating factors: None    Therapies tried and outcome: heating pad        PCP   ADELAIDE Hinojosa [facility] [phone]    Health Maintenance        Health Maintenance Due   Topic Date Due     PREVENTIVE CARE VISIT  2010     PHQ-2  2020       HPI        Patient Active Problem List   Diagnosis     Bipolar disorder (H)     Constipation     Bartter's syndrome     Patient non-compliance     CTS (carpal tunnel syndrome)     GERD (gastroesophageal reflux disease)     CARDIOVASCULAR SCREENING; LDL GOAL LESS THAN 160     Prenatal care, subsequent pregnancy     Rh negative, antepartum     GBS (group B Streptococcus carrier), +RV culture, currently pregnant     Encounter for triage in pregnant patient      (spontaneous vaginal delivery)     Encounter for insertion of mirena IUD     Cervical high risk HPV (human papillomavirus) test positive     Current Outpatient Medications   Medication     potassium chloride ER (K-DUR/KLOR-CON M) 10 MEQ CR tablet     Prenatal Vit-Fe Fumarate-FA (PRENATAL MULTIVITAMIN PLUS IRON) 27-0.8 MG TABS per tablet     No current facility-administered medications for this visit.        Patient Active Problem List   Diagnosis     Bipolar disorder (H)     Constipation     Bartter's syndrome     Patient non-compliance     CTS (carpal tunnel syndrome)     GERD (gastroesophageal reflux disease)     CARDIOVASCULAR SCREENING; LDL GOAL LESS THAN 160     Prenatal care, subsequent pregnancy     Rh negative, antepartum     GBS (group B Streptococcus carrier), +RV culture, currently pregnant     Encounter for triage in pregnant patient       (spontaneous vaginal delivery)     Encounter for insertion of mirena IUD     Cervical high risk HPV (human papillomavirus) test positive     Past Surgical History:   Procedure Laterality Date     DILATION AND CURETTAGE SUCTION WITH ULTRASOUND GUIDANCE N/A 3/26/2019    Procedure: PRUDENCIO CURETTAGE WITH REMOVAL OF PLACENTA UNDER ULTRASOUND GUIDANCE;  Surgeon: Savi Smart MD;  Location: [facility] OR     TOE SURGERY      removal of glass       Social History     Tobacco Use     Smoking status: Former Smoker     Packs/day: 0.50     Years: 6.00     Pack years: 3.00     Types: Cigarettes     Last attempt to quit: 2018     Years since quittin.8     Smokeless tobacco: Never Used   Substance Use Topics     Alcohol use: No     Alcohol/week: 0.0 standard drinks     Family History   Problem Relation Age of Onset     Diabetes Mother      Thyroid Disease Mother      Depression Mother      Hypertension Paternal Grandmother      Cancer - colorectal Paternal Grandmother         colon     Cancer Paternal Grandfather         tongue cancer, not smoker     Genetic Disorder Sister         Bartter Syndrome         Current Outpatient Medications   Medication Sig Dispense Refill     potassium chloride ER (K-DUR/KLOR-CON M) 10 MEQ CR tablet Take 1 tablet (10 mEq) by mouth daily Until delivery (Patient not taking: Reported on 2019) 30 tablet 0     Prenatal Vit-Fe Fumarate-FA (PRENATAL MULTIVITAMIN PLUS IRON) 27-0.8 MG TABS per tablet Take 1 tablet by mouth daily       Allergies   Allergen Reactions     Keflex [Cephalexin Monohydrate] Hives and Rash     Recent Labs   Lab Test 19  1310 03/15/19  1430 18  1612  16  1204 16  1420 16  1330   ALT  --   --   --   --  25 27 Unsatisfactory specimen - hemolyzed  [facility] 3/28/16 AT 1410 AS     CR 0.74  --  0.66   < > 0.81 0.64 Unsatisfactory specimen - hemolyzed  [facility] 3/28/16 AT 1410 AS     GFRESTIMATED >90  --  >90   < > 85 >90  Non   "American GFR Calc   Unsatisfactory specimen - hemolyzed  [facility] 3/28/16 AT 1410 AS     GFRESTBLACK >90  --  >90   < > >90   GFR Calc   >90   GFR Calc   Unsatisfactory specimen - hemolyzed  [facility] 3/28/16 AT 1410 AS     POTASSIUM 3.1* 3.0* 2.6*   < > 3.0* 2.7* Unsatisfactory specimen - hemolyzed  [facility] 3/28/16 AT 1410 AS     TSH  --   --   --   --  0.58  --   --     < > = values in this interval not displayed.      BP Readings from Last 3 Encounters:   08/03/20 100/62   06/18/19 110/64   04/29/19 (!) 88/54    Wt Readings from Last 3 Encounters:   08/03/20 74.4 kg (164 lb)   06/18/19 60.8 kg (134 lb)   04/29/19 71.9 kg (158 lb 8 oz)                    Reviewed and updated:  Tobacco  Allergies  Meds  Med Hx  Surg Hx  Fam Hx  Soc Hx     ROS:  Constitutional, HEENT, cardiovascular, pulmonary, gi and gu systems are negative, except as otherwise noted.    PHYSICAL EXAM   /62 (Cuff Size: Adult Regular)   Pulse 64   Temp 98.7  F (37.1  C) (Tympanic)   Resp 18   Ht 1.53 m (5' 0.25\")   Wt 74.4 kg (164 lb)   Breastfeeding No   BMI 31.76 kg/m    Body mass index is 31.76 kg/m .  GENERAL: healthy, alert and no distress  EYES: Eyes grossly normal to inspection, PERRL and conjunctivae and sclerae normal  HENT: normal cephalic/atraumatic, right ear: normal: no effusions, no erythema, normal landmarks, left ear: erythematous and bulging membrane, nose and mouth without ulcers or lesions and oropharynx clear  NECK: no adenopathy, no asymmetry, masses, or scars and thyroid normal to palpation  RESP: lungs clear to auscultation - no rales, rhonchi or wheezes  CV: regular rate and rhythm, normal S1 S2, no S3 or S4, no murmur, click or rub, no peripheral edema and peripheral pulses strong  ABDOMEN: soft, nontender, no hepatosplenomegaly, no masses and bowel sounds normal  MS: no gross musculoskeletal defects noted, no edema    Assessment & Plan     (H66.002) Acute suppurative " otitis media of left ear without spontaneous rupture of tympanic membrane, recurrence not specified  (primary encounter diagnosis)  Comment: Symptoms are likely secondary to left-sided acute otitis media.  History of recurrent ear infections.  Amoxicillin prescribed, common side effects discussed, has worked well in the past.  Suggested to continue well hydration, warm fluids, over-the-counter analgesia.  Follow-up if symptom persist or worsen.  All questions answered.  Plan: amoxicillin (AMOXIL) 500 MG capsule           Patient Instructions     Patient Education     Middle Ear Infection (Adult)  You have an infection of the middle ear, the space behind the eardrum. This is also called acute otitis media (AOM). Sometimes it is caused by the common cold. This is because congestion can block the internal passage (eustachian tube) that drains fluid from the middle ear. When the middle ear fills with fluid, bacteria can grow there and cause an infection. Oral antibiotics are used to treat this illness, not ear drops. Symptoms usually start to improve within 1 to 2 days of treatment.    Home care  The following are general care guidelines:    Finish all of the antibiotic medicine given, even though you may feel better after the first few days.    You may use over-the-counter medicine, such as acetaminophen or ibuprofen, to control pain and fever, unless something else was prescribed. If you have chronic liver or kidney disease or have ever had a stomach ulcer or gastrointestinal bleeding, talk with your healthcare provider before using these medicines. Do not give aspirin to anyone under 18 years of age who has a fever. It may cause severe illness or death.  Follow-up care  Follow up with your healthcare provider, or as advised, in 2 weeks if all symptoms have not gotten better, or if hearing doesn't go back to normal within 1 month.  When to seek medical advice  Call your healthcare provider right away if any of these  occur:    Ear pain gets worse or does not improve after 3 days of treatment    Unusual drowsiness or confusion    Neck pain, stiff neck, or headache    Fluid or blood draining from the ear canal    Fever of 100.4 F (38 C) or as advised     Seizure  Date Last Reviewed: 6/1/2016 2000-2019 [employer]. [address] [place] [address]. All rights reserved. This information is not intended as a substitute for professional medical care. Always follow your healthcare professional's instructions.                 Uri Alcocer MD  [facility]

## 2020-11-02 ENCOUNTER — TELEPHONE (OUTPATIENT)
Dept: FAMILY MEDICINE | Facility: CLINIC | Age: 30
End: 2020-11-02

## 2020-11-02 ENCOUNTER — NURSE TRIAGE (OUTPATIENT)
Dept: NURSING | Facility: CLINIC | Age: 30
End: 2020-11-02

## 2020-11-02 ENCOUNTER — OFFICE VISIT (OUTPATIENT)
Dept: URGENT CARE | Facility: URGENT CARE | Age: 30
End: 2020-11-02
Payer: COMMERCIAL

## 2020-11-02 VITALS
TEMPERATURE: 99.3 F | OXYGEN SATURATION: 99 % | DIASTOLIC BLOOD PRESSURE: 72 MMHG | SYSTOLIC BLOOD PRESSURE: 118 MMHG | HEART RATE: 71 BPM

## 2020-11-02 DIAGNOSIS — R19.7 DIARRHEA, UNSPECIFIED TYPE: Primary | ICD-10-CM

## 2020-11-02 DIAGNOSIS — R11.0 NAUSEA: ICD-10-CM

## 2020-11-02 DIAGNOSIS — Z86.39 HISTORY OF HYPOKALEMIA: ICD-10-CM

## 2020-11-02 LAB
ANION GAP SERPL CALCULATED.3IONS-SCNC: 6 MMOL/L (ref 3–14)
BUN SERPL-MCNC: 11 MG/DL (ref 7–30)
CALCIUM SERPL-MCNC: 8.9 MG/DL (ref 8.5–10.1)
CHLORIDE SERPL-SCNC: 98 MMOL/L (ref 94–109)
CO2 SERPL-SCNC: 33 MMOL/L (ref 20–32)
CREAT SERPL-MCNC: 0.75 MG/DL (ref 0.52–1.04)
GFR SERPL CREATININE-BSD FRML MDRD: >90 ML/MIN/{1.73_M2}
GLUCOSE SERPL-MCNC: 83 MG/DL (ref 70–99)
POTASSIUM SERPL-SCNC: 2.5 MMOL/L (ref 3.4–5.3)
SODIUM SERPL-SCNC: 137 MMOL/L (ref 133–144)

## 2020-11-02 PROCEDURE — 80048 BASIC METABOLIC PNL TOTAL CA: CPT | Performed by: PHYSICIAN ASSISTANT

## 2020-11-02 PROCEDURE — 36415 COLL VENOUS BLD VENIPUNCTURE: CPT | Performed by: PHYSICIAN ASSISTANT

## 2020-11-02 PROCEDURE — 99214 OFFICE O/P EST MOD 30 MIN: CPT | Performed by: PHYSICIAN ASSISTANT

## 2020-11-02 PROCEDURE — U0003 INFECTIOUS AGENT DETECTION BY NUCLEIC ACID (DNA OR RNA); SEVERE ACUTE RESPIRATORY SYNDROME CORONAVIRUS 2 (SARS-COV-2) (CORONAVIRUS DISEASE [COVID-19]), AMPLIFIED PROBE TECHNIQUE, MAKING USE OF HIGH THROUGHPUT TECHNOLOGIES AS DESCRIBED BY CMS-2020-01-R: HCPCS | Performed by: PHYSICIAN ASSISTANT

## 2020-11-02 RX ORDER — ONDANSETRON 4 MG/1
4 TABLET, ORALLY DISINTEGRATING ORAL EVERY 8 HOURS PRN
Qty: 5 TABLET | Refills: 0 | Status: SHIPPED | OUTPATIENT
Start: 2020-11-02 | End: 2021-02-10

## 2020-11-02 NOTE — LETTER
[facility]  [address] [address]  Phone: [phone]  Fax: [phone]    November 2, 2020        Mora Reardon  [address] [address]          To whom it may concern:    RE: Mora Reardon    Patient was seen and treated today at our clinic and can return to work on 11/12/20.    Please contact me for questions or concerns.      Sincerely,        Cyn Hays PA-C
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
## 2020-11-03 ENCOUNTER — TELEPHONE (OUTPATIENT)
Dept: FAMILY MEDICINE | Facility: CLINIC | Age: 30
End: 2020-11-03

## 2020-11-03 ENCOUNTER — HOSPITAL ENCOUNTER (EMERGENCY)
Facility: CLINIC | Age: 30
Discharge: HOME | End: 2020-11-03
Attending: EMERGENCY MEDICINE | Admitting: EMERGENCY MEDICINE
Payer: COMMERCIAL

## 2020-11-03 VITALS
WEIGHT: 164 LBS | RESPIRATION RATE: 12 BRPM | SYSTOLIC BLOOD PRESSURE: 94 MMHG | HEART RATE: 55 BPM | OXYGEN SATURATION: 98 % | DIASTOLIC BLOOD PRESSURE: 64 MMHG | BODY MASS INDEX: 31.76 KG/M2 | TEMPERATURE: 98.8 F

## 2020-11-03 DIAGNOSIS — R19.7 DIARRHEA, UNSPECIFIED TYPE: ICD-10-CM

## 2020-11-03 DIAGNOSIS — E87.6 HYPOKALEMIA: ICD-10-CM

## 2020-11-03 DIAGNOSIS — E87.6 HYPOPOTASSEMIA: ICD-10-CM

## 2020-11-03 LAB
ALBUMIN SERPL-MCNC: 4 G/DL (ref 3.4–5)
ALP SERPL-CCNC: 82 U/L (ref 40–150)
ALT SERPL W P-5'-P-CCNC: 17 U/L (ref 0–50)
ANION GAP SERPL CALCULATED.3IONS-SCNC: 6 MMOL/L (ref 3–14)
AST SERPL W P-5'-P-CCNC: 15 U/L (ref 0–45)
BASOPHILS # BLD AUTO: 0.1 10E9/L (ref 0–0.2)
BASOPHILS NFR BLD AUTO: 0.9 %
BILIRUB SERPL-MCNC: 0.5 MG/DL (ref 0.2–1.3)
BUN SERPL-MCNC: 12 MG/DL (ref 7–30)
CALCIUM SERPL-MCNC: 9 MG/DL (ref 8.5–10.1)
CHLORIDE SERPL-SCNC: 99 MMOL/L (ref 94–109)
CO2 SERPL-SCNC: 32 MMOL/L (ref 20–32)
CREAT SERPL-MCNC: 0.76 MG/DL (ref 0.52–1.04)
DIFFERENTIAL METHOD BLD: NORMAL
EOSINOPHIL # BLD AUTO: 0.3 10E9/L (ref 0–0.7)
EOSINOPHIL NFR BLD AUTO: 2.8 %
ERYTHROCYTE [DISTWIDTH] IN BLOOD BY AUTOMATED COUNT: 13.2 % (ref 10–15)
GFR SERPL CREATININE-BSD FRML MDRD: >90 ML/MIN/{1.73_M2}
GLUCOSE SERPL-MCNC: 87 MG/DL (ref 70–99)
HCT VFR BLD AUTO: 42.1 % (ref 35–47)
HGB BLD-MCNC: 14.6 G/DL (ref 11.7–15.7)
IMM GRANULOCYTES # BLD: 0 10E9/L (ref 0–0.4)
IMM GRANULOCYTES NFR BLD: 0.3 %
LYMPHOCYTES # BLD AUTO: 3 10E9/L (ref 0.8–5.3)
LYMPHOCYTES NFR BLD AUTO: 32.3 %
MAGNESIUM SERPL-MCNC: 2.4 MG/DL (ref 1.6–2.3)
MCH RBC QN AUTO: 31 PG (ref 26.5–33)
MCHC RBC AUTO-ENTMCNC: 34.7 G/DL (ref 31.5–36.5)
MCV RBC AUTO: 89 FL (ref 78–100)
MONOCYTES # BLD AUTO: 0.5 10E9/L (ref 0–1.3)
MONOCYTES NFR BLD AUTO: 4.9 %
NEUTROPHILS # BLD AUTO: 5.4 10E9/L (ref 1.6–8.3)
NEUTROPHILS NFR BLD AUTO: 58.8 %
NRBC # BLD AUTO: 0 10*3/UL
NRBC BLD AUTO-RTO: 0 /100
PHOSPHATE SERPL-MCNC: 2.9 MG/DL (ref 2.5–4.5)
PLATELET # BLD AUTO: 285 10E9/L (ref 150–450)
POTASSIUM SERPL-SCNC: 2.5 MMOL/L (ref 3.4–5.3)
PROT SERPL-MCNC: 8.3 G/DL (ref 6.8–8.8)
RBC # BLD AUTO: 4.71 10E12/L (ref 3.8–5.2)
SARS-COV-2 RNA SPEC QL NAA+PROBE: NOT DETECTED
SODIUM SERPL-SCNC: 137 MMOL/L (ref 133–144)
SPECIMEN SOURCE: NORMAL
WBC # BLD AUTO: 9.2 10E9/L (ref 4–11)

## 2020-11-03 PROCEDURE — 99284 EMERGENCY DEPT VISIT MOD MDM: CPT | Mod: 25 | Performed by: EMERGENCY MEDICINE

## 2020-11-03 PROCEDURE — 85025 COMPLETE CBC W/AUTO DIFF WBC: CPT | Performed by: EMERGENCY MEDICINE

## 2020-11-03 PROCEDURE — 93010 ELECTROCARDIOGRAM REPORT: CPT | Performed by: EMERGENCY MEDICINE

## 2020-11-03 PROCEDURE — 96365 THER/PROPH/DIAG IV INF INIT: CPT | Performed by: EMERGENCY MEDICINE

## 2020-11-03 PROCEDURE — 80053 COMPREHEN METABOLIC PANEL: CPT | Performed by: EMERGENCY MEDICINE

## 2020-11-03 PROCEDURE — 84100 ASSAY OF PHOSPHORUS: CPT | Performed by: EMERGENCY MEDICINE

## 2020-11-03 PROCEDURE — 83735 ASSAY OF MAGNESIUM: CPT | Performed by: EMERGENCY MEDICINE

## 2020-11-03 PROCEDURE — 93005 ELECTROCARDIOGRAM TRACING: CPT | Performed by: EMERGENCY MEDICINE

## 2020-11-03 PROCEDURE — 96361 HYDRATE IV INFUSION ADD-ON: CPT | Performed by: EMERGENCY MEDICINE

## 2020-11-03 PROCEDURE — 250N000011 HC RX IP 250 OP 636: Performed by: EMERGENCY MEDICINE

## 2020-11-03 PROCEDURE — 258N000003 HC RX IP 258 OP 636: Performed by: EMERGENCY MEDICINE

## 2020-11-03 PROCEDURE — 250N000013 HC RX MED GY IP 250 OP 250 PS 637: Performed by: EMERGENCY MEDICINE

## 2020-11-03 RX ORDER — POTASSIUM CHLORIDE 1500 MG/1
TABLET, EXTENDED RELEASE ORAL
Qty: 30 TABLET | Refills: 0 | Status: SHIPPED | OUTPATIENT
Start: 2020-11-03 | End: 2021-02-21

## 2020-11-03 RX ORDER — POTASSIUM CHLORIDE 1.5 G/1.58G
40 POWDER, FOR SOLUTION ORAL ONCE
Status: COMPLETED | OUTPATIENT
Start: 2020-11-03 | End: 2020-11-03

## 2020-11-03 RX ORDER — POTASSIUM CHLORIDE 7.45 MG/ML
10 INJECTION INTRAVENOUS ONCE
Status: COMPLETED | OUTPATIENT
Start: 2020-11-03 | End: 2020-11-03

## 2020-11-03 RX ADMIN — POTASSIUM CHLORIDE 40 MEQ: 1.5 POWDER, FOR SOLUTION ORAL at 16:21

## 2020-11-03 RX ADMIN — SODIUM CHLORIDE, POTASSIUM CHLORIDE, SODIUM LACTATE AND CALCIUM CHLORIDE 1000 ML: 600; 310; 30; 20 INJECTION, SOLUTION INTRAVENOUS at 16:23

## 2020-11-03 RX ADMIN — SODIUM CHLORIDE 1000 ML: 9 INJECTION, SOLUTION INTRAVENOUS at 14:02

## 2020-11-03 RX ADMIN — POTASSIUM CHLORIDE 10 MEQ: 7.46 INJECTION, SOLUTION INTRAVENOUS at 16:25

## 2020-11-03 ASSESSMENT — ENCOUNTER SYMPTOMS
DIARRHEA: 1
SINUS PRESSURE: 0
RHINORRHEA: 0
ARTHRALGIAS: 0
FEVER: 0
COUGH: 0
WHEEZING: 0
FATIGUE: 0
NAUSEA: 1
ABDOMINAL PAIN: 0
SHORTNESS OF BREATH: 0
LIGHT-HEADEDNESS: 1
TROUBLE SWALLOWING: 0
CHILLS: 0
CHEST TIGHTNESS: 0
SORE THROAT: 0
EYE PAIN: 0
EYE REDNESS: 0
BACK PAIN: 0
VOMITING: 0
MYALGIAS: 0
UNEXPECTED WEIGHT CHANGE: 0
PALPITATIONS: 0

## 2020-11-03 NOTE — TELEPHONE ENCOUNTER
Received critical value page from lab re: patient's potassium  Potassium = 2.5. last potassium in chart was in 7/2019 at [facility] ER when patient was also documented to have absconded during work up.  Called patient's phone on record twice. Voicemail picked up. Left voicemail to call [facility] back.    Review of chart shows patient has Bartter's syndrome which predisposes her to hypokalemia, particularly with vomiting episodes, for which she visited the UC earlier today. Hence the blood test panel. Covid-19 test earlier today is pending.    Patient has no recent PCP in chart. Norma Montes is listed as primary but patient has not been to see her since 2018.    Nurse advice line was called now to advise patient when she calls to proceed to the ER as patient has risk for severe hypokalemia due to Barttlet syndrome, and her recent emesis episodes.    If patient does not call tonight, [facility] Primary or  Care Team is requested to contact patient about her labs.

## 2020-11-03 NOTE — DISCHARGE INSTRUCTIONS
ICD-10-CM    1. Hypokalemia  E87.6    2. Diarrhea, unspecified type  R19.7    3. Bartter syndrome (H)  E26.81
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
## 2020-11-03 NOTE — TELEPHONE ENCOUNTER
Dr. Rodrigez calling the FNA provider line about a patient which he received a critical result for a potassium level of 2.5. Dr. Rodrigez attempted to contact patient, but was only able to leave a voicemail for patient to call back FNA. Provider wanted to let the nurse triage line know about this patient, should she call back, to advise her to be seen in the ED. FNA RN advised that will send message to the team for awareness. Dr. Rodrigez also has a note that is documented in Epic as well.     Daija Mirza RN  [facility]

## 2020-11-03 NOTE — ED AVS SNAPSHOT
[facility] Emergency Dept  [address] [address]  Phone: [phone]  Fax: [phone]                                    Mora Reardon   MRN: [id number]    Department: [facility] Emergency Dept   Date of Visit: 11/3/2020           After Visit Summary Signature Page    I have received my discharge instructions, and my questions have been answered. I have discussed any challenges I see with this plan with the nurse or doctor.    ..........................................................................................................................................  Patient/Patient Representative Signature      ..........................................................................................................................................  Patient Representative Print Name and Relationship to Patient    ..................................................               ................................................  Date                                   Time    ..........................................................................................................................................  Reviewed by Signature/Title    ...................................................              ..............................................  Date                                               Time          22EPIC Rev 08/18

## 2020-11-03 NOTE — ED NOTES
Pt had normal day on Sat spending times with family and friends for halloween. On Sunday she started not feeling well, with vomiting and diarrhea. She still has diarrhea today with having 4 loose stools. She has not vomited today and has been taking in H20 well. However she has not been able to eat without up setting her stomach. She was seen yesterday and called last noc to come to ER due to low K, however she did not receive this message until today. She also has her son with her as she has no one to care for him at this time. She is a/o x 4. Continue to monitor

## 2020-11-03 NOTE — TELEPHONE ENCOUNTER
Reason for call:    Symptom or request:     Patient calling to find out results of bmp. Seen yesterday in urgent care.         Best Time:  any    Can we leave a detailed message on this number?  YES     Mary CRAWFORD  Station

## 2020-11-03 NOTE — ED NOTES
DATE:  11/3/2020   TIME OF RECEIPT FROM LAB:  4604  LAB TEST:  Potassium  LAB VALUE:  2.5  RESULTS GIVEN WITH READ-BACK TO (PROVIDER):  Aimee  TIME LAB VALUE REPORTED TO PROVIDER:   2374

## 2021-02-04 ENCOUNTER — PATIENT OUTREACH (OUTPATIENT)
Dept: OBGYN | Facility: CLINIC | Age: 31
End: 2021-02-04

## 2021-02-04 DIAGNOSIS — R87.810 CERVICAL HIGH RISK HPV (HUMAN PAPILLOMAVIRUS) TEST POSITIVE: ICD-10-CM

## 2021-02-09 ENCOUNTER — TELEPHONE (OUTPATIENT)
Dept: FAMILY MEDICINE | Facility: CLINIC | Age: 31
End: 2021-02-09

## 2021-02-09 NOTE — TELEPHONE ENCOUNTER
She is ok to come in with active bleeding, lets discuss a plan at her appointment next week.    Thanks,     ADELAIDE Hinojosa CNP

## 2021-02-09 NOTE — TELEPHONE ENCOUNTER
S-(situation): Patient has been trying to schedule PA but continues to have bleeding.     B-(background): Upcoming appointment scheduled fir Norma Montes, 2/16/21    A-(assessment): Patient calls and states trying to have a PAP completed for HPV. Patient reports she is activley bleeding or spotting almost every day and never knows when it will come or not.   No current birth control.     R-(recommendations): Should patient be started on an oral birth control to control bleeding? Ok to come in if having vaginal bleeding?    Please advise.

## 2021-02-10 ENCOUNTER — ANCILLARY PROCEDURE (OUTPATIENT)
Dept: GENERAL RADIOLOGY | Facility: CLINIC | Age: 31
End: 2021-02-10
Attending: NURSE PRACTITIONER
Payer: COMMERCIAL

## 2021-02-10 ENCOUNTER — OFFICE VISIT (OUTPATIENT)
Dept: FAMILY MEDICINE | Facility: CLINIC | Age: 31
End: 2021-02-10
Payer: COMMERCIAL

## 2021-02-10 VITALS
SYSTOLIC BLOOD PRESSURE: 104 MMHG | RESPIRATION RATE: 16 BRPM | BODY MASS INDEX: 34.16 KG/M2 | DIASTOLIC BLOOD PRESSURE: 62 MMHG | HEART RATE: 68 BPM | TEMPERATURE: 97.7 F | WEIGHT: 174 LBS | HEIGHT: 60 IN

## 2021-02-10 DIAGNOSIS — E87.6 HYPOKALEMIA: ICD-10-CM

## 2021-02-10 DIAGNOSIS — T83.32XA INTRAUTERINE CONTRACEPTIVE DEVICE THREADS LOST, INITIAL ENCOUNTER: ICD-10-CM

## 2021-02-10 DIAGNOSIS — Z00.00 ROUTINE GENERAL MEDICAL EXAMINATION AT A HEALTH CARE FACILITY: Primary | ICD-10-CM

## 2021-02-10 DIAGNOSIS — N92.1 MENORRHAGIA WITH IRREGULAR CYCLE: ICD-10-CM

## 2021-02-10 LAB
HCG UR QL: NEGATIVE
POTASSIUM SERPL-SCNC: 2.9 MMOL/L (ref 3.4–5.3)

## 2021-02-10 PROCEDURE — 99395 PREV VISIT EST AGE 18-39: CPT | Performed by: NURSE PRACTITIONER

## 2021-02-10 PROCEDURE — 72170 X-RAY EXAM OF PELVIS: CPT | Performed by: RADIOLOGY

## 2021-02-10 PROCEDURE — 99213 OFFICE O/P EST LOW 20 MIN: CPT | Mod: 25 | Performed by: NURSE PRACTITIONER

## 2021-02-10 PROCEDURE — 81025 URINE PREGNANCY TEST: CPT | Performed by: NURSE PRACTITIONER

## 2021-02-10 PROCEDURE — 36415 COLL VENOUS BLD VENIPUNCTURE: CPT | Performed by: NURSE PRACTITIONER

## 2021-02-10 PROCEDURE — 87624 HPV HI-RISK TYP POOLED RSLT: CPT | Performed by: NURSE PRACTITIONER

## 2021-02-10 PROCEDURE — 84132 ASSAY OF SERUM POTASSIUM: CPT | Performed by: NURSE PRACTITIONER

## 2021-02-10 PROCEDURE — G0145 SCR C/V CYTO,THINLAYER,RESCR: HCPCS | Performed by: NURSE PRACTITIONER

## 2021-02-10 ASSESSMENT — ENCOUNTER SYMPTOMS
PALPITATIONS: 0
HEMATURIA: 0
HEARTBURN: 0
DIZZINESS: 0
EYE PAIN: 0
SHORTNESS OF BREATH: 0
WEAKNESS: 0
HEADACHES: 0
FEVER: 0
JOINT SWELLING: 0
PARESTHESIAS: 0
DIARRHEA: 0
ABDOMINAL PAIN: 1
NERVOUS/ANXIOUS: 0
BREAST MASS: 0
COUGH: 0
HEMATOCHEZIA: 0
DYSURIA: 0
ARTHRALGIAS: 0
CONSTIPATION: 0
MYALGIAS: 0
SORE THROAT: 0
FREQUENCY: 0
CHILLS: 0
NAUSEA: 0

## 2021-02-10 ASSESSMENT — MIFFLIN-ST. JEOR: SCORE: 1424.73

## 2021-02-10 NOTE — PATIENT INSTRUCTIONS
P  Please call [phone] to schedule your ultrasound    reventive Health Recommendations  Female Ages 26 - 39  Yearly exam:   See your health care provider every year in order to    Review health changes.     Discuss preventive care.      Review your medicines if you your doctor has prescribed any.    Until age 30: Get a Pap test every three years (more often if you have had an abnormal result).    After age 30: Talk to your doctor about whether you should have a Pap test every 3 years or have a Pap test with HPV screening every 5 years.   You do not need a Pap test if your uterus was removed (hysterectomy) and you have not had cancer.  You should be tested each year for STDs (sexually transmitted diseases), if you're at risk.   Talk to your provider about how often to have your cholesterol checked.  If you are at risk for diabetes, you should have a diabetes test (fasting glucose).  Shots: Get a flu shot each year. Get a tetanus shot every 10 years.   Nutrition:     Eat at least 5 servings of fruits and vegetables each day.    Eat whole-grain bread, whole-wheat pasta and brown rice instead of white grains and rice.    Get adequate Calcium and Vitamin D.     Lifestyle    Exercise at least 150 minutes a week (30 minutes a day, 5 days of the week). This will help you control your weight and prevent disease.    Limit alcohol to one drink per day.    No smoking.     Wear sunscreen to prevent skin cancer.    See your dentist every six months for an exam and cleaning.

## 2021-02-10 NOTE — PROGRESS NOTES
"   SUBJECTIVE:   CC: Mora Reardon is an 30 year old woman who presents for preventive health visit.       Patient has been advised of split billing requirements and indicates understanding: Yes  Healthy Habits:     Getting at least 3 servings of Calcium per day:  Yes    Bi-annual eye exam:  NO    Dental care twice a year:  NO    Sleep apnea or symptoms of sleep apnea:  None    Diet:  Regular (no restrictions)    Frequency of exercise:  None    Taking medications regularly:  Yes    Medication side effects:  None    PHQ-2 Total Score: 0    Additional concerns today:  No    Vaginal Bleeding (Dysmenorrhea)      Onset: 7 months     Description:  Duration of bleeding episodes: 6 days   Frequency between periods: having spotting between cycles   Describe bleeding/flow:   Clots: no   Cramping: mild    Intensity:  moderate    Accompanying signs and symptoms: none    History (similar episodes/previous evaluation): None    Precipitating or alleviating factors: None    Therapies tried and outcome: None    IUD has been \"missing\" for a few years.  Pelvic ultrasound done at [facility] 2019 and IUD was not visualized. Mora thought is just fell out and did not follow up    Today's PHQ-2 Score:   PHQ-2 (  [employer]) 2/10/2021   Q1: Little interest or pleasure in doing things 0   Q2: Feeling down, depressed or hopeless 0   PHQ-2 Score 0   Q1: Little interest or pleasure in doing things Not at all   Q2: Feeling down, depressed or hopeless Not at all   PHQ-2 Score 0       Abuse: Current or Past (Physical, Sexual or Emotional) - No  Do you feel safe in your environment? Yes        Social History     Tobacco Use     Smoking status: Former Smoker     Packs/day: 0.50     Years: 6.00     Pack years: 3.00     Types: Cigarettes     Quit date: 2018     Years since quittin.4     Smokeless tobacco: Never Used   Substance Use Topics     Alcohol use: No     Alcohol/week: 0.0 standard drinks         Alcohol Use 2/10/2021 "   Prescreen: >3 drinks/day or >7 drinks/week? Not Applicable   Prescreen: >3 drinks/day or >7 drinks/week? -         Reviewed orders with patient.  Reviewed health maintenance and updated orders accordingly - Yes  Labs reviewed in [facility]    Breast CA Risk Screening:  Breast CA Risk Assessment (FHS-7) 2/10/2021   Did any of your first-degree relatives have breast or ovarian cancer? Yes   Did any of your relatives have bilateral breast cancer? Unknown   Did any man in your family have breast cancer? No   Did any woman in your family have breast and ovarian cancer? Yes   Did any woman in your family have breast cancer before age 50 y? Yes   Do you have 2 or more relatives with breast and/or ovarian cancer? Yes   Do you have 2 or more relatives with breast and/or bowel cancer? Yes     Breast cancer-maternal grandmother (possibly in 60's), paternal grandmother (possibly 60's), paternal aunt-46    Pertinent mammograms are reviewed under the imaging tab.    History of abnormal Pap smear:   Last 3 Pap and HPV Results:   PAP / HPV Latest Ref Rng & Units 2/10/2021 4/30/2019 4/29/2019   PAP - NIL NIL -   HPV 16 DNA NEG:Negative Negative - Negative   HPV 18 DNA NEG:Negative Negative - Negative   OTHER HR HPV NEG:Negative Negative - Positive(A)     Reviewed and updated as needed this visit by clinical staff  Tobacco  Allergies  Meds  Problems  Med Hx  Surg Hx  Fam Hx  Soc Hx          Reviewed and updated as needed this visit by Provider  Tobacco  Allergies  Meds  Problems  Med Hx  Surg Hx  Fam Hx           Review of Systems   Constitutional: Negative for chills and fever.   HENT: Negative for congestion, ear pain, hearing loss and sore throat.    Eyes: Negative for pain and visual disturbance.   Respiratory: Negative for cough and shortness of breath.    Cardiovascular: Negative for chest pain, palpitations and peripheral edema.   Gastrointestinal: Positive for abdominal pain (only with cycles). Negative for  "constipation, diarrhea, heartburn, hematochezia and nausea.   Breasts:  Negative for tenderness, breast mass and discharge.   Genitourinary: Positive for vaginal bleeding. Negative for dysuria, frequency, genital sores, hematuria, pelvic pain, urgency and vaginal discharge.   Musculoskeletal: Negative for arthralgias, joint swelling and myalgias.   Skin: Negative for rash.   Neurological: Negative for dizziness, weakness, headaches and paresthesias.   Psychiatric/Behavioral: Negative for mood changes. The patient is not nervous/anxious.       OBJECTIVE:   /62 (Cuff Size: Adult Large)   Pulse 68   Temp 97.7  F (36.5  C) (Tympanic)   Resp 16   Ht 1.53 m (5' 0.25\")   Wt 78.9 kg (174 lb)   BMI 33.70 kg/m    Physical Exam  GENERAL: healthy, alert and no distress  EYES: Eyes grossly normal to inspection, PERRL and conjunctivae and sclerae normal  HENT: ear canals and TM's normal, nose and mouth without ulcers or lesions  NECK: no adenopathy, no asymmetry, masses, or scars and thyroid normal to palpation  RESP: lungs clear to auscultation - no rales, rhonchi or wheezes  CV: regular rate and rhythm, normal S1 S2, no S3 or S4, no murmur, click or rub, no peripheral edema and peripheral pulses strong  ABDOMEN: soft, nontender, no hepatosplenomegaly, no masses and bowel sounds normal   (female): normal female external genitalia, normal urethral meatus, vaginal mucosa pink, moist, well rugated, and normal cervix/adnexa/uterus without masses or discharge  MS: no gross musculoskeletal defects noted, no edema  SKIN: no suspicious lesions or rashes  NEURO: Normal strength and tone, mentation intact and speech normal  PSYCH: mentation appears normal, affect normal/bright    Diagnostic Test Results:       ASSESSMENT/PLAN:   1. Routine general medical examination at a health care facility    - Pap imaged thin layer screen with HPV - recommended age 30 - 65  - HPV High Risk Types DNA Cervical    2. Menorrhagia with " "irregular cycle  With menorrhagia and IUD misplaced will do pelvic ultrasound for further evaluation of symptoms.  May need to consider CT scan to evaluate IUD.    - US Pelvic Complete with Transvaginal; Future  - HCG qualitative urine    3. Hypokalemia  Continued hypokalemia, did not replace following last check.  I sent replacement to the pharmacy with a plan for a recheck in 2 weeks.   - **Potassium FUTURE anytime    4. Intrauterine contraceptive device threads lost, initial encounter  IUD present but not in place per x ray, ultrasound ordered per above however may need to consider CT scan for further evaluation and OB/GYN referral.   - XR Pelvis 1/2 Views; Future    Patient has been advised of split billing requirements and indicates understanding: Yes  COUNSELING:  Reviewed preventive health counseling, as reflected in patient instructions    Estimated body mass index is 33.7 kg/m  as calculated from the following:    Height as of this encounter: 1.53 m (5' 0.25\").    Weight as of this encounter: 78.9 kg (174 lb).     She reports that she quit smoking about 2 years ago. Her smoking use included cigarettes. She has a 3.00 pack-year smoking history. She has never used smokeless tobacco.      Counseling Resources:  ATP IV Guidelines  Pooled Cohorts Equation Calculator  Breast Cancer Risk Calculator  BRCA-Related Cancer Risk Assessment: FHS-7 Tool  FRAX Risk Assessment  ICSI Preventive Guidelines  Dietary Guidelines for Americans, 2010  USDA's MyPlate  ASA Prophylaxis  Lung CA Screening    ADELAIDE Hinojosa CNP  [facility]  "

## 2021-02-12 LAB
COPATH REPORT: NORMAL
PAP: NORMAL

## 2021-02-16 LAB
FINAL DIAGNOSIS: NORMAL
HPV HR 12 DNA CVX QL NAA+PROBE: NEGATIVE
HPV16 DNA SPEC QL NAA+PROBE: NEGATIVE
HPV18 DNA SPEC QL NAA+PROBE: NEGATIVE
SPECIMEN DESCRIPTION: NORMAL
SPECIMEN SOURCE CVX/VAG CYTO: NORMAL

## 2021-02-17 ENCOUNTER — PATIENT OUTREACH (OUTPATIENT)
Dept: FAMILY MEDICINE | Facility: CLINIC | Age: 31
End: 2021-02-17

## 2021-02-18 NOTE — TELEPHONE ENCOUNTER
2/10/21 NIL pap, Neg HPV. Plan cotest in 1 year.    Andreas Alexis, BSN, RN   Pap Tracking Nurse

## 2021-02-21 ENCOUNTER — HOSPITAL ENCOUNTER (OUTPATIENT)
Dept: ULTRASOUND IMAGING | Facility: CLINIC | Age: 31
Discharge: HOME | End: 2021-02-21
Attending: NURSE PRACTITIONER | Admitting: NURSE PRACTITIONER
Payer: COMMERCIAL

## 2021-02-21 DIAGNOSIS — N92.1 MENORRHAGIA WITH IRREGULAR CYCLE: ICD-10-CM

## 2021-02-21 PROCEDURE — 76830 TRANSVAGINAL US NON-OB: CPT

## 2021-02-21 RX ORDER — POTASSIUM CHLORIDE 1500 MG/1
20 TABLET, EXTENDED RELEASE ORAL
Qty: 10 TABLET | Refills: 0 | Status: SHIPPED | OUTPATIENT
Start: 2021-02-21 | End: 2021-02-26

## 2021-02-21 NOTE — PROGRESS NOTES
Please apologize about the delay regarding Mora's potassium results.  She continues to have low potassium.  I would like her to supplement with 20 mEq of potassium twice daily for 5 days with a recheck of potassium in 2 weeks.    Thanks,     ADELAIDE Hinojosa CNP

## 2021-02-22 ENCOUNTER — TELEPHONE (OUTPATIENT)
Dept: FAMILY MEDICINE | Facility: CLINIC | Age: 31
End: 2021-02-22

## 2021-02-22 DIAGNOSIS — T83.32XA DISPLACEMENT OF INTRAUTERINE CONTRACEPTIVE DEVICE, INITIAL ENCOUNTER: Primary | ICD-10-CM

## 2021-02-22 NOTE — PROGRESS NOTES
Pt was called and notified; transferred to scheduling to make lab only appt.    Jeniffer BERNARD RN, BSN

## 2021-02-22 NOTE — TELEPHONE ENCOUNTER
Called pt about K+ and she wants to know about her pelvic US results, done yesterday.    US PELVIC COMPLETE WITH TRANSVAGINAL 2/21/2021 4:06 PM  IMPRESSION:  1.  IUD appears to reside outside of the uterus within the region of the left adnexa and adjacent to the left posterior lateral uterus. If clinically relevant, this could be confirmed with CT.  2.  No acute abnormality is seen.    Jeniffer BERNARD RN, BSN

## 2021-02-25 ENCOUNTER — HOSPITAL ENCOUNTER (OUTPATIENT)
Dept: CT IMAGING | Facility: CLINIC | Age: 31
Discharge: HOME | End: 2021-02-25
Attending: NURSE PRACTITIONER | Admitting: NURSE PRACTITIONER
Payer: COMMERCIAL

## 2021-02-25 DIAGNOSIS — T83.32XA DISPLACEMENT OF INTRAUTERINE CONTRACEPTIVE DEVICE, INITIAL ENCOUNTER: ICD-10-CM

## 2021-02-25 PROCEDURE — 72192 CT PELVIS W/O DYE: CPT

## 2021-02-26 DIAGNOSIS — T83.32XA DISPLACEMENT OF INTRAUTERINE CONTRACEPTIVE DEVICE, INITIAL ENCOUNTER: Primary | ICD-10-CM

## 2021-03-10 ENCOUNTER — TELEPHONE (OUTPATIENT)
Dept: FAMILY MEDICINE | Facility: CLINIC | Age: 31
End: 2021-03-10

## 2021-03-10 NOTE — TELEPHONE ENCOUNTER
S-(situation): Patient calling to find out where and with whom her appt tomorrow is with.  B-(background): Patient has appt    A-(assessment): Patient has questions.    R-(recommendations): Patient informed of appt tomorrow with OB/Gyn tomorrow at [place] to discuss findings of IUD.  Annabel Espino RN

## 2021-03-11 ENCOUNTER — OFFICE VISIT (OUTPATIENT)
Dept: OBGYN | Facility: CLINIC | Age: 31
End: 2021-03-11
Payer: COMMERCIAL

## 2021-03-11 ENCOUNTER — TELEPHONE (OUTPATIENT)
Dept: OBGYN | Facility: CLINIC | Age: 31
End: 2021-03-11

## 2021-03-11 VITALS
WEIGHT: 177.8 LBS | BODY MASS INDEX: 34.91 KG/M2 | SYSTOLIC BLOOD PRESSURE: 111 MMHG | DIASTOLIC BLOOD PRESSURE: 62 MMHG | RESPIRATION RATE: 14 BRPM | HEART RATE: 69 BPM | TEMPERATURE: 98.4 F | HEIGHT: 60 IN

## 2021-03-11 DIAGNOSIS — Z01.812 ENCOUNTER FOR PREOPERATIVE SCREENING LABORATORY TESTING FOR COVID-19 VIRUS: ICD-10-CM

## 2021-03-11 DIAGNOSIS — T83.32XA IUD MIGRATION, INITIAL ENCOUNTER: Primary | ICD-10-CM

## 2021-03-11 DIAGNOSIS — Z30.2 ENCOUNTER FOR STERILIZATION: ICD-10-CM

## 2021-03-11 DIAGNOSIS — Z11.52 ENCOUNTER FOR PREOPERATIVE SCREENING LABORATORY TESTING FOR COVID-19 VIRUS: ICD-10-CM

## 2021-03-11 PROBLEM — Z36.89 ENCOUNTER FOR TRIAGE IN PREGNANT PATIENT: Status: RESOLVED | Noted: 2019-03-26 | Resolved: 2021-03-11

## 2021-03-11 PROBLEM — Z30.430 ENCOUNTER FOR INSERTION OF MIRENA IUD: Status: RESOLVED | Noted: 2019-04-29 | Resolved: 2021-03-11

## 2021-03-11 PROBLEM — O99.820 GBS (GROUP B STREPTOCOCCUS CARRIER), +RV CULTURE, CURRENTLY PREGNANT: Status: RESOLVED | Noted: 2019-03-04 | Resolved: 2021-03-11

## 2021-03-11 PROCEDURE — 99213 OFFICE O/P EST LOW 20 MIN: CPT | Performed by: OBSTETRICS & GYNECOLOGY

## 2021-03-11 RX ORDER — ACETAMINOPHEN 325 MG/1
975 TABLET ORAL ONCE
Status: CANCELLED | OUTPATIENT
Start: 2021-03-11 | End: 2021-03-11

## 2021-03-11 RX ORDER — POTASSIUM CHLORIDE 1500 MG/1
40 TABLET, EXTENDED RELEASE ORAL
COMMUNITY
Start: 2019-07-07 | End: 2022-06-10

## 2021-03-11 ASSESSMENT — MIFFLIN-ST. JEOR: SCORE: 1441.97
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
## 2021-03-11 NOTE — NURSING NOTE
"Initial /62 (BP Location: Right arm, Patient Position: Sitting, Cuff Size: Adult Regular)   Pulse 69   Temp 98.4  F (36.9  C) (Tympanic)   Resp 14   Ht 1.53 m (5' 0.25\")   Wt 80.6 kg (177 lb 12.8 oz)   LMP 02/16/2021 (Approximate)   BMI 34.44 kg/m   Estimated body mass index is 34.44 kg/m  as calculated from the following:    Height as of this encounter: 1.53 m (5' 0.25\").    Weight as of this encounter: 80.6 kg (177 lb 12.8 oz). .        "

## 2021-03-11 NOTE — TELEPHONE ENCOUNTER
Patient was seen in clinic to discuss surgery. ERAS handouts/ Hibiclens and Gatorade drink mix handed to patient. Went through information handouts with patient and informed patient that our Station  will be calling to help schedule for surgery. Patient had no questions.   Devin Underwood MA on 3/11/2021 at 10:19 AM

## 2021-03-18 ENCOUNTER — TELEPHONE (OUTPATIENT)
Dept: OBGYN | Facility: CLINIC | Age: 31
End: 2021-03-18

## 2021-03-18 NOTE — TELEPHONE ENCOUNTER
Left a message for patient to call back to schedule surgery.  MA consent signed 3-11-21.  Tentatively scheduled for 4-12-21 - need to confirm with pt and give her instructions.  Per Isabel Sherwood,  - she will update pre-op AM of surgery.    -Lisette Neff  Clinic Station   -[facility]  [phone] opt #3

## 2021-03-23 NOTE — TELEPHONE ENCOUNTER
Lm x 2 - tentatively scheduled surgery on 4-12-21.  Need to know asap if pt wants to keep this or change dates.    -Lisette Neff  Clinic [facility] [place]

## 2021-03-23 NOTE — TELEPHONE ENCOUNTER
"  You are now scheduled for surgery at [facility].  Below are the details for your surgery.  Please read the \"Preparing for Your Surgery\" instructions and let us know if you have any questions.    Type of surgery: SALPINGECTOMY, LAPAROSCOPIC Laparoscopic removal of displaced intrauterine device   Surgeon:  Isabel Sherwood DO  Location of surgery: [facility] OR    Date of surgery: 4-12-21    Time: 9:30am   Arrival Time: 8:30am    Time can change, to be confirmed a couple of days prior by pre-op surgery nurse.    Pre-Op Appt Date: AM of surgery - Isabel Sherwood DO  Post-Op Appt Date: To be determined by provider     COVID test 2-4 days prior at [facility]  Date 4-8-21   Time 4:00pm    Packet sent out: Yes  Pre-cert/Authorization completed:  TBD by Financial Securing Office.   MA Sterilization/Hysterectomy Acknowledgment Consent signed: yes 3-11-21    [facility]  [phone]    Fax: [phone]  Same Day Surgery [phone]  Fax: [phone]  [facility] [phone]    ERAS info given at appt.    "

## 2021-03-30 ENCOUNTER — OFFICE VISIT (OUTPATIENT)
Dept: URGENT CARE | Facility: URGENT CARE | Age: 31
End: 2021-03-30
Payer: COMMERCIAL

## 2021-03-30 VITALS
OXYGEN SATURATION: 98 % | SYSTOLIC BLOOD PRESSURE: 100 MMHG | TEMPERATURE: 98.8 F | DIASTOLIC BLOOD PRESSURE: 62 MMHG | WEIGHT: 177.8 LBS | RESPIRATION RATE: 16 BRPM | HEART RATE: 83 BPM | BODY MASS INDEX: 34.44 KG/M2

## 2021-03-30 DIAGNOSIS — J06.9 VIRAL UPPER RESPIRATORY TRACT INFECTION: Primary | ICD-10-CM

## 2021-03-30 DIAGNOSIS — J02.9 SORE THROAT: ICD-10-CM

## 2021-03-30 LAB
DEPRECATED S PYO AG THROAT QL EIA: NEGATIVE
SPECIMEN SOURCE: NORMAL
SPECIMEN SOURCE: NORMAL
STREP GROUP A PCR: NOT DETECTED

## 2021-03-30 PROCEDURE — U0003 INFECTIOUS AGENT DETECTION BY NUCLEIC ACID (DNA OR RNA); SEVERE ACUTE RESPIRATORY SYNDROME CORONAVIRUS 2 (SARS-COV-2) (CORONAVIRUS DISEASE [COVID-19]), AMPLIFIED PROBE TECHNIQUE, MAKING USE OF HIGH THROUGHPUT TECHNOLOGIES AS DESCRIBED BY CMS-2020-01-R: HCPCS | Performed by: PHYSICIAN ASSISTANT

## 2021-03-30 PROCEDURE — 99213 OFFICE O/P EST LOW 20 MIN: CPT | Performed by: PHYSICIAN ASSISTANT

## 2021-03-30 PROCEDURE — 99N1174 PR STATISTIC STREP A RAPID: Performed by: PHYSICIAN ASSISTANT

## 2021-03-30 PROCEDURE — 87651 STREP A DNA AMP PROBE: CPT | Performed by: PHYSICIAN ASSISTANT

## 2021-03-30 PROCEDURE — U0005 INFEC AGEN DETEC AMPLI PROBE: HCPCS | Performed by: PHYSICIAN ASSISTANT

## 2021-03-30 ASSESSMENT — ENCOUNTER SYMPTOMS
RHINORRHEA: 0
FATIGUE: 0
SORE THROAT: 1
EYE PAIN: 0
HEARTBURN: 0
WHEEZING: 0
VOMITING: 0
ARTHRALGIAS: 0
CHILLS: 1
PALPITATIONS: 0
BACK PAIN: 0
COUGH: 1
CONSTIPATION: 0
ABDOMINAL PAIN: 0
FEVER: 0
NAUSEA: 0
EYE DISCHARGE: 0
SHORTNESS OF BREATH: 0
HEMATOCHEZIA: 0
EYE REDNESS: 0
SINUS PRESSURE: 0
MYALGIAS: 0
SINUS PAIN: 0
DIARRHEA: 0

## 2021-03-30 NOTE — PROGRESS NOTES
"    Assessment & Plan     Viral upper respiratory tract infection  Rapid strep negative. COVID pending. This is likely viral. Continue with supportive care. Get plenty of rest and push fluids. Can use Tylenol and/or ibuprofen as needed for pain and/or fever control. Discussed quarantine guidelines.      Sore throat    - Streptococcus A Rapid Scr w Reflx to PCR  - Group A Streptococcus PCR Throat Swab  - Symptomatic COVID-19 Virus (Coronavirus) by PCR; Future  - Symptomatic COVID-19 Virus (Coronavirus) by PCR             Tobacco Cessation:   reports that she has been smoking cigarettes. She has a 3.00 pack-year smoking history. She has never used smokeless tobacco.      BMI:   Estimated body mass index is 34.44 kg/m  as calculated from the following:    Height as of 3/11/21: 1.53 m (5' 0.25\").    Weight as of this encounter: 80.6 kg (177 lb 12.8 oz).           Return in about 1 week (around 4/6/2021), or if symptoms worsen or fail to improve.    DANISH Soriano [facility]    Subjective   Chief Complaint   Patient presents with     Pharyngitis     This morning throat started hurting, cough, headache, chills.         HPI     URI Adult    Onset of symptoms was today.  Course of illness is same.    Severity moderate  Current and Associated symptoms: sore throat, cough, chills, headache  Treatment measures tried include Tylenol/Ibuprofen.  Predisposing factors include None.              Review of Systems   Constitutional: Positive for chills. Negative for fatigue and fever.   HENT: Positive for sore throat. Negative for congestion, ear pain, rhinorrhea, sinus pressure and sinus pain.    Eyes: Negative for pain, discharge and redness.   Respiratory: Positive for cough. Negative for shortness of breath and wheezing.    Cardiovascular: Negative for palpitations.   Gastrointestinal: Negative for abdominal pain, constipation, diarrhea, heartburn, hematochezia, nausea and vomiting. "   Musculoskeletal: Negative for arthralgias, back pain and myalgias.   Skin: Negative for rash.            Objective    /62 (BP Location: Right arm, Patient Position: Sitting, Cuff Size: Adult Regular)   Pulse 83   Temp 98.8  F (37.1  C) (Tympanic)   Resp 16   Wt 80.6 kg (177 lb 12.8 oz)   SpO2 98%   BMI 34.44 kg/m    Body mass index is 34.44 kg/m .  Physical Exam  Constitutional:       Appearance: She is well-developed.   HENT:      Head: Normocephalic.      Right Ear: Tympanic membrane and ear canal normal.      Left Ear: Tympanic membrane and ear canal normal.      Mouth/Throat:      Pharynx: Posterior oropharyngeal erythema present.   Eyes:      Conjunctiva/sclera: Conjunctivae normal.      Pupils: Pupils are equal, round, and reactive to light.   Cardiovascular:      Rate and Rhythm: Normal rate.      Heart sounds: Normal heart sounds.   Pulmonary:      Effort: Pulmonary effort is normal.      Breath sounds: Normal breath sounds.   Skin:     General: Skin is warm and dry.      Findings: No rash.   Psychiatric:         Behavior: Behavior normal.            Results for orders placed or performed in visit on 03/30/21 (from the past 24 hour(s))   Streptococcus A Rapid Scr w Reflx to PCR    Specimen: Throat   Result Value Ref Range    Strep Specimen Description Throat     Streptococcus Group A Rapid Screen Negative NEG^Negative   Group A Streptococcus PCR Throat Swab    Specimen: Throat   Result Value Ref Range    Specimen Description Throat     Strep Group A PCR Not Detected NDET^Not Detected

## 2021-03-30 NOTE — LETTER
[facility] CARE [place] [id number] [address]  Phone: [phone]  Fax: [phone]    March 30, 2021        Mora Reardon  [id number] [facility] [address]          To whom it may concern:    RE: Mora Reardon    Patient was seen and treated today at our clinic and missed work 03/31/21.    Please contact me for questions or concerns.      Sincerely,        Cyn Hays PA-C

## 2021-03-31 LAB
SARS-COV-2 RNA RESP QL NAA+PROBE: NOT DETECTED
SPECIMEN SOURCE: NORMAL

## 2021-04-03 ENCOUNTER — NURSE TRIAGE (OUTPATIENT)
Dept: NURSING | Facility: CLINIC | Age: 31
End: 2021-04-03

## 2021-04-03 ENCOUNTER — OFFICE VISIT (OUTPATIENT)
Dept: URGENT CARE | Facility: URGENT CARE | Age: 31
End: 2021-04-03
Payer: COMMERCIAL

## 2021-04-03 VITALS
BODY MASS INDEX: 34.44 KG/M2 | DIASTOLIC BLOOD PRESSURE: 60 MMHG | SYSTOLIC BLOOD PRESSURE: 104 MMHG | OXYGEN SATURATION: 98 % | WEIGHT: 177.8 LBS | TEMPERATURE: 98.1 F | HEART RATE: 72 BPM | RESPIRATION RATE: 16 BRPM

## 2021-04-03 DIAGNOSIS — H65.93 BILATERAL NON-SUPPURATIVE OTITIS MEDIA: Primary | ICD-10-CM

## 2021-04-03 PROCEDURE — 99213 OFFICE O/P EST LOW 20 MIN: CPT | Performed by: EMERGENCY MEDICINE

## 2021-04-03 RX ORDER — AMOXICILLIN 500 MG/1
500 CAPSULE ORAL 3 TIMES DAILY
Qty: 30 CAPSULE | Refills: 0 | Status: SHIPPED | OUTPATIENT
Start: 2021-04-03 | End: 2021-04-13

## 2021-04-03 NOTE — PATIENT INSTRUCTIONS
Start amoxicillin today and take for 10 straight days.    Recheck if worsening symptoms.    Recheck 1 week if symptoms persist.  Patient Education     Middle Ear Infection (Otitis Media) in Adults  What is a middle ear infection?  A middle ear infection occurs behind the eardrum. It is most often caused by a virus or bacteria. Most kids have at least one middle ear infection by the time they are 3 years old. But adults can also get them.   What causes middle ear infections?  Inflammation in the middle ear most often starts after you ve had a sore throat, cold, or other upper respiratory problem. The infection spreads to the middle ear and causes fluid buildup behind the eardrum.    What are the symptoms of a middle ear infection?  These are the most common symptoms of middle ear infections in adults:   Ear pain  Feeling of fullness in the ear  Fluid draining from the ear  Fever  Hearing loss  These symptoms may look like other conditions or health problems. Always talk with your healthcare provider for a diagnosis.   How is a middle ear infection diagnosed?  Your healthcare provider will review your health history and do a physical exam. He or she will check the outer ear and the eardrum using an otoscope. The otoscope is a lighted tool that lets the healthcare provider see inside the ear. A pneumatic otoscope blows a puff of air into the ear to test eardrum movement. When there is fluid or infection in the middle ear, movement is decreased.   Your provider may also do a tympanometry. This is a test that directs air and sound to the middle ear.   If you have ear infections often, your healthcare provider may suggest having a hearing test.   How is a middle ear infection treated?  Treatment will depend on your symptoms, age, and general health. It will also depend on how severe the condition is.   Treatment may include:  Antibiotics  Pain relievers  Placing small tubes in the eardrum for chronic ear  infections   What are possible complications of a middle ear infection?   Untreated ear infections can lead to:  Infection in other parts of the head  Lasting (permanent) hearing loss  Speech and language problems  Can middle ear infections be prevented?  Cold and allergy medicines don't seem to prevent ear infections. And currently there is no vaccine that can prevent the disease. But check with your healthcare provider and make sure your vaccines are up-to-date. Living in a home where cigarettes are smoked can increase the chances of ear infections.   Key points about middle ear infections  Middle ear infections can affect both children and adults.  Pain and fever can be the most common symptoms.  Without treatment, permanent hearing loss may happen.  Take antibiotics as prescribed and finish all of the prescription. This can help prevent antibiotic-resistant infections or incomplete treatment with the infection returning.    Next steps  Tips to help you get the most from a visit to your healthcare provider:  Know the reason for your visit and what you want to happen.  Before your visit, write down questions you want answered.  Bring someone with you to help you ask questions and remember what your provider tells you.  At the visit, write down the name of a new diagnosis, and any new medicines, treatments, or tests. Also write down any new instructions your provider gives you.  Know why a new medicine or treatment is prescribed, and how it will help you. Also know what the side effects are.  Ask if your condition can be treated in other ways.  Know why a test or procedure is recommended and what the results could mean.  Know what to expect if you do not take the medicine or have the test or procedure.  If you have a follow-up appointment, write down the date, time, and purpose for that visit.  Know how you can contact your provider if you have questions.  Shilpa last reviewed this educational content on  12/1/2018 2000-2020 [employer]. All rights reserved. This information is not intended as a substitute for professional medical care. Always follow your healthcare professional's instructions.

## 2021-04-03 NOTE — TELEPHONE ENCOUNTER
"\"I was seen in  on 3/30 see [facility] for a sore throat.   My strep test and covid test were negative. But I was having some left ear pain then and the day after I left there it got much worse.   I usually get double ear infections.   I do not have a fever. I still have the cough.\"  Denies other sx  Triaged and advised to return to   Jillian Styles RN [facility]    COVID 19 Nurse Triage Plan/Patient Instructions    Please be aware that novel coronavirus (COVID-19) may be circulating in the community. If you develop symptoms such as fever, cough, or SOB or if you have concerns about the presence of another infection including coronavirus (COVID-19), please contact your health care provider or visit [email or web address].     Disposition/Instructions    In-Person Visit with provider recommended. Reference Visit Selection Guide.    Thank you for taking steps to prevent the spread of this virus.  o Limit your contact with others.  o Wear a simple mask to cover your cough.  o Wash your hands well and often.    Resources    [facility]: About COVID-19: [email or web address]    CDC: What to Do If You're Sick: www.cdc.gov/coronavirus/2019-ncov/about/steps-when-sick.html    CDC: Ending Home Isolation: www.cdc.gov/coronavirus/2019-ncov/hcp/disposition-in-home-patients.html     CDC: Caring for Someone: www.cdc.gov/coronavirus/2019-ncov/if-you-are-sick/care-for-someone.html     [facility]: Interim Guidance for Hospital Discharge to Home: www.health.[facility].[place].us/diseases/coronavirus/hcp/hospdischarge.pdf    [facility] clinical trials (COVID-19 research studies): [email or web address]/um-clinical-trials     Below are the COVID-19 hotlines at the [place] Department of Health ([facility]). Interpreters are available.   o For health questions: Call 061-279-1733 or 1-692.370.4794 (7 a.m. to 7 p.m.)  o For questions about schools and childcare: Call 796-063-1211 or 1-531.220.1546 (7 a.m. to 7 " p.m.)

## 2021-04-03 NOTE — PROGRESS NOTES
HPI: Patient is a 30-year-old female with a history of recurrent otitis media who is had ear pain for the last 48 hours.  She has had some recent upper respiratory symptoms including sinus congestion.  She had negative Covid testing 4 days ago.  Her left ear bothers her now with no symptoms of the right ear.  She did have some amoxicillin at home which she started 2 days ago.      ROS: See HPI otherwise normal.    Allergies   Allergen Reactions     Keflex [Cephalexin Monohydrate] Hives and Rash      Current Outpatient Medications   Medication Sig Dispense Refill     amoxicillin (AMOXIL) 500 MG capsule Take 1 capsule (500 mg) by mouth 3 times daily for 10 days 30 capsule 0     potassium chloride ER (KLOR-CON M) 20 MEQ CR tablet Take 40 mEq by mouth           PE: Patient alert and oriented.  Vital signs are normal-see chart.  Examination ears reveal both TMs to be partly erythematous and retracted.  No perforation.  ENT exam is otherwise normal.  Lungs are clear to auscultation throughout.  Heart is regular.  Skin warm and dry.        TREATMENT: None.      ASSESSMENT: Partly treated otitis media.  Obligation to extend treatment out for a full 10 days.      DIAGNOSIS: Bilateral otitis media.      PLAN: Amoxicillin 3 times daily for 10 days.  Recheck 1 week if no improvement, sooner if worse.

## 2021-04-04 ENCOUNTER — HEALTH MAINTENANCE LETTER (OUTPATIENT)
Age: 31
End: 2021-04-04

## 2021-04-07 ENCOUNTER — ANESTHESIA EVENT (OUTPATIENT)
Dept: SURGERY | Facility: CLINIC | Age: 31
End: 2021-04-07
Payer: COMMERCIAL

## 2021-04-08 DIAGNOSIS — Z01.812 ENCOUNTER FOR PREOPERATIVE SCREENING LABORATORY TESTING FOR COVID-19 VIRUS: ICD-10-CM

## 2021-04-08 DIAGNOSIS — Z11.52 ENCOUNTER FOR PREOPERATIVE SCREENING LABORATORY TESTING FOR COVID-19 VIRUS: ICD-10-CM

## 2021-04-08 LAB
SARS-COV-2 RNA RESP QL NAA+PROBE: NORMAL
SPECIMEN SOURCE: NORMAL

## 2021-04-08 PROCEDURE — U0003 INFECTIOUS AGENT DETECTION BY NUCLEIC ACID (DNA OR RNA); SEVERE ACUTE RESPIRATORY SYNDROME CORONAVIRUS 2 (SARS-COV-2) (CORONAVIRUS DISEASE [COVID-19]), AMPLIFIED PROBE TECHNIQUE, MAKING USE OF HIGH THROUGHPUT TECHNOLOGIES AS DESCRIBED BY CMS-2020-01-R: HCPCS | Performed by: OBSTETRICS & GYNECOLOGY

## 2021-04-08 PROCEDURE — U0005 INFEC AGEN DETEC AMPLI PROBE: HCPCS | Performed by: OBSTETRICS & GYNECOLOGY

## 2021-04-09 LAB
LABORATORY COMMENT REPORT: NORMAL
SARS-COV-2 RNA RESP QL NAA+PROBE: NEGATIVE
SPECIMEN SOURCE: NORMAL

## 2021-04-12 ENCOUNTER — ANESTHESIA (OUTPATIENT)
Dept: SURGERY | Facility: CLINIC | Age: 31
End: 2021-04-12
Payer: COMMERCIAL

## 2021-04-12 ENCOUNTER — HOSPITAL ENCOUNTER (OUTPATIENT)
Facility: CLINIC | Age: 31
Discharge: HOME | End: 2021-04-12
Attending: OBSTETRICS & GYNECOLOGY | Admitting: OBSTETRICS & GYNECOLOGY
Payer: COMMERCIAL

## 2021-04-12 VITALS
OXYGEN SATURATION: 97 % | HEIGHT: 60 IN | BODY MASS INDEX: 34.75 KG/M2 | DIASTOLIC BLOOD PRESSURE: 49 MMHG | TEMPERATURE: 97.9 F | WEIGHT: 177 LBS | HEART RATE: 54 BPM | RESPIRATION RATE: 16 BRPM | SYSTOLIC BLOOD PRESSURE: 94 MMHG

## 2021-04-12 DIAGNOSIS — Z90.79 STATUS POST BILATERAL SALPINGECTOMY: Primary | ICD-10-CM

## 2021-04-12 DIAGNOSIS — Z30.2 ENCOUNTER FOR STERILIZATION: ICD-10-CM

## 2021-04-12 DIAGNOSIS — T83.32XA IUD MIGRATION, INITIAL ENCOUNTER: ICD-10-CM

## 2021-04-12 PROBLEM — Z30.432 ENCOUNTER FOR REMOVAL OF INTRAUTERINE CONTRACEPTIVE DEVICE (IUD): Status: ACTIVE | Noted: 2021-04-12

## 2021-04-12 LAB
BASOPHILS # BLD AUTO: 0 10E9/L (ref 0–0.2)
BASOPHILS NFR BLD AUTO: 0.4 %
DIFFERENTIAL METHOD BLD: NORMAL
EOSINOPHIL # BLD AUTO: 0.3 10E9/L (ref 0–0.7)
EOSINOPHIL NFR BLD AUTO: 2.7 %
ERYTHROCYTE [DISTWIDTH] IN BLOOD BY AUTOMATED COUNT: 13.2 % (ref 10–15)
HCG UR QL: NEGATIVE
HCT VFR BLD AUTO: 44.2 % (ref 35–47)
HGB BLD-MCNC: 14.9 G/DL (ref 11.7–15.7)
IMM GRANULOCYTES # BLD: 0 10E9/L (ref 0–0.4)
IMM GRANULOCYTES NFR BLD: 0.3 %
LYMPHOCYTES # BLD AUTO: 2.2 10E9/L (ref 0.8–5.3)
LYMPHOCYTES NFR BLD AUTO: 20.9 %
MCH RBC QN AUTO: 30.2 PG (ref 26.5–33)
MCHC RBC AUTO-ENTMCNC: 33.7 G/DL (ref 31.5–36.5)
MCV RBC AUTO: 90 FL (ref 78–100)
MONOCYTES # BLD AUTO: 0.5 10E9/L (ref 0–1.3)
MONOCYTES NFR BLD AUTO: 4.4 %
NEUTROPHILS # BLD AUTO: 7.5 10E9/L (ref 1.6–8.3)
NEUTROPHILS NFR BLD AUTO: 71.3 %
NRBC # BLD AUTO: 0 10*3/UL
NRBC BLD AUTO-RTO: 0 /100
PLATELET # BLD AUTO: 288 10E9/L (ref 150–450)
POTASSIUM SERPL-SCNC: 2.6 MMOL/L (ref 3.4–5.3)
RBC # BLD AUTO: 4.94 10E12/L (ref 3.8–5.2)
WBC # BLD AUTO: 10.5 10E9/L (ref 4–11)

## 2021-04-12 PROCEDURE — 49329 UNLSTD LAPS PX ABD PERTM&OMN: CPT | Mod: 51 | Performed by: OBSTETRICS & GYNECOLOGY

## 2021-04-12 PROCEDURE — 250N000011 HC RX IP 250 OP 636: Performed by: NURSE ANESTHETIST, CERTIFIED REGISTERED

## 2021-04-12 PROCEDURE — 36415 COLL VENOUS BLD VENIPUNCTURE: CPT | Performed by: OBSTETRICS & GYNECOLOGY

## 2021-04-12 PROCEDURE — 36415 COLL VENOUS BLD VENIPUNCTURE: CPT | Performed by: NURSE ANESTHETIST, CERTIFIED REGISTERED

## 2021-04-12 PROCEDURE — 250N000009 HC RX 250: Performed by: NURSE ANESTHETIST, CERTIFIED REGISTERED

## 2021-04-12 PROCEDURE — 999N000105 HC STATISTIC NO DOCUMENTATION TO SUPPORT CHARGE

## 2021-04-12 PROCEDURE — 710N000009 HC RECOVERY PHASE 1, LEVEL 1, PER MIN: Performed by: OBSTETRICS & GYNECOLOGY

## 2021-04-12 PROCEDURE — 710N000012 HC RECOVERY PHASE 2, PER MINUTE: Performed by: OBSTETRICS & GYNECOLOGY

## 2021-04-12 PROCEDURE — 370N000017 HC ANESTHESIA TECHNICAL FEE, PER MIN: Performed by: OBSTETRICS & GYNECOLOGY

## 2021-04-12 PROCEDURE — 272N000001 HC OR GENERAL SUPPLY STERILE: Performed by: OBSTETRICS & GYNECOLOGY

## 2021-04-12 PROCEDURE — 58661 LAPAROSCOPY REMOVE ADNEXA: CPT | Performed by: OBSTETRICS & GYNECOLOGY

## 2021-04-12 PROCEDURE — 258N000003 HC RX IP 258 OP 636: Performed by: NURSE ANESTHETIST, CERTIFIED REGISTERED

## 2021-04-12 PROCEDURE — 250N000013 HC RX MED GY IP 250 OP 250 PS 637: Performed by: OBSTETRICS & GYNECOLOGY

## 2021-04-12 PROCEDURE — 999N000141 HC STATISTIC PRE-PROCEDURE NURSING ASSESSMENT: Performed by: OBSTETRICS & GYNECOLOGY

## 2021-04-12 PROCEDURE — 250N000013 HC RX MED GY IP 250 OP 250 PS 637: Performed by: NURSE ANESTHETIST, CERTIFIED REGISTERED

## 2021-04-12 PROCEDURE — 49329 UNLSTD LAPS PX ABD PERTM&OMN: CPT | Mod: 80 | Performed by: OBSTETRICS & GYNECOLOGY

## 2021-04-12 PROCEDURE — 81025 URINE PREGNANCY TEST: CPT | Performed by: OBSTETRICS & GYNECOLOGY

## 2021-04-12 PROCEDURE — 360N000077 HC SURGERY LEVEL 4, PER MIN: Performed by: OBSTETRICS & GYNECOLOGY

## 2021-04-12 PROCEDURE — 250N000011 HC RX IP 250 OP 636: Performed by: OBSTETRICS & GYNECOLOGY

## 2021-04-12 PROCEDURE — 88304 TISSUE EXAM BY PATHOLOGIST: CPT | Mod: TC | Performed by: OBSTETRICS & GYNECOLOGY

## 2021-04-12 PROCEDURE — 271N000001 HC OR GENERAL SUPPLY NON-STERILE: Performed by: OBSTETRICS & GYNECOLOGY

## 2021-04-12 PROCEDURE — 84132 ASSAY OF SERUM POTASSIUM: CPT | Performed by: NURSE ANESTHETIST, CERTIFIED REGISTERED

## 2021-04-12 PROCEDURE — 88302 TISSUE EXAM BY PATHOLOGIST: CPT | Mod: 26 | Performed by: PATHOLOGY

## 2021-04-12 PROCEDURE — 85025 COMPLETE CBC W/AUTO DIFF WBC: CPT | Performed by: OBSTETRICS & GYNECOLOGY

## 2021-04-12 PROCEDURE — 58661 LAPAROSCOPY REMOVE ADNEXA: CPT | Mod: 80 | Performed by: OBSTETRICS & GYNECOLOGY

## 2021-04-12 RX ORDER — ONDANSETRON 2 MG/ML
INJECTION INTRAMUSCULAR; INTRAVENOUS PRN
Status: DISCONTINUED | OUTPATIENT
Start: 2021-04-12 | End: 2021-04-12

## 2021-04-12 RX ORDER — NALOXONE HYDROCHLORIDE 0.4 MG/ML
0.2 INJECTION, SOLUTION INTRAMUSCULAR; INTRAVENOUS; SUBCUTANEOUS
Status: DISCONTINUED | OUTPATIENT
Start: 2021-04-12 | End: 2021-04-12 | Stop reason: HOSPADM

## 2021-04-12 RX ORDER — KETOROLAC TROMETHAMINE 30 MG/ML
INJECTION, SOLUTION INTRAMUSCULAR; INTRAVENOUS PRN
Status: DISCONTINUED | OUTPATIENT
Start: 2021-04-12 | End: 2021-04-12

## 2021-04-12 RX ORDER — FENTANYL CITRATE 50 UG/ML
INJECTION, SOLUTION INTRAMUSCULAR; INTRAVENOUS PRN
Status: DISCONTINUED | OUTPATIENT
Start: 2021-04-12 | End: 2021-04-12

## 2021-04-12 RX ORDER — FENTANYL CITRATE 50 UG/ML
25-50 INJECTION, SOLUTION INTRAMUSCULAR; INTRAVENOUS
Status: DISCONTINUED | OUTPATIENT
Start: 2021-04-12 | End: 2021-04-12 | Stop reason: HOSPADM

## 2021-04-12 RX ORDER — IPRATROPIUM BROMIDE AND ALBUTEROL SULFATE 2.5; .5 MG/3ML; MG/3ML
3 SOLUTION RESPIRATORY (INHALATION) ONCE
Status: DISCONTINUED | OUTPATIENT
Start: 2021-04-12 | End: 2021-04-12 | Stop reason: HOSPADM

## 2021-04-12 RX ORDER — NALOXONE HYDROCHLORIDE 0.4 MG/ML
0.4 INJECTION, SOLUTION INTRAMUSCULAR; INTRAVENOUS; SUBCUTANEOUS
Status: DISCONTINUED | OUTPATIENT
Start: 2021-04-12 | End: 2021-04-12 | Stop reason: HOSPADM

## 2021-04-12 RX ORDER — HYDROXYZINE HYDROCHLORIDE 25 MG/1
25 TABLET, FILM COATED ORAL EVERY 6 HOURS PRN
Status: DISCONTINUED | OUTPATIENT
Start: 2021-04-12 | End: 2021-04-12 | Stop reason: HOSPADM

## 2021-04-12 RX ORDER — MAGNESIUM SULFATE HEPTAHYDRATE 40 MG/ML
2 INJECTION, SOLUTION INTRAVENOUS ONCE
Status: COMPLETED | OUTPATIENT
Start: 2021-04-12 | End: 2021-04-12

## 2021-04-12 RX ORDER — LIDOCAINE 40 MG/G
CREAM TOPICAL
Status: DISCONTINUED | OUTPATIENT
Start: 2021-04-12 | End: 2021-04-12 | Stop reason: HOSPADM

## 2021-04-12 RX ORDER — ONDANSETRON 4 MG/1
4 TABLET, ORALLY DISINTEGRATING ORAL EVERY 30 MIN PRN
Status: DISCONTINUED | OUTPATIENT
Start: 2021-04-12 | End: 2021-04-12 | Stop reason: HOSPADM

## 2021-04-12 RX ORDER — IBUPROFEN 800 MG/1
800 TABLET, FILM COATED ORAL EVERY 6 HOURS PRN
Qty: 30 TABLET | Refills: 0 | Status: SHIPPED | OUTPATIENT
Start: 2021-04-12 | End: 2022-06-06

## 2021-04-12 RX ORDER — ACETAMINOPHEN 325 MG/1
975 TABLET ORAL ONCE
Status: DISCONTINUED | OUTPATIENT
Start: 2021-04-12 | End: 2021-04-12 | Stop reason: HOSPADM

## 2021-04-12 RX ORDER — HYDROMORPHONE HYDROCHLORIDE 1 MG/ML
.3-.5 INJECTION, SOLUTION INTRAMUSCULAR; INTRAVENOUS; SUBCUTANEOUS EVERY 10 MIN PRN
Status: DISCONTINUED | OUTPATIENT
Start: 2021-04-12 | End: 2021-04-12 | Stop reason: HOSPADM

## 2021-04-12 RX ORDER — DEXAMETHASONE SODIUM PHOSPHATE 4 MG/ML
INJECTION, SOLUTION INTRA-ARTICULAR; INTRALESIONAL; INTRAMUSCULAR; INTRAVENOUS; SOFT TISSUE PRN
Status: DISCONTINUED | OUTPATIENT
Start: 2021-04-12 | End: 2021-04-12

## 2021-04-12 RX ORDER — ACETAMINOPHEN 325 MG/1
975 TABLET ORAL ONCE
Status: COMPLETED | OUTPATIENT
Start: 2021-04-12 | End: 2021-04-12

## 2021-04-12 RX ORDER — POTASSIUM CHLORIDE 1500 MG/1
40 TABLET, EXTENDED RELEASE ORAL ONCE
Status: COMPLETED | OUTPATIENT
Start: 2021-04-12 | End: 2021-04-12

## 2021-04-12 RX ORDER — SODIUM CHLORIDE, SODIUM LACTATE, POTASSIUM CHLORIDE, CALCIUM CHLORIDE 600; 310; 30; 20 MG/100ML; MG/100ML; MG/100ML; MG/100ML
INJECTION, SOLUTION INTRAVENOUS CONTINUOUS
Status: DISCONTINUED | OUTPATIENT
Start: 2021-04-12 | End: 2021-04-12 | Stop reason: HOSPADM

## 2021-04-12 RX ORDER — PROPOFOL 10 MG/ML
INJECTION, EMULSION INTRAVENOUS CONTINUOUS PRN
Status: DISCONTINUED | OUTPATIENT
Start: 2021-04-12 | End: 2021-04-12

## 2021-04-12 RX ORDER — ONDANSETRON 4 MG/1
4-8 TABLET, ORALLY DISINTEGRATING ORAL EVERY 8 HOURS PRN
Qty: 4 TABLET | Refills: 0 | Status: SHIPPED | OUTPATIENT
Start: 2021-04-12 | End: 2021-04-22

## 2021-04-12 RX ORDER — MEPERIDINE HYDROCHLORIDE 25 MG/ML
12.5 INJECTION INTRAMUSCULAR; INTRAVENOUS; SUBCUTANEOUS
Status: DISCONTINUED | OUTPATIENT
Start: 2021-04-12 | End: 2021-04-12 | Stop reason: HOSPADM

## 2021-04-12 RX ORDER — LIDOCAINE HYDROCHLORIDE 10 MG/ML
INJECTION, SOLUTION INFILTRATION; PERINEURAL PRN
Status: DISCONTINUED | OUTPATIENT
Start: 2021-04-12 | End: 2021-04-12

## 2021-04-12 RX ORDER — AMOXICILLIN 250 MG
1-2 CAPSULE ORAL 2 TIMES DAILY
Qty: 30 TABLET | Refills: 0 | Status: SHIPPED | OUTPATIENT
Start: 2021-04-12 | End: 2021-04-22

## 2021-04-12 RX ORDER — OXYCODONE HYDROCHLORIDE 5 MG/1
5-10 TABLET ORAL EVERY 4 HOURS PRN
Qty: 10 TABLET | Refills: 0 | Status: SHIPPED | OUTPATIENT
Start: 2021-04-12 | End: 2021-04-16

## 2021-04-12 RX ORDER — PROPOFOL 10 MG/ML
INJECTION, EMULSION INTRAVENOUS PRN
Status: DISCONTINUED | OUTPATIENT
Start: 2021-04-12 | End: 2021-04-12

## 2021-04-12 RX ORDER — KETAMINE HYDROCHLORIDE 10 MG/ML
INJECTION, SOLUTION INTRAMUSCULAR; INTRAVENOUS PRN
Status: DISCONTINUED | OUTPATIENT
Start: 2021-04-12 | End: 2021-04-12

## 2021-04-12 RX ORDER — ONDANSETRON 2 MG/ML
4 INJECTION INTRAMUSCULAR; INTRAVENOUS EVERY 30 MIN PRN
Status: DISCONTINUED | OUTPATIENT
Start: 2021-04-12 | End: 2021-04-12 | Stop reason: HOSPADM

## 2021-04-12 RX ORDER — ALBUTEROL SULFATE 0.83 MG/ML
2.5 SOLUTION RESPIRATORY (INHALATION) ONCE
Status: COMPLETED | OUTPATIENT
Start: 2021-04-12 | End: 2021-04-12

## 2021-04-12 RX ORDER — BUPIVACAINE HYDROCHLORIDE 2.5 MG/ML
INJECTION, SOLUTION INFILTRATION; PERINEURAL PRN
Status: DISCONTINUED | OUTPATIENT
Start: 2021-04-12 | End: 2021-04-12 | Stop reason: HOSPADM

## 2021-04-12 RX ORDER — GLYCOPYRROLATE 0.2 MG/ML
INJECTION, SOLUTION INTRAMUSCULAR; INTRAVENOUS PRN
Status: DISCONTINUED | OUTPATIENT
Start: 2021-04-12 | End: 2021-04-12

## 2021-04-12 RX ORDER — HYDROXYZINE HYDROCHLORIDE 50 MG/1
50 TABLET, FILM COATED ORAL EVERY 6 HOURS PRN
Status: DISCONTINUED | OUTPATIENT
Start: 2021-04-12 | End: 2021-04-12 | Stop reason: HOSPADM

## 2021-04-12 RX ADMIN — SUGAMMADEX 150 MG: 100 INJECTION, SOLUTION INTRAVENOUS at 11:12

## 2021-04-12 RX ADMIN — POTASSIUM CHLORIDE 40 MEQ: 20 TABLET, EXTENDED RELEASE ORAL at 13:42

## 2021-04-12 RX ADMIN — ONDANSETRON 4 MG: 2 INJECTION INTRAMUSCULAR; INTRAVENOUS at 11:12

## 2021-04-12 RX ADMIN — HYDROXYZINE HYDROCHLORIDE 25 MG: 25 TABLET, FILM COATED ORAL at 12:06

## 2021-04-12 RX ADMIN — DEXAMETHASONE SODIUM PHOSPHATE 8 MG: 4 INJECTION, SOLUTION INTRA-ARTICULAR; INTRALESIONAL; INTRAMUSCULAR; INTRAVENOUS; SOFT TISSUE at 10:37

## 2021-04-12 RX ADMIN — ALBUTEROL SULFATE 2.5 MG: 2.5 SOLUTION RESPIRATORY (INHALATION) at 11:43

## 2021-04-12 RX ADMIN — PROPOFOL 300 MCG/KG/MIN: 10 INJECTION, EMULSION INTRAVENOUS at 10:37

## 2021-04-12 RX ADMIN — ACETAMINOPHEN 975 MG: 325 TABLET, FILM COATED ORAL at 09:51

## 2021-04-12 RX ADMIN — KETAMINE HYDROCHLORIDE 20 MG: 10 INJECTION INTRAMUSCULAR; INTRAVENOUS at 10:55

## 2021-04-12 RX ADMIN — MAGNESIUM SULFATE HEPTAHYDRATE 2 G: 40 INJECTION, SOLUTION INTRAVENOUS at 10:31

## 2021-04-12 RX ADMIN — KETOROLAC TROMETHAMINE 15 MG: 30 INJECTION, SOLUTION INTRAMUSCULAR at 11:12

## 2021-04-12 RX ADMIN — ROCURONIUM BROMIDE 40 MG: 10 INJECTION INTRAVENOUS at 10:37

## 2021-04-12 RX ADMIN — SODIUM CHLORIDE, POTASSIUM CHLORIDE, SODIUM LACTATE AND CALCIUM CHLORIDE: 600; 310; 30; 20 INJECTION, SOLUTION INTRAVENOUS at 09:56

## 2021-04-12 RX ADMIN — KETAMINE HYDROCHLORIDE 30 MG: 10 INJECTION INTRAMUSCULAR; INTRAVENOUS at 10:37

## 2021-04-12 RX ADMIN — SODIUM CHLORIDE 20 MCG: 9 INJECTION, SOLUTION INTRAVENOUS at 10:37

## 2021-04-12 RX ADMIN — LIDOCAINE HYDROCHLORIDE 1 ML: 10 INJECTION, SOLUTION EPIDURAL; INFILTRATION; INTRACAUDAL; PERINEURAL at 09:57

## 2021-04-12 RX ADMIN — FENTANYL CITRATE 50 MCG: 50 INJECTION, SOLUTION INTRAMUSCULAR; INTRAVENOUS at 12:07

## 2021-04-12 RX ADMIN — LIDOCAINE HYDROCHLORIDE 100 MG: 10 INJECTION, SOLUTION INFILTRATION; PERINEURAL at 10:37

## 2021-04-12 RX ADMIN — MAGNESIUM SULFATE HEPTAHYDRATE 2 G: 40 INJECTION, SOLUTION INTRAVENOUS at 09:55

## 2021-04-12 RX ADMIN — FENTANYL CITRATE 100 MCG: 50 INJECTION, SOLUTION INTRAMUSCULAR; INTRAVENOUS at 10:37

## 2021-04-12 RX ADMIN — PROPOFOL 150 MG: 10 INJECTION, EMULSION INTRAVENOUS at 10:37

## 2021-04-12 RX ADMIN — GLYCOPYRROLATE 0.2 MG: 0.2 INJECTION, SOLUTION INTRAMUSCULAR; INTRAVENOUS at 10:37

## 2021-04-12 RX ADMIN — HYDROMORPHONE HYDROCHLORIDE 1 MG: 1 INJECTION, SOLUTION INTRAMUSCULAR; INTRAVENOUS; SUBCUTANEOUS at 11:05

## 2021-04-12 ASSESSMENT — LIFESTYLE VARIABLES: TOBACCO_USE: 1

## 2021-04-12 ASSESSMENT — MIFFLIN-ST. JEOR: SCORE: 1438.34

## 2021-04-12 NOTE — OR NURSING
Pt arrived to facility drinking fluid, gatorade. Pt stated she read that she was supposed to drink 1 hour before surgery.  Pt called home and had her sister check again and the paper work read two hours before surgery.  Anesthesia is aware of NPO status and surgery will be delayed for one hour.  Explained to pt and pts sister. Both are agreeable to this.

## 2021-04-12 NOTE — ANESTHESIA PROCEDURE NOTES
Airway       Patient location during procedure: OR  Staff -        CRNA: Wendy Branch APRN CRNA       Performed By: CRNA  Consent for Airway        Urgency: elective  Indications and Patient Condition       Indications for airway management: yara-procedural and airway protection       Induction type:intravenous       Mask difficulty assessment: 1 - vent by mask    Final Airway Details       Final airway type: endotracheal airway       Successful airway: ETT - single  Endotracheal Airway Details        ETT size (mm): 7.0       Cuffed: yes       Successful intubation technique: video laryngoscopy       VL Blade Size: [place] 3       Grade View of Cords: 1       Adjucts: stylet       Position: Right       Measured from: gums/teeth       Secured at (cm): 22       Bite block used: None    Post intubation assessment        Placement verified by: capnometry, equal breath sounds and chest rise        Number of attempts at approach: 1       Number of other approaches attempted: 0       Secured with: silk tape       Ease of procedure: easy       Dentition: Intact    Medication(s) Administered   Medication Administration Time: 4/12/2021 10:40 AM
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
## 2021-04-12 NOTE — ANESTHESIA CARE TRANSFER NOTE
Patient: Mora Reardon    Procedure(s):  SALPINGECTOMY, LAPAROSCOPIC LAPAROSCOPIC REMOVAL OF DISPLACED INTRAUTERINE DEVICE.    Diagnosis: IUD migration, initial encounter [T83.32XA]  Encounter for sterilization [Z30.2]  Diagnosis Additional Information: No value filed.    Anesthesia Type:   General     Note:    Oropharynx: oropharynx clear of all foreign objects  Level of Consciousness: drowsy  Oxygen Supplementation: face mask    Independent Airway: airway patency satisfactory and stable  Dentition: dentition unchanged  Vital Signs Stable: post-procedure vital signs reviewed and stable  Report to RN Given: handoff report given  Patient transferred to: PACU    Handoff Report: Identifed the Patient, Identified the Reponsible Provider, Reviewed the pertinent medical history, Discussed the surgical course, Reviewed Intra-OP anesthesia mangement and issues during anesthesia, Set expectations for post-procedure period and Allowed opportunity for questions and acknowledgement of understanding      Vitals: (Last set prior to Anesthesia Care Transfer)  CRNA VITALS  4/12/2021 1105 - 4/12/2021 1136      4/12/2021             Pulse:  76    SpO2:  96 %    Resp Rate (observed):  (!) 4        Electronically Signed By: ADELAIDE Guajardo CRNA  April 12, 2021  11:36 AM

## 2021-04-12 NOTE — BRIEF OP NOTE
[facility]    Brief Operative Note    Pre-operative diagnosis: IUD migration, initial encounter [T83.32XA]  Encounter for sterilization [Z30.2]  Post-operative diagnosis S/p laparoscopic bilateral salpingectomy, removal of intra-abdominal IUD    Procedure: Procedure(s):  SALPINGECTOMY, LAPAROSCOPIC LAPAROSCOPIC REMOVAL OF DISPLACED INTRAUTERINE DEVICE.  Surgeon: Surgeon(s) and Role:     * Isabel Sherwood DO - Primary     * Abhinav New MD - Assisting  Anesthesia: General   Estimated blood loss: 5cc  Drains: None  Specimens:   ID Type Source Tests Collected by Time Destination   A : Bilateral Fallopian Tubes Tissue Fallopian Tube, Bilateral SURGICAL PATHOLOGY EXAM Isabel Sherwood DO 4/12/2021 11:08 AM      Findings:   Intrauterine device located embedded in the omentum in the left lower quadrant. Normal appearing uterus, bilateral fallopian tubes and ovaries. No other intraabdominal pathology identified. .  Complications: None.  Implants: * No implants in log *      Isabel Sherwood DO

## 2021-04-12 NOTE — DISCHARGE INSTRUCTIONS
Same Day Surgery Discharge Instructions  Special Precautions After Surgery - Adult    1. It is not unusual to feel lightheaded or faint, up to 24 hours after surgery or while taking pain medication.  If you have these symptoms; sit for a few minutes before standing and have someone assist you when getting up.  2. You should rest and relax for the next 24 hours and must have someone stay with you for at least 24 hours after your discharge.  3. DO NOT DRIVE any vehicle or operate mechanical equipment for 24 hours following the end of your surgery.  DO NOT DRIVE while taking narcotic pain medications that have been prescribed by your physician.  If you had a limb operated on, you must be able to use it fully to drive.  4. DO NOT drink alcoholic beverages for 24 hours following surgery or while taking prescription pain medication.  5. Drink clear liquids (apple juice, ginger ale, broth, 7-Up, etc.).  Progress to your regular diet as you feel able.  6. Any questions call your physician and do not make important decisions for 24 hours.        Breakthrough Bleeding    How/why does it occur?   There are many causes of breakthrough bleeding onto your dressing. The two most common causes are increased activity and increased NSAID use.     How is it treated?   The treatment for breakthrough dressing bleeding depends on the cause. For simple problems such as a saturated dressing, you may need to reinforce the dressing with more gauze and tape and put slight pressure on the site.  Call your healthcare provider if something else is causing bleeding.      Nausea and Vomiting: Nausea and vomiting can occur any time after receiving anesthesia. If you experience nausea and vomiting we encourage you to move to a clear liquid diet and advance your diet as tolerated. If nausea and vomiting do not improve within 12 hours please call the surgeon or present to the Emergency department.     Break-through Bleeding: If  your experience bleeding from your surgical site apply pressure and additional dressing per nurse instruction. For simple problems such as a saturated dressing, you may need to reinforce the dressing with more gauze and tape and put slight pressure on the site. If bleeding does not subside contact the surgeon or present to the Emergency Department.    Post-op Infection: If you develop a fever of 100.4 or greater, have pus like drainage, redness, swelling or severe pain at the surgical site not alleviated with pain medications; please contact the surgeon or present to the Emergency Department.  Post-operative Infection  What is a wound infection?    watch for signs of infection. Signs that the wound/incision is infected include:   Pus or cloudy fluid draining from the incision.   A pimple or yellow crust forming on the incision   The scab is increasing in size.   Increasing redness occurs around the incisions  A red streak is spreading from the wound toward the heart.   The incision has become extremely tender and/or hot   The lymph node draining that area of skin may become large and tender.   You may develop a fever over 100?F (37.8?C).     What is the cause?   Most skin infections follow breaks in the skin (for example, surgery,  from cuts, puncture wounds, animal bites, splinters, thorns, or burns). Bacteria (especially staphylococcus or streptococcus) then invade the wound and cause the infection.    Deeper wounds (like surgical incisions) are much more likely to become infected than superficial wounds (for example, scrapes).     What is the treatment?   Call your doctor's clinic if you feel you have the beginnings of an infection   Antibiotics You will probably need antibiotics prescribed by your healthcare provider. This medicine will kill the germs that are causing the wound infection. Try not to forget any of the doses.  Even if you feel better in a few days, take the antibiotic until it is completely gone  to keep the infection from flaring up again.    Fever and pain relief Take acetaminophen or ibuprofen if you develop a fever over 102?F (39?C)    How can I help prevent infections?   Wash around all new incisions vigorously with soap and water for 5 to 10 minutes to remove dirt and bacteria.      When should I call my healthcare provider?   Call IMMEDIATELY if:   The redness keeps spreading.   The wound becomes extremely painful.   Call during office hours if:   The fever is not gone 48 hours after you start taking an antibiotic.   The wound infection does not look better 3 days after your start taking an antibiotic.   The wound isn't completely healed within 10 days.   You have other questions or concerns.     __________________________________________________________________________________________________________________________________  IMPORTANT NUMBERS:    [facility] [place] [facility] [place]:  [phone], [phone]  Pharmacy:  [phone]  Same Day Surgery:  [phone], Monday - Friday until 8:30 p.m.  Urgent Care:  [phone]  Emergency Room:  [phone]      [facility]:  [phone]                                                                             [facility]:  [phone] option 1  [facility]:  [phone]     OB Clinic:  [phone]   [facility]:  [phone]   Home Medical Equipment: [phone]  [facility]:  [phone]

## 2021-04-12 NOTE — OP NOTE
[facility] Operative Note    Patient Name: Mora Reardon  MRN:[id number]  : 1990  Date of Surgery: 21   Pre-operative diagnosis:  IUD migration, initial encounter [T83.32XA]  Encounter for sterilization [Z30.2]   Post-operative diagnosis:  S/p laparoscopic bilateral salpingectomy, removal of intra-abdominal IUD   Procedure:  Procedure(s):  SALPINGECTOMY, LAPAROSCOPIC LAPAROSCOPIC REMOVAL OF DISPLACED INTRAUTERINE DEVICE.   Surgeon:  Dr. Isabel Sherwood (OB/GYN Attending Staff)    Assistant(s):  Dr. Abhinav New  This assistance of this surgeon was required due to the need for additional skilled surgical hands to retract and hold instruments due to the nature of the surgical procedure and risk of complications.      Anesthesia:  General   Estimated blood loss:  5cc   Total IV fluids:  800ml crystaloid    Blood transfusion:  No transfusion was given during surgery    Total urine output:  50 mL    Drains:  None   Specimens:  ID Type Source Tests Collected by Time Destination   A : Bilateral Fallopian Tubes Tissue Fallopian Tube, Bilateral SURGICAL PATHOLOGY EXAM Isabel Sherwood DO 2021 11:08 AM       Indication:  Patient is a 30 year old  with history of displaced intrauterine device and desire for permanent sterilization. Imaging revealed intra abominal location. Decision made to proceed to the OR for laparoscopic removal of IUD as well as bilateral salpingectomy.     Prior to procedure risks benefits, complications, and alternatives were discussed with the patient.  Verbal and written consent were obtained.   Complications:  None apparent   Condition:  Stable, transferred to PACU        Findings: External Genitalia: Normal appearing female genitalia.  Bimanual Examination: The uterus was noted to be retroverted, approximately 6 wks size, freely mobile.  No adnexal masses or fullness were appreciated. Unable to palpate IUD abdominally.  Laparoscopy: IUD  located embedded in the omentum in the left lower quadrant. Normal appearing uterus, bilateral fallopian tubes and ovaries. No other intraabdominal pathology identified.    Procedure:    Patient was met in the preoperative suite, where history and physical were updated and consent obtained.  Patient was taken to the operative suite where general anesthesia was administered without difficulty. Patient was then positioned in the dorsal lithotomy position, with special attention to avoid exaggeration of flexion or extension of the legs. She was then prepped and draped in the usual sterile fashion. Straight catheterization of the bladder was performed. Time out was performed to confirm correct patient and procedure. Bimanual exam was performed with the above noted findings.     The speculum was placed in the vagina and the anterior lip of the cervix was grasped with the single toothed tenaculum. The cervix was then serially dilated to accommodate the uterine manipulator.  The uterine manipulator was placed without difficulty. Gloves were changed and attention was turned to the abdominal portion of the case. A 5 mm infraumbilical incision was made.  The Veress needle then used to enter the peritoneal cavity, without difficulty. Intraperitoneal placement was confirmed with the drop test. Insufflation of the abdomen ensued, with pneumoperitoneum being established.  At this time a 5 mm trocar and port was introduced into the peritoneal cavity without difficulty. The laparoscope was then introduced with a thorough evaluation of the abdomen being performed and the above noted findings. A 5 mm RLQ incision was then created and an 5 mm port and trocar were introduced under direct visualization. Attention was turned suprapubically where a 5 mm incision was then created and another 5 mm trocar and port were introduced also under direct visualization. Prior to skin incisions, 0.25% Marcaine was injected at all port sites.    The  abdomen was then inspected in order to locate the intrauterine device. The IUD was located embedded in the omentum in the left lower quadrant. This was dissected from the omentum using the Ligasure device to ensure excellent hemostasis. Once freed, the IUD was removed from the abdomen in its entirety, intact, and disposed of. Attention was then turned to the adnexa to perform salpingectomy. The left fallopian tube was carried out to its fimbriated and the 5 mm LigaSure was used to clamp, cauterize and cut the mesosalpinx at the area just below the fallopian tube. The fallopian tube was then removed through the port without difficulty and sent to pathology. Attention was then turned to the right fallopian tube which was carried out to its fimbriated end and elevated.  The LigaSure was used to clamp, cauterize, and cut at the level just below the fallopian tube. The tube was then removed through the port without difficulty and sent to pathology.  Pictures of findings were obtained. A full survey of the abdomen was performed with findings as listed above. Excellent hemostasis again observed from the omentum as well as the mesosalpinx. All instruments were then removed from the abdomen and the ports were removed from the abdominal wall. The incisions were closed with #4-0 undyed Vicryl in a running subcuticular fashion. Skin glue was placed.    Attention was then turned to the perineal region, where the uterine manipulator was removed from the cervix with excellent hemostasis noted.  All instruments were removed from the vagina.  At the completion of the procedure all sponge, lap, and needle counts were correct x2.   At the conclusion of the procedure a debrief was performed.  The patient was awoken from anesthesia and extubated without any complication.  She was sent to recovery in stable condition.       [facility]

## 2021-04-12 NOTE — OR NURSING
Pt. Potassium low.  KEITH Branch ordered 40 meq of Potassium PO ..Administeredin PH II. Runs low BP.  Uneventful otherwise.

## 2021-04-12 NOTE — ANESTHESIA POSTPROCEDURE EVALUATION
Patient: Mora Reardon    Procedure(s):  SALPINGECTOMY, LAPAROSCOPIC LAPAROSCOPIC REMOVAL OF DISPLACED INTRAUTERINE DEVICE.    Diagnosis:IUD migration, initial encounter [T83.32XA]  Encounter for sterilization [Z30.2]  Diagnosis Additional Information: No value filed.    Anesthesia Type:  General    Note:  Disposition: Outpatient   Postop Pain Control: Uneventful            Sign Out: Well controlled pain   PONV: No   Neuro/Psych: Uneventful            Sign Out: Acceptable/Baseline neuro status   Airway/Respiratory: Uneventful            Sign Out: Acceptable/Baseline resp. status   CV/Hemodynamics: Uneventful            Sign Out: Acceptable CV status   Other NRE: NONE   DID A NON-ROUTINE EVENT OCCUR? No         Last vitals:  Vitals:    04/12/21 1215 04/12/21 1230 04/12/21 1300   BP: 96/54 95/62 (!) 92/38   Pulse: 50 (!) 48 51   Resp: 10 23 20   Temp:      SpO2: 99% 98% 96%       Last vitals prior to Anesthesia Care Transfer:  CRNA VITALS  4/12/2021 1105 - 4/12/2021 1205      4/12/2021             Pulse:  76    SpO2:  96 %    Resp Rate (observed):  (!) 4          Electronically Signed By: ADELAIDE Guajardo CRNA  April 12, 2021  1:53 PM
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
## 2021-04-13 LAB — COPATH REPORT: NORMAL

## 2021-04-16 ENCOUNTER — MYC REFILL (OUTPATIENT)
Dept: FAMILY MEDICINE | Facility: CLINIC | Age: 31
End: 2021-04-16

## 2021-04-16 ENCOUNTER — TELEPHONE (OUTPATIENT)
Dept: OBGYN | Facility: CLINIC | Age: 31
End: 2021-04-16

## 2021-04-16 DIAGNOSIS — Z90.79 STATUS POST BILATERAL SALPINGECTOMY: ICD-10-CM

## 2021-04-16 RX ORDER — OXYCODONE HYDROCHLORIDE 5 MG/1
5-10 TABLET ORAL EVERY 4 HOURS PRN
Qty: 10 TABLET | Refills: 0 | Status: CANCELLED | OUTPATIENT
Start: 2021-04-16

## 2021-04-16 NOTE — TELEPHONE ENCOUNTER
Return call to pt.  Unable to reach.  Left message for pt to return call to clinic.    Maria Eugenia Arguello   [facility]  RN

## 2021-04-16 NOTE — TELEPHONE ENCOUNTER
Reason for call:  Patient reporting a symptom    Symptom or request: Pt had surgery on 4-12-21.  Still having pain - has 2 oxi's left but doesn't know if it should be getting better by then or?  Has some ibuprofen's as well.    Duration (how long have symptoms been present):     Have you been treated for this before? No    Additional comments:     Phone Number patient can be reached at:  Home number on file [phone] (home)    Best Time:      Can we leave a detailed message on this number:  YES    Call taken on 4/16/2021 at 10:50 AM by Lisette Neff

## 2021-04-22 ENCOUNTER — ANCILLARY PROCEDURE (OUTPATIENT)
Dept: GENERAL RADIOLOGY | Facility: CLINIC | Age: 31
End: 2021-04-22
Attending: FAMILY MEDICINE
Payer: COMMERCIAL

## 2021-04-22 ENCOUNTER — OFFICE VISIT (OUTPATIENT)
Dept: FAMILY MEDICINE | Facility: CLINIC | Age: 31
End: 2021-04-22
Payer: COMMERCIAL

## 2021-04-22 VITALS
WEIGHT: 177 LBS | DIASTOLIC BLOOD PRESSURE: 62 MMHG | RESPIRATION RATE: 18 BRPM | TEMPERATURE: 98.6 F | HEART RATE: 70 BPM | SYSTOLIC BLOOD PRESSURE: 100 MMHG | BODY MASS INDEX: 34.75 KG/M2 | HEIGHT: 60 IN

## 2021-04-22 DIAGNOSIS — M43.10 ANTEROLISTHESIS: ICD-10-CM

## 2021-04-22 DIAGNOSIS — M54.50 CHRONIC RIGHT-SIDED LOW BACK PAIN, UNSPECIFIED WHETHER SCIATICA PRESENT: Primary | ICD-10-CM

## 2021-04-22 DIAGNOSIS — M54.50 CHRONIC RIGHT-SIDED LOW BACK PAIN, UNSPECIFIED WHETHER SCIATICA PRESENT: ICD-10-CM

## 2021-04-22 DIAGNOSIS — G89.29 CHRONIC RIGHT-SIDED LOW BACK PAIN, UNSPECIFIED WHETHER SCIATICA PRESENT: Primary | ICD-10-CM

## 2021-04-22 DIAGNOSIS — G89.29 CHRONIC RIGHT-SIDED LOW BACK PAIN, UNSPECIFIED WHETHER SCIATICA PRESENT: ICD-10-CM

## 2021-04-22 PROCEDURE — 72100 X-RAY EXAM L-S SPINE 2/3 VWS: CPT | Performed by: RADIOLOGY

## 2021-04-22 PROCEDURE — 99214 OFFICE O/P EST MOD 30 MIN: CPT | Performed by: FAMILY MEDICINE

## 2021-04-22 ASSESSMENT — MIFFLIN-ST. JEOR: SCORE: 1438.34

## 2021-04-22 NOTE — PROGRESS NOTES
"  Assessment & Plan     Chronic right-sided low back pain, unspecified whether sciatica present   Anterolisthesis  Differentials discussed in detail including symptoms related to spondylolisthesis.  X-ray findings reviewed independently which showed spondylolytic anterolisthesis of L5 upon S1 vertebrae.  Physical examination as described.  Suggested to continue heat/ice, over-the-counter topical/oral analgesia and activity as tolerated.  Orthopedic referral placed for further review recommendations.  All questions answered.  - XR Lumbar Spine 2/3 Views; Future  - Orthopedic & Spine  Referral; Future       Tobacco Cessation:   reports that she has been smoking cigarettes. She has a 3.00 pack-year smoking history. She has never used smokeless tobacco.  Tobacco Cessation Action Plan: Information offered: Patient not interested at this time    BMI:   Estimated body mass index is 34.28 kg/m  as calculated from the following:    Height as of this encounter: 1.53 m (5' 0.25\").    Weight as of this encounter: 80.3 kg (177 lb).   Weight management plan: Discussed healthy diet and exercise guidelines    Patient Instructions     Patient Education     Back Care Tips     Caring for your back  These are things you can do to prevent a recurrence of acute back pain and to reduce symptoms from chronic back pain:    Stay at a healthy weight. If you are overweight, losing weight will help most types of back pain.    Exercise is an important part of recovery from most types of back pain. The muscles behind and in front of the spine support the back. This means strengthening both the back muscles and the abdominal muscles will provide better support for your spine.     Swimming and brisk walking are good overall exercises to improve your fitness level.    Practice safe lifting methods (see below).    Practice good posture when sitting, standing, and walking. Don't sit for a long time. This puts more stress on the lower back " than standing or walking.    Wear quality shoes with good arch support. Foot and ankle alignment can affect back symptoms. Don't wear high heels.    Therapeutic massage can help relax the back muscles without stretching them.    During the first 24 to 72 hours after an acute injury or flare-up of chronic back pain, put an ice pack on the painful area for 20 minutes and then remove it for 20 minutes. Do thisover a period of 60 to 90 minutes, or several times a day. As a safety precaution, don't use a heating pad at bedtime. Sleeping on a heating pad can lead to skin burns or tissue damage.    You can alternate using ice and heat.  Medicines  Talk with your healthcare provider before using medicines, especially if you have other health problems or are taking other medicines.    You may use over-the-counter medicines, such as acetaminophen, ibuprofen, or naprosyn to control pain, unless your healthcare provider prescribed other pain medicine. Talk with your healthcare provider before taking any medicines if you have a chronic condition such as diabetes, liver or kidney disease, stomach ulcers, or digestive bleeding, or are taking blood thinners.    Be careful if you are given prescription pain medicines, opioids, or medicine for muscle spasm. They can cause drowsiness, and affect your coordination, reflexes, and judgment. Don't drive or operate heavy machinery while taking these types of medicines. Take prescription pain medicine only as prescribed by your healthcare provider.  Lumbar stretch  This simple stretch will help relax muscle spasm and keep your back more limber. If exercise makes your back pain worse, don t do it.    Lie on your back with your knees bent and both feet on the ground.    Slowly raise your left knee to your chest as you flatten your lower back against the floor. Hold for 5 seconds.    Relax and repeat the exercise with your right knee.    Do 10 of these exercises for each leg.  Safe lifting  method    Don t bend over at the waist to lift an object off the floor.  Instead, bend your knees and hips in a squat.     Keep your back and head upright    Hold the object close to your body, directly in front of you.    Straighten your legs to lift the object.     Lower the object to the floor in the reverse fashion.    If you must slide something across the floor, push it.    Posture tips  Sitting  Sit in chairs with straight backs or low-back support. Keep your knees lower than your hips, with your feet flat on the floor.  When driving, sit up straight. Adjust the seat forward so you are not leaning toward the steering wheel.  A small pillow or rolled towel behind your lower back may help if you are driving long distances.   Standing  When standing for long periods, shift most of your weight to one leg at a time. Switch legs every few minutes.   Sleeping  The best way to sleep is on your side with your knees bent. Put a low pillow under your head to support your neck in a neutral spine position. Don't use thick pillows that bend your neck to one side. Put a pillow between your legs to further relax your lower back. If you sleep on your back, put pillows under your knees to support your legs in a slightly flexed position. Use a firm mattress. If your mattress sags, replace it, or use a 1/2-inch plywood board under the mattress to add support.  Follow-up care  Follow up with your healthcare provider, or as advised.  If X-rays, a CT scan or an MRI scan were taken, they may be reviewed by a radiologist. You will be told of any new findings that may affect your care.  Call 911  Call 911 if any of the following occur:    Trouble breathing    Confusion    Very drowsy    Fainting or loss of consciousness    Rapid or very slow heart rate    Loss of  bowel or bladder control  When to seek medical advice  Call your healthcare provider right away if any of the following occur:    Pain becomes worse or spreads to your arms  "or legs    Weakness or numbness in one or both arms or legs    Numbness in the groin area  [employer] last reviewed this educational content on 11/1/2019 2000-2021 [employer]. All rights reserved. This information is not intended as a substitute for professional medical care. Always follow your healthcare professional's instructions.             Uri Alcocer MD  [facility]    Nadeen Velazco is a 30 year old who presents for the following health issues     HPI     Concern - Numbness   Onset: 2017- getting worse since surgery on 4/12/21  Description: right thigh is numb.  Pins and needles feeling - does have a few back issues when she bends- not sure if that is causing her leg issue or not.   Intensity: moderate  Progression of Symptoms:  worsening  Therapies tried and outcome:  none   Works as a  at [employer]      Review of Systems   Constitutional, HEENT, cardiovascular, pulmonary, gi and gu systems are negative, except as otherwise noted.      Objective    /62 (Cuff Size: Adult Regular)   Pulse 70   Temp 98.6  F (37  C) (Tympanic)   Resp 18   Ht 1.53 m (5' 0.25\")   Wt 80.3 kg (177 lb)   Breastfeeding No   BMI 34.28 kg/m    Body mass index is 34.28 kg/m .  Physical Exam   GENERAL: alert and no distress  EYES: Eyes grossly normal to inspection, PERRL and conjunctivae and sclerae normal  NECK: no adenopathy, no asymmetry, masses, or scars and thyroid normal to palpation  RESP: lungs clear to auscultation - no rales, rhonchi or wheezes  CV: regular rates and rhythm, normal S1 S2, no S3 or S4 and no murmur, click or rub  ABDOMEN: soft, nontender  MS: no spinal/paraspinal tenderness, skin discoloration or swelling noted, normal lower extremity strength, no focal tenderness noted, SLR negative bilaterally, pedal pulses 3+, normal gait  SKIN: no suspicious lesions or rashes  NEURO: Normal strength and tone, mentation intact and speech " normal  PSYCH: mentation appears normal, affect normal/bright

## 2021-04-22 NOTE — NURSING NOTE
"Chief Complaint   Patient presents with     Numbness     /62 (Cuff Size: Adult Regular)   Pulse 70   Temp 98.6  F (37  C) (Tympanic)   Resp 18   Ht 1.53 m (5' 0.25\")   Wt 80.3 kg (177 lb)   Breastfeeding No   BMI 34.28 kg/m   Estimated body mass index is 34.28 kg/m  as calculated from the following:    Height as of this encounter: 1.53 m (5' 0.25\").    Weight as of this encounter: 80.3 kg (177 lb).  Patient presents to the clinic using No DME      Health Maintenance that is potentially due pending provider review:    Health Maintenance Due   Topic Date Due     ADVANCE CARE PLANNING  Never done     Pneumococcal Vaccine: Pediatrics (0 to 5 Years) and At-Risk Patients (6 to 64 Years) (1 of 2 - PPSV23) Never done     COVID-19 Vaccine (1) Never done     INFLUENZA VACCINE (1) 09/01/2020     PAP FOLLOW-UP  02/10/2022     HPV FOLLOW-UP  02/10/2022                "

## 2021-04-22 NOTE — PATIENT INSTRUCTIONS
Patient Education     Back Care Tips     Caring for your back  These are things you can do to prevent a recurrence of acute back pain and to reduce symptoms from chronic back pain:    Stay at a healthy weight. If you are overweight, losing weight will help most types of back pain.    Exercise is an important part of recovery from most types of back pain. The muscles behind and in front of the spine support the back. This means strengthening both the back muscles and the abdominal muscles will provide better support for your spine.     Swimming and brisk walking are good overall exercises to improve your fitness level.    Practice safe lifting methods (see below).    Practice good posture when sitting, standing, and walking. Don't sit for a long time. This puts more stress on the lower back than standing or walking.    Wear quality shoes with good arch support. Foot and ankle alignment can affect back symptoms. Don't wear high heels.    Therapeutic massage can help relax the back muscles without stretching them.    During the first 24 to 72 hours after an acute injury or flare-up of chronic back pain, put an ice pack on the painful area for 20 minutes and then remove it for 20 minutes. Do thisover a period of 60 to 90 minutes, or several times a day. As a safety precaution, don't use a heating pad at bedtime. Sleeping on a heating pad can lead to skin burns or tissue damage.    You can alternate using ice and heat.  Medicines  Talk with your healthcare provider before using medicines, especially if you have other health problems or are taking other medicines.    You may use over-the-counter medicines, such as acetaminophen, ibuprofen, or naprosyn to control pain, unless your healthcare provider prescribed other pain medicine. Talk with your healthcare provider before taking any medicines if you have a chronic condition such as diabetes, liver or kidney disease, stomach ulcers, or digestive bleeding, or are taking  blood thinners.    Be careful if you are given prescription pain medicines, opioids, or medicine for muscle spasm. They can cause drowsiness, and affect your coordination, reflexes, and judgment. Don't drive or operate heavy machinery while taking these types of medicines. Take prescription pain medicine only as prescribed by your healthcare provider.  Lumbar stretch  This simple stretch will help relax muscle spasm and keep your back more limber. If exercise makes your back pain worse, don t do it.    Lie on your back with your knees bent and both feet on the ground.    Slowly raise your left knee to your chest as you flatten your lower back against the floor. Hold for 5 seconds.    Relax and repeat the exercise with your right knee.    Do 10 of these exercises for each leg.  Safe lifting method    Don t bend over at the waist to lift an object off the floor.  Instead, bend your knees and hips in a squat.     Keep your back and head upright    Hold the object close to your body, directly in front of you.    Straighten your legs to lift the object.     Lower the object to the floor in the reverse fashion.    If you must slide something across the floor, push it.    Posture tips  Sitting  Sit in chairs with straight backs or low-back support. Keep your knees lower than your hips, with your feet flat on the floor.  When driving, sit up straight. Adjust the seat forward so you are not leaning toward the steering wheel.  A small pillow or rolled towel behind your lower back may help if you are driving long distances.   Standing  When standing for long periods, shift most of your weight to one leg at a time. Switch legs every few minutes.   Sleeping  The best way to sleep is on your side with your knees bent. Put a low pillow under your head to support your neck in a neutral spine position. Don't use thick pillows that bend your neck to one side. Put a pillow between your legs to further relax your lower back. If you  sleep on your back, put pillows under your knees to support your legs in a slightly flexed position. Use a firm mattress. If your mattress sags, replace it, or use a 1/2-inch plywood board under the mattress to add support.  Follow-up care  Follow up with your healthcare provider, or as advised.  If X-rays, a CT scan or an MRI scan were taken, they may be reviewed by a radiologist. You will be told of any new findings that may affect your care.  Call 911  Call 911 if any of the following occur:    Trouble breathing    Confusion    Very drowsy    Fainting or loss of consciousness    Rapid or very slow heart rate    Loss of  bowel or bladder control  When to seek medical advice  Call your healthcare provider right away if any of the following occur:    Pain becomes worse or spreads to your arms or legs    Weakness or numbness in one or both arms or legs    Numbness in the groin area  Shilpa last reviewed this educational content on 11/1/2019 2000-2021 [employer]. All rights reserved. This information is not intended as a substitute for professional medical care. Always follow your healthcare professional's instructions.

## 2021-05-03 ENCOUNTER — OFFICE VISIT (OUTPATIENT)
Dept: PALLIATIVE MEDICINE | Facility: CLINIC | Age: 31
End: 2021-05-03
Payer: COMMERCIAL

## 2021-05-03 VITALS — HEART RATE: 77 BPM | DIASTOLIC BLOOD PRESSURE: 72 MMHG | SYSTOLIC BLOOD PRESSURE: 109 MMHG

## 2021-05-03 DIAGNOSIS — M54.50 CHRONIC RIGHT-SIDED LOW BACK PAIN, UNSPECIFIED WHETHER SCIATICA PRESENT: ICD-10-CM

## 2021-05-03 DIAGNOSIS — G57.11 MERALGIA PARAESTHETICA, RIGHT: ICD-10-CM

## 2021-05-03 DIAGNOSIS — G89.29 CHRONIC RIGHT-SIDED LOW BACK PAIN, UNSPECIFIED WHETHER SCIATICA PRESENT: ICD-10-CM

## 2021-05-03 DIAGNOSIS — M43.16 SPONDYLOLISTHESIS OF LUMBAR REGION: Primary | ICD-10-CM

## 2021-05-03 PROCEDURE — 99203 OFFICE O/P NEW LOW 30 MIN: CPT | Performed by: STUDENT IN AN ORGANIZED HEALTH CARE EDUCATION/TRAINING PROGRAM

## 2021-05-03 ASSESSMENT — PAIN SCALES - GENERAL: PAINLEVEL: MODERATE PAIN (5)

## 2021-05-03 NOTE — PATIENT INSTRUCTIONS
I think the numbness in the side of your right thigh is meralgia paresthetica This is irritation of the lateral femoral cutaneous nerve. If it doesn't bother you, you don't need to do anything about it.     If it becomes bothersome, you can try over the counter topical medications.    If it becomes very bothersome we could do an injection.     Your low back has spondylolisthesis. If you are not having severe pain or any weakness or nerve pain going past your knee, it is OK to monitor it. I recommend you follow up with your PCP.     Follow up: as needed    Shantelle Morgan MD  [facility]   ----------------------------------------------------------------  Clinic Number:  [phone]     Call with any questions about your care and for scheduling assistance.     Calls are returned Monday through Friday between 8 AM and 4:30 PM. We usually get back to you within 2 business days depending on the issue/request.    If we are prescribing your medications:    For opioid medication refills, call the clinic or send a [employer] message 7 days in advance.  Please include:    Name of requested medication    Name of the pharmacy.    For non-opioid medications, call your pharmacy directly to request a refill. Please allow 3-4 days to be processed.     Per [place] State Law:    All controlled substance prescriptions must be filled within 30 days of being written.      For those controlled substances allowing refills, pickup must occur within 30 days of last fill.      We believe regular attendance is key to your success in our program!      Any time you are unable to keep your appointment we ask that you call us at least 24 hours in advance to cancel.This will allow us to offer the appointment time to another patient.     Multiple missed appointments may lead to dismissal from the clinic.
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
## 2021-05-04 ENCOUNTER — OFFICE VISIT (OUTPATIENT)
Dept: OBGYN | Facility: CLINIC | Age: 31
End: 2021-05-04
Payer: COMMERCIAL

## 2021-05-04 VITALS
HEART RATE: 79 BPM | RESPIRATION RATE: 14 BRPM | DIASTOLIC BLOOD PRESSURE: 57 MMHG | HEIGHT: 60 IN | TEMPERATURE: 99 F | BODY MASS INDEX: 35.34 KG/M2 | SYSTOLIC BLOOD PRESSURE: 103 MMHG | WEIGHT: 180 LBS

## 2021-05-04 DIAGNOSIS — Z90.79 STATUS POST BILATERAL SALPINGECTOMY: ICD-10-CM

## 2021-05-04 DIAGNOSIS — Z98.890 POSTOPERATIVE STATE: Primary | ICD-10-CM

## 2021-05-04 PROCEDURE — 99212 OFFICE O/P EST SF 10 MIN: CPT | Performed by: OBSTETRICS & GYNECOLOGY

## 2021-05-04 ASSESSMENT — MIFFLIN-ST. JEOR: SCORE: 1451.94

## 2021-05-04 NOTE — NURSING NOTE
"Initial /57 (BP Location: Right arm, Patient Position: Sitting, Cuff Size: Adult Regular)   Pulse 79   Temp 99  F (37.2  C) (Tympanic)   Resp 14   Ht 1.53 m (5' 0.25\")   Wt 81.6 kg (180 lb)   BMI 34.86 kg/m   Estimated body mass index is 34.86 kg/m  as calculated from the following:    Height as of this encounter: 1.53 m (5' 0.25\").    Weight as of this encounter: 81.6 kg (180 lb). .      "
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
## 2021-06-03 VITALS
HEIGHT: 62 IN | OXYGEN SATURATION: 98 % | DIASTOLIC BLOOD PRESSURE: 73 MMHG | RESPIRATION RATE: 16 BRPM | TEMPERATURE: 97.8 F | BODY MASS INDEX: 22.26 KG/M2 | HEART RATE: 100 BPM | WEIGHT: 121 LBS | SYSTOLIC BLOOD PRESSURE: 107 MMHG

## 2021-06-17 NOTE — PATIENT INSTRUCTIONS - HE
Patient Instructions by Smooth Blackwood PA-C at 10/19/2019  3:50 PM     Author: Smooth Blackwood PA-C Service: -- Author Type: Physician Assistant    Filed: 10/19/2019  4:17 PM Encounter Date: 10/19/2019 Status: Addendum    : Smooth Blackwood PA-C (Physician Assistant)    Related Notes: Original Note by Smooth Blackwood PA-C (Physician Assistant) filed at 10/19/2019  4:17 PM       Hot packs to the area 3 times per day.  Keep the wound clean dry and covered.  Wash once daily to remove any drainage.  Keep the wound covered to collect the drainage so it does not touch other parts of the skin.  Take the antibiotic until it is gone.  There is a 20% chance that this will recur once the antibiotic is completed.  If you are having increased pain increased fever inability to control fever nausea vomiting or fatigue or worsening of the size and drainage of the cyst present to the emergency room for evaluation and treatment.        Patient Education     Epidermoid Cyst (Sebaceous Cyst), Infected (Antibiotic Treatment)  You have an epidermoid cyst. This is a small, painless lump under your skin. An epidermoid cyst (often called a sebaceous cyst, epidermal cyst, or epidermal inclusion cyst) is a term most often used for 2 similar types of cysts:    Epidermoid cysts. These cysts form slowly under the skin. They can be found on most parts of the body. But they are most often found on areas with more hair such as the scalp, face, upper back, and genitals.    Pilar cysts. These are similar to epidermoid cysts. But they start from a different part of the hair follicle. They are more likely to be on the scalp.  Here are some general facts about these cysts:    A cyst is a sac filled with material that is often cheesy, fatty, oily, or stringy. The material inside can be thick. Or it can be a liquid.    You can usually move the cyst slightly if you try.    The cysts can be smaller than a pea or as large as a few inches.    The cysts are  usually not painful, unless they become inflamed or infected.    The area around the cyst may smell bad. If the cyst breaks open, the material inside it often smells bad as well.  Your cyst became infected and your healthcare provider wanted to treat it with antibiotics. If the antibiotics dont clear up the infection, the cyst will need to be drained by making a small cut (incision). Local anesthesia will be used to numb the area.  Home care    Resist the temptation to squeeze or pop the cyst, stick a needle in it, or cut it open. This often leads to a worsening infection and scarring.    Take the antibiotic as directed until it is all used up.    Soak the affected area in hot water or apply a hot pack (a thin, clean towel soaked in hot water) for 20 minutes at a time. Do this 3 to 4 times a day.    Apply antibiotic cream or ointment 3 times a day.    You may use over-the-counter pain medicine to control pain, unless another medicine was given. If you have chronic liver or kidney disease or ever had a stomach ulcer or GI bleeding, talk with your healthcare provider before using these medicines.  Prevention  Once this infection has healed, reduce the risk of future infections by:    Keeping the cyst area clean by bathing or showering daily    Avoiding tight-fitting clothing in the cyst area  Follow-up care  Follow up with your healthcare provider, or as advised. If a gauze packing was put in your wound, it should be removed in a few days as advised by your healthcare provider. Check your wound every day for the signs listed below.  When to seek medical advice  Call your healthcare provider right away if any of these occur:    Pus coming from the cyst    Increasing redness around the wound    Increasing local pain or swelling    Fever of 100.4 F (38 C) or higher, or as directed by your provider  Date Last Reviewed: 10/5/2016    1089-5123 [employer]. [address] [place] [address], [place] [address]. All rights  reserved. This information is not intended as a substitute for professional medical care. Always follow your healthcare professional's instructions.

## 2021-06-21 ENCOUNTER — OFFICE VISIT (OUTPATIENT)
Dept: URGENT CARE | Facility: URGENT CARE | Age: 31
End: 2021-06-21
Payer: COMMERCIAL

## 2021-06-21 VITALS
TEMPERATURE: 98 F | OXYGEN SATURATION: 99 % | HEIGHT: 60 IN | RESPIRATION RATE: 16 BRPM | DIASTOLIC BLOOD PRESSURE: 60 MMHG | SYSTOLIC BLOOD PRESSURE: 102 MMHG | BODY MASS INDEX: 34.04 KG/M2 | WEIGHT: 173.4 LBS | HEART RATE: 69 BPM

## 2021-06-21 DIAGNOSIS — L30.9 DERMATITIS: Primary | ICD-10-CM

## 2021-06-21 PROCEDURE — 99213 OFFICE O/P EST LOW 20 MIN: CPT | Performed by: PHYSICIAN ASSISTANT

## 2021-06-21 RX ORDER — TRIAMCINOLONE ACETONIDE 1 MG/G
CREAM TOPICAL
Qty: 45 G | Refills: 1 | Status: SHIPPED | OUTPATIENT
Start: 2021-06-21 | End: 2022-06-06

## 2021-06-21 ASSESSMENT — MIFFLIN-ST. JEOR: SCORE: 1422.01

## 2021-06-21 NOTE — PROGRESS NOTES
"    Assessment & Plan     Dermatitis  Will treat with triamcinolone (KENALOG) 0.1 % external cream; Apply sparingly to affected area three times daily for 7-14 days. Avoid scratching. Watch for signs of infection. Return to clinic if symptoms worsen or do not improve; otherwise follow up as needed                     Return in about 1 week (around 6/28/2021), or if symptoms worsen or fail to improve.    DANISH Sor[facility] [place]    Subjective   Chief Complaint   Patient presents with     Derm Problem     2 weeks Patient had little bumps started on the right butt cheek and now they are spreading, itch, red and raised.       HPI     Derm problem     Onset of symptoms was 2 week(s) ago.  Course of illness is worsening.    Severity moderate  Current and Associated symptoms: itchy rash right buttock area   Treatment measures tried include hydrocortisone, helped slightly.  Predisposing factors include none.                Review of Systems   Constitutional, HEENT, cardiovascular, pulmonary, gi and gu systems are negative, except as otherwise noted.      Objective    /60 (BP Location: Right arm, Patient Position: Sitting, Cuff Size: Adult Regular)   Pulse 69   Temp 98  F (36.7  C) (Tympanic)   Resp 16   Ht 1.53 m (5' 0.25\")   Wt 78.7 kg (173 lb 6.4 oz)   SpO2 99%   BMI 33.58 kg/m    Body mass index is 33.58 kg/m .  Physical Exam  Constitutional:       General: She is not in acute distress.  Skin:            Comments: Dermatitis type rash right buttock area, no drainage/discharge or signs of infection.    Neurological:      Mental Status: She is alert.   Psychiatric:         Mood and Affect: Mood normal.         Speech: Speech normal.         Behavior: Behavior normal.                        "

## 2021-09-18 ENCOUNTER — HEALTH MAINTENANCE LETTER (OUTPATIENT)
Age: 31
End: 2021-09-18

## 2021-09-28 ENCOUNTER — OFFICE VISIT (OUTPATIENT)
Dept: URGENT CARE | Facility: URGENT CARE | Age: 31
End: 2021-09-28
Payer: COMMERCIAL

## 2021-09-28 VITALS
DIASTOLIC BLOOD PRESSURE: 68 MMHG | TEMPERATURE: 99.1 F | OXYGEN SATURATION: 98 % | RESPIRATION RATE: 16 BRPM | HEART RATE: 89 BPM | SYSTOLIC BLOOD PRESSURE: 120 MMHG

## 2021-09-28 DIAGNOSIS — J06.9 VIRAL URI WITH COUGH: Primary | ICD-10-CM

## 2021-09-28 PROCEDURE — 99213 OFFICE O/P EST LOW 20 MIN: CPT | Performed by: PHYSICIAN ASSISTANT

## 2021-09-28 PROCEDURE — 99000 SPECIMEN HANDLING OFFICE-LAB: CPT | Performed by: PHYSICIAN ASSISTANT

## 2021-09-28 PROCEDURE — U0003 INFECTIOUS AGENT DETECTION BY NUCLEIC ACID (DNA OR RNA); SEVERE ACUTE RESPIRATORY SYNDROME CORONAVIRUS 2 (SARS-COV-2) (CORONAVIRUS DISEASE [COVID-19]), AMPLIFIED PROBE TECHNIQUE, MAKING USE OF HIGH THROUGHPUT TECHNOLOGIES AS DESCRIBED BY CMS-2020-01-R: HCPCS | Mod: 90 | Performed by: PHYSICIAN ASSISTANT

## 2021-09-29 NOTE — PROGRESS NOTES
Assessment & Plan     1. Viral URI with cough  COVID pending. Continue with supportive care. Get plenty of rest and push fluids. Can use Tylenol and/or ibuprofen as needed for pain and/or fever control. Discussed quarantine guidelines. Return to clinic if symptoms worsen or do not improve; otherwise follow up as needed       - Symptomatic COVID-19 Virus (Coronavirus) by PCR Nasopharyngeal                    Return in about 1 week (around 10/5/2021), or if symptoms worsen or fail to improve.    DANISH Soriano [facility]            Subjective   Chief Complaint   Patient presents with     Cough     yesterday, sore throat, headache         HPI     URI Adult    Onset of symptoms was 1 day(s) ago.  Course of illness is same.    Severity moderate  Current and Associated symptoms: sore throat, cough, headache  Treatment measures tried include none.  Predisposing factors include None.                Review of Systems   Constitutional, HEENT, cardiovascular, pulmonary, gi and gu systems are negative, except as otherwise noted.      Objective    /68 (BP Location: Right arm, Patient Position: Sitting, Cuff Size: Adult Regular)   Pulse 89   Temp 99.1  F (37.3  C) (Tympanic)   Resp 16   SpO2 98%   There is no height or weight on file to calculate BMI.     Physical Exam  Constitutional:       Appearance: She is well-developed.   HENT:      Head: Normocephalic.      Right Ear: Tympanic membrane and ear canal normal.      Left Ear: Tympanic membrane and ear canal normal.      Mouth/Throat:      Pharynx: Posterior oropharyngeal erythema present.   Eyes:      Conjunctiva/sclera: Conjunctivae normal.   Cardiovascular:      Rate and Rhythm: Normal rate.      Heart sounds: Normal heart sounds.   Pulmonary:      Effort: Pulmonary effort is normal.      Breath sounds: Normal breath sounds.   Skin:     General: Skin is warm and dry.      Findings: No rash.   Psychiatric:          Behavior: Behavior normal.

## 2021-09-30 ENCOUNTER — TELEPHONE (OUTPATIENT)
Dept: FAMILY MEDICINE | Facility: CLINIC | Age: 31
End: 2021-09-30

## 2021-09-30 NOTE — TELEPHONE ENCOUNTER
Patient called looking for covid test results.    Pending    She will watch her My Chart for results

## 2021-10-02 LAB — SARS-COV-2 RNA RESP QL NAA+PROBE: NOT DETECTED

## 2021-11-02 ENCOUNTER — OFFICE VISIT (OUTPATIENT)
Dept: URGENT CARE | Facility: URGENT CARE | Age: 31
End: 2021-11-02
Payer: COMMERCIAL

## 2021-11-02 VITALS
WEIGHT: 169 LBS | TEMPERATURE: 97.3 F | BODY MASS INDEX: 32.73 KG/M2 | HEART RATE: 74 BPM | SYSTOLIC BLOOD PRESSURE: 112 MMHG | DIASTOLIC BLOOD PRESSURE: 70 MMHG | OXYGEN SATURATION: 97 % | RESPIRATION RATE: 18 BRPM

## 2021-11-02 DIAGNOSIS — Z11.3 SCREEN FOR STD (SEXUALLY TRANSMITTED DISEASE): Primary | ICD-10-CM

## 2021-11-02 PROCEDURE — 99213 OFFICE O/P EST LOW 20 MIN: CPT | Performed by: PHYSICIAN ASSISTANT

## 2021-11-02 PROCEDURE — 87591 N.GONORRHOEAE DNA AMP PROB: CPT | Performed by: PHYSICIAN ASSISTANT

## 2021-11-02 PROCEDURE — 87491 CHLMYD TRACH DNA AMP PROBE: CPT | Performed by: PHYSICIAN ASSISTANT

## 2021-11-02 NOTE — PROGRESS NOTES
"  Assessment & Plan     Screen for STD (sexually transmitted disease)  Will contact her with results. Follow up as needed.     - Chlamydia trachomatis PCR  - Neisseria gonorrhoeae PCR             BMI:   Estimated body mass index is 32.73 kg/m  as calculated from the following:    Height as of 6/21/21: 1.53 m (5' 0.25\").    Weight as of this encounter: 76.7 kg (169 lb).           Return in about 1 week (around 11/9/2021), or if symptoms worsen or fail to improve.    DANISH Soriano [facility] [place]          Subjective   Chief Complaint   Patient presents with     Vaginal Problem     Patient believes she has a tampon stuck inside her. Thinks its been in there since 10/31/2021         HPI     Possible retained tampon/screening STD    Onset of symptoms was 2 day(s) ago.  Course of illness is same.    Severity mild  Current and Associated symptoms: possibly retained tampon, cannot remember taking out  Treatment measures tried include none.  Predisposing factors include new sexual partner, would like to screen for chlamydia and gonorrhea .                Review of Systems   Constitutional, HEENT, cardiovascular, pulmonary, gi and gu systems are negative, except as otherwise noted.      Objective    /70 (BP Location: Right arm, Patient Position: Sitting, Cuff Size: Adult Regular)   Pulse 74   Temp 97.3  F (36.3  C) (Tympanic)   Resp 18   Wt 76.7 kg (169 lb)   SpO2 97%   BMI 32.73 kg/m    Body mass index is 32.73 kg/m .     Physical Exam  Constitutional:       General: She is not in acute distress.  Genitourinary:     Comments: Speculum exam revealed no retained tampon. Gc/chlamydia obtained   Neurological:      Mental Status: She is alert.   Psychiatric:         Behavior: Behavior normal.                        "

## 2021-11-03 LAB
C TRACH DNA SPEC QL NAA+PROBE: NEGATIVE
N GONORRHOEA DNA SPEC QL NAA+PROBE: NEGATIVE

## 2021-11-03 NOTE — RESULT ENCOUNTER NOTE
Final result for both N. Gonorrhoeae PCR and Chlamydia Trachomatis PCR are NEGATIVE.  No treatment or change in treatment per [facility] ED Lab Result N. Gonorrhea AND/OR Chlamydia T. protocol.

## 2021-12-28 ENCOUNTER — ANCILLARY PROCEDURE (OUTPATIENT)
Dept: GENERAL RADIOLOGY | Facility: CLINIC | Age: 31
End: 2021-12-28
Attending: PHYSICIAN ASSISTANT
Payer: COMMERCIAL

## 2021-12-28 ENCOUNTER — OFFICE VISIT (OUTPATIENT)
Dept: URGENT CARE | Facility: URGENT CARE | Age: 31
End: 2021-12-28
Payer: COMMERCIAL

## 2021-12-28 VITALS
DIASTOLIC BLOOD PRESSURE: 62 MMHG | SYSTOLIC BLOOD PRESSURE: 110 MMHG | TEMPERATURE: 99.1 F | HEART RATE: 76 BPM | OXYGEN SATURATION: 98 % | RESPIRATION RATE: 16 BRPM

## 2021-12-28 DIAGNOSIS — M25.511 ACUTE PAIN OF RIGHT SHOULDER: Primary | ICD-10-CM

## 2021-12-28 DIAGNOSIS — J02.9 SORE THROAT: ICD-10-CM

## 2021-12-28 DIAGNOSIS — M25.511 ACUTE PAIN OF RIGHT SHOULDER: ICD-10-CM

## 2021-12-28 LAB — DEPRECATED S PYO AG THROAT QL EIA: NEGATIVE

## 2021-12-28 PROCEDURE — 87651 STREP A DNA AMP PROBE: CPT | Performed by: PHYSICIAN ASSISTANT

## 2021-12-28 PROCEDURE — U0005 INFEC AGEN DETEC AMPLI PROBE: HCPCS | Performed by: PHYSICIAN ASSISTANT

## 2021-12-28 PROCEDURE — 99214 OFFICE O/P EST MOD 30 MIN: CPT | Performed by: PHYSICIAN ASSISTANT

## 2021-12-28 PROCEDURE — 73030 X-RAY EXAM OF SHOULDER: CPT | Mod: RT | Performed by: RADIOLOGY

## 2021-12-28 PROCEDURE — U0003 INFECTIOUS AGENT DETECTION BY NUCLEIC ACID (DNA OR RNA); SEVERE ACUTE RESPIRATORY SYNDROME CORONAVIRUS 2 (SARS-COV-2) (CORONAVIRUS DISEASE [COVID-19]), AMPLIFIED PROBE TECHNIQUE, MAKING USE OF HIGH THROUGHPUT TECHNOLOGIES AS DESCRIBED BY CMS-2020-01-R: HCPCS | Performed by: PHYSICIAN ASSISTANT

## 2021-12-28 RX ORDER — NAPROXEN 500 MG/1
500 TABLET ORAL 2 TIMES DAILY WITH MEALS
Qty: 60 TABLET | Refills: 0 | Status: SHIPPED | OUTPATIENT
Start: 2021-12-28 | End: 2022-06-06

## 2021-12-28 NOTE — LETTER
[place] [id number] [address]  Phone: [phone]  Fax: [phone]    December 28, 2021        Mora Reardon  [address] [address]          To whom it may concern:    RE: Mora Reardon    Patient was seen and treated today at our clinic and missed work 12/28/21 and 12/29/21    Please contact me for questions or concerns.      Sincerely,        Cyn Hays PA-C

## 2021-12-28 NOTE — PROGRESS NOTES
"  Assessment & Plan     Sore throat  Rapid strep negative.COVID pending. Continue with supportive care. Get plenty of rest and push fluids. Can use Tylenol and/or ibuprofen as needed for pain and/or fever control. Discussed quarantine guidelines. Return to clinic if symptoms worsen or do not improve; otherwise follow up as needed     - Streptococcus A Rapid Screen w/Reflex to PCR - Clinic Collect  - Symptomatic; Yes; 12/27/2021 COVID-19 Virus (Coronavirus) by PCR Nose  - Group A Streptococcus PCR Throat Swab    Acute pain of right shoulder  Can take naproxen two times daily x 5-10 days for pain and inflammation. Avoid aggravating factors but encouraged gentle stretching/ROM. Return to clinic if symptoms worsen or do not improve; otherwise follow up as needed      - XR Shoulder Right G/E 3 Views; Future  - naproxen (NAPROSYN) 500 MG tablet; Take 1 tablet (500 mg) by mouth 2 times daily (with meals)             BMI:   Estimated body mass index is 32.73 kg/m  as calculated from the following:    Height as of 6/21/21: 1.53 m (5' 0.25\").    Weight as of 11/2/21: 76.7 kg (169 lb).           Return in about 1 week (around 1/4/2022), or if symptoms worsen or fail to improve.    DANISH Soriano [facility] [place]            Subjective   Chief Complaint   Patient presents with     Pharyngitis     Last night cough, hot and cold, sore throat, midol 11am. 12/27/21 fell on the ice injured right upper arm, unable to lift and turn arm with out pain.     Fall         HPI     MS Injury/Pain    Onset of symptoms was 1 day(s) ago.  Location: right shoulder  Context:       The injury happened while at home      Mechanism: fall       Patient experienced immediate pain  Course of symptoms is same.    Severity moderate  Current and Associated symptoms: Pain  Aggravating Factors: movement and reaching above head  Therapies to improve symptoms include: none  This is the first time this type of problem has " occurred for this patient.               Review of Systems   Constitutional, HEENT, cardiovascular, pulmonary, gi and gu systems are negative, except as otherwise noted.      Objective    /62 (BP Location: Left arm, Patient Position: Sitting, Cuff Size: Adult Regular)   Pulse 76   Temp 99.1  F (37.3  C) (Tympanic)   Resp 16   SpO2 98%   There is no height or weight on file to calculate BMI.  Physical Exam  Constitutional:       General: She is not in acute distress.     Appearance: She is well-developed.   HENT:      Head: Normocephalic.      Right Ear: Tympanic membrane and ear canal normal.      Left Ear: Tympanic membrane and ear canal normal.      Mouth/Throat:      Pharynx: Posterior oropharyngeal erythema present.   Eyes:      Conjunctiva/sclera: Conjunctivae normal.   Cardiovascular:      Rate and Rhythm: Normal rate.      Heart sounds: Normal heart sounds.   Pulmonary:      Effort: Pulmonary effort is normal.      Breath sounds: Normal breath sounds.   Musculoskeletal:      Comments: Right shoulder with no obvious deformity, erythema, edema, or ecchymosis. Diffuse tenderness with palpation. Painful active ROM with abduction and reaching above head. Non-tender internal /external rotation of the glenohumeral joint. CMS intact.     Skin:     General: Skin is warm and dry.      Findings: No rash.   Psychiatric:         Speech: Speech normal.         Behavior: Behavior normal.

## 2021-12-29 LAB — GROUP A STREP BY PCR: NOT DETECTED

## 2021-12-30 LAB — SARS-COV-2 RNA RESP QL NAA+PROBE: NEGATIVE

## 2022-01-04 ENCOUNTER — VIRTUAL VISIT (OUTPATIENT)
Dept: FAMILY MEDICINE | Facility: CLINIC | Age: 32
End: 2022-01-04
Payer: COMMERCIAL

## 2022-01-04 ENCOUNTER — ALLIED HEALTH/NURSE VISIT (OUTPATIENT)
Dept: FAMILY MEDICINE | Facility: CLINIC | Age: 32
End: 2022-01-04
Payer: COMMERCIAL

## 2022-01-04 DIAGNOSIS — R50.9 FEVER AND CHILLS: ICD-10-CM

## 2022-01-04 DIAGNOSIS — R50.9 FEVER AND CHILLS: Primary | ICD-10-CM

## 2022-01-04 DIAGNOSIS — R05.9 COUGH: ICD-10-CM

## 2022-01-04 LAB
FLUAV AG SPEC QL IA: NEGATIVE
FLUBV AG SPEC QL IA: NEGATIVE

## 2022-01-04 PROCEDURE — U0003 INFECTIOUS AGENT DETECTION BY NUCLEIC ACID (DNA OR RNA); SEVERE ACUTE RESPIRATORY SYNDROME CORONAVIRUS 2 (SARS-COV-2) (CORONAVIRUS DISEASE [COVID-19]), AMPLIFIED PROBE TECHNIQUE, MAKING USE OF HIGH THROUGHPUT TECHNOLOGIES AS DESCRIBED BY CMS-2020-01-R: HCPCS | Performed by: PHYSICIAN ASSISTANT

## 2022-01-04 PROCEDURE — 99207 PR NO CHARGE LOS: CPT

## 2022-01-04 PROCEDURE — 87804 INFLUENZA ASSAY W/OPTIC: CPT

## 2022-01-04 PROCEDURE — 99213 OFFICE O/P EST LOW 20 MIN: CPT | Mod: 95 | Performed by: PHYSICIAN ASSISTANT

## 2022-01-04 RX ORDER — ACETAMINOPHEN 325 MG/1
325-650 TABLET ORAL EVERY 6 HOURS PRN
COMMUNITY

## 2022-01-04 NOTE — PROGRESS NOTES
"Mora is a 31 year old who is being evaluated via a billable telephone visit.      What phone number would you like to be contacted at? [phone]  How would you like to obtain your AVS? [facility]    Assessment & Plan     1. Fever and chills    2. Cough        Will get her tested for Covid and influenza. Supportive therapy also discussed. Follow up if symptoms fail to improve or worsen. Discussed possible quarantine.    Patient Instructions   Increase your water intake in order to keep the secretions/mucous in your upper respiratory tract thin. Get plenty of rest and wash your hands well.     Call the clinic if your symptoms have not shown improvement by the 2 week rosaura.    For sinus congestion: Sudafed and DayQuil  For cough: Delsym and NyQuil (before bed)  For pain: ibuprofen and/or Tylenol every 4-6 hours (can alternate)  Chest congestion: Mucinex or Robitussin    D = decongestant (Sudafed)  DM = cough suppressant (Delsym)        Ordering of each unique test         BMI:   Estimated body mass index is 32.73 kg/m  as calculated from the following:    Height as of 6/21/21: 1.53 m (5' 0.25\").    Weight as of 11/2/21: 76.7 kg (169 lb).       Return in about 10 days (around 1/14/2022), or if symptoms worsen or fail to improve.    DANISH Mcclellan [facility] OSEAS Velazco is a 31 year old who presents for the following health issues     HPI     Concern for COVID-19  About how many days ago did these symptoms start? Started last night  Is this your first visit for this illness? Yes  In the 14 days before your symptoms started, have you had close contact with someone with COVID-19 (Coronavirus)? Yes, I have been in contact with someone who has symptoms of COVID-19/Coronavirus (no lab test done or waiting on results).  Do you have a fever or chills? Yes, I felt feverish or had chills  Are you having new or worsening difficulty breathing? No  Do you have new or worsening cough? " Yes, I am coughing up mucus.  Have you had any new or unexplained body aches? No    Have you experienced any of the following NEW symptoms?    Headache: YES    Sore throat: No    Loss of taste or smell: No    Chest pain: No    Diarrhea: YES    Rash: No  What treatments have you tried? tylenol  Who do you live with? son  Are you, or a household member, a healthcare worker or a ? No  Do you live in a nursing home, group home, or shelter? No  Do you have a way to get food/medications if quarantined? Yes, I have a friend or family member who can help me.            Review of Systems   Constitutional, HEENT, cardiovascular, pulmonary, gi and gu systems are negative, except as otherwise noted.      Objective           Vitals:  No vitals were obtained today due to virtual visit.    Physical Exam   healthy, alert and no distress  PSYCH: Alert and oriented times 3; coherent speech, normal   rate and volume, able to articulate logical thoughts, able   to abstract reason, no tangential thoughts, no hallucinations   or delusions  Her affect is normal and pleasant  RESP: No cough, no audible wheezing, able to talk in full sentences  Remainder of exam unable to be completed due to telephone visits          Phone call duration: 5 minutes

## 2022-01-04 NOTE — PATIENT INSTRUCTIONS
Increase your water intake in order to keep the secretions/mucous in your upper respiratory tract thin. Get plenty of rest and wash your hands well.     Call the clinic if your symptoms have not shown improvement by the 2 week rosaura.    For sinus congestion: Sudafed and DayQuil  For cough: Delsym and NyQuil (before bed)  For pain: ibuprofen and/or Tylenol every 4-6 hours (can alternate)  Chest congestion: Mucinex or Robitussin    D = decongestant (Sudafed)  DM = cough suppressant (Delsym)

## 2022-01-05 LAB — SARS-COV-2 RNA RESP QL NAA+PROBE: NORMAL

## 2022-01-06 LAB — SARS-COV-2 RNA RESP QL NAA+PROBE: NOT DETECTED

## 2022-01-25 ENCOUNTER — PATIENT OUTREACH (OUTPATIENT)
Dept: FAMILY MEDICINE | Facility: CLINIC | Age: 32
End: 2022-01-25
Payer: COMMERCIAL

## 2022-01-25 DIAGNOSIS — R87.810 CERVICAL HIGH RISK HPV (HUMAN PAPILLOMAVIRUS) TEST POSITIVE: ICD-10-CM

## 2022-01-25 NOTE — LETTER
January 25, 2022      Mora Reardon  [address] [address]        Dear ,    This letter is to remind you that you are due for your follow-up Pap smear and Human Papillomavirus (HPV) test.    Please call [phone] to schedule your appointment at your earliest convenience.    If you have completed the appointment outside of the [facility] system, please have the records forwarded to our office. We will update your chart for your provider to review before your next annual wellness visit.     Thank you for choosing [facility]!      Sincerely,    Your [facility] Care Team

## 2022-03-21 NOTE — TELEPHONE ENCOUNTER
FYI to provider - Patient is lost to pap tracking follow-up. Attempts to contact pt have been made per reminder process and there has been no reply and/or no appt scheduled.       4/29/19 NIL Pap, + HR HPV (Neg 16/18). Plan cotest in 1 year.   2/10/21 NIL pap, Neg HPV. Plan cotest in 1 year due bef 2/10/22.  2/17/21 Result letter mailed to pt  1/25/22 Reminder letter  2/21/22 Reminder call -- Pt notified   3/21/22 Lost to follow-up for pap tracking     Osiris Jacobs RN BSN, Pap Tracking

## 2022-04-30 ENCOUNTER — HEALTH MAINTENANCE LETTER (OUTPATIENT)
Age: 32
End: 2022-04-30

## 2022-06-03 ENCOUNTER — NURSE TRIAGE (OUTPATIENT)
Dept: FAMILY MEDICINE | Facility: CLINIC | Age: 32
End: 2022-06-03
Payer: COMMERCIAL

## 2022-06-03 ENCOUNTER — OFFICE VISIT (OUTPATIENT)
Dept: URGENT CARE | Facility: URGENT CARE | Age: 32
End: 2022-06-03
Payer: COMMERCIAL

## 2022-06-03 VITALS
BODY MASS INDEX: 33.12 KG/M2 | HEART RATE: 68 BPM | TEMPERATURE: 99.6 F | DIASTOLIC BLOOD PRESSURE: 68 MMHG | OXYGEN SATURATION: 95 % | SYSTOLIC BLOOD PRESSURE: 105 MMHG | WEIGHT: 171 LBS

## 2022-06-03 DIAGNOSIS — I88.9 LYMPHADENITIS: Primary | ICD-10-CM

## 2022-06-03 PROCEDURE — 99213 OFFICE O/P EST LOW 20 MIN: CPT | Performed by: PHYSICIAN ASSISTANT

## 2022-06-03 NOTE — TELEPHONE ENCOUNTER
KATHYA advising pt to be seen in [facility] today.  Gave [place] and [facility] hrss.  YVETTE Celestin RN

## 2022-06-03 NOTE — TELEPHONE ENCOUNTER
"2nd level triage.   Per headache protocol - call back within one hour    Advised to go to [facility] opens at 10am attached to the clinic. Per  PCP office does not have an opening today.     Routed-high priority to  pcp and [facility] triage group    Situation:  transferred from central scheduling for a severe headache and lumps. pcp office does not have any openings today.     Background:    Smoker   Has not tried allergy meds, no know allergies  Test for Covid 3 weeks ago was neg, has not taken once since  Headache once a month typically relived with Tylenol   5 can a day mountain dew drinker.     Assessment: see headache assessment below.   Patient noticed 2 bumps around her scalp at the same time as the  headache started yesterday while showering around am. The bumps have mild pain when touched/when she was washing her hair.   .   Both lumps are in the back of her head/scalp even with each other just noticed yesterday  Both feel hard   Quarter size   Has not noticed drainage, skin is all intact  Denied warmth around area   No itching.   No fever, no chills.   Patient had  look at lumps-says normal appearing skin unchanged size since last night   Patient does not know what they are from    Headache assessments protocol below.   Has not taken any Tylenol or ibuprofen for 6 hours as of now.   Headache now a 6/10 last night was a 9/10   Sleep was disrupted due to headache pain   no blurry vision   No double vision  No balance issues   No facial pressure   Speech is clear   Drinks \" a lot of mountain dew, 5 cans a day\"   Last had caffeine 14 hours ago, \"trying to increase water\". Had 3 cans of mountain dew  yesterday.     Cough   Dry Cough for the past 2 weeks, coughed up brown sputum 3 days ago.-no SOB, no chest pain , no fever      Clear nasal drainage     Recommendation:  Stretching of neck and back.   Ice on neck 20 minutes, off for 1 hour  Scalp massage   Alternate tylenol and ibuprofen " as directed, keep track of when you take medications.   Drink  some caffeine and eat breakfast with the ibuprofen.   Drink Water and eat well   Monitor scalp/lumps-advised of signs and symptoms  of infection.   Monitor cough-worsening or persistent symptoms  make a appointment.   See care advice   Advised to go to [facility] opens at 10am attached to the clinic. Per  PCP office does not have an opening today.    red flag symptoms reviewed with patient-ED    Patient denied any other questions or concerns and agrees with plan.       Reason for Disposition    SEVERE headache (e.g., excruciating) and has had severe headaches before    Additional Information    Negative: Difficult to awaken or acting confused (e.g., disoriented, slurred speech)    Negative: Weakness of the face, arm or leg on one side of the body and new onset    Negative: Numbness of the face, arm or leg on one side of the body and new onset    Negative: Loss of speech or garbled speech and new onset    Negative: Passed out (i.e., fainted, collapsed and was not responding)    Negative: Sounds like a life-threatening emergency to the triager    Negative: Followed a head injury within last 3 days    Negative: Traumatic Brain Injury (TBI) is suspected    Negative: Sinus pain of forehead and yellow or green nasal discharge    Negative: Pregnant    Negative: Unable to walk without falling    Negative: Stiff neck (can't touch chin to chest)    Negative: Possibility of carbon monoxide exposure    Negative: SEVERE headache, states 'worst headache' of life    Negative: SEVERE headache, sudden-onset (i.e., reaching maximum intensity within seconds to 1 hour)    Negative: Severe pain in one eye    Negative: Loss of vision or double vision (Exception: same as prior migraines)    Negative: Patient sounds very sick or weak to the triager    Negative: Fever > 103 F (39.4 C)    Negative: Fever > 100.0 F (37.8 C) and has diabetes mellitus or a weak  "immune system (e.g., HIV positive, cancer chemotherapy, organ transplant, splenectomy, chronic steroids)    Answer Assessment - Initial Assessment Questions  1. LOCATION: \"Where does it hurt?\"       Sides of head and in back.   2. ONSET: \"When did the headache start?\" (Minutes, hours or days)       Yesterday 8am suddenly came on .   3. PATTERN: \"Does the pain come and go, or has it been constant since it started?\"       Constant steady   4. SEVERITY: \"How bad is the pain?\" and \"What does it keep you from doing?\"  (e.g., Scale 1-10; mild, moderate, or severe)    - MILD (1-3): doesn't interfere with normal activities     - MODERATE (4-7): interferes with normal activities or awakens from sleep     - SEVERE (8-10): excruciating pain, unable to do any normal activities         Sides of head and back of head, last night 9/10 today 6/10     5. RECURRENT SYMPTOM: \"Have you ever had headaches before?\" If so, ask: \"When was the last time?\" and \"What happened that time?\"       Worse than she has had before. Baseline headache once a month. No migraine medication typically  takes tylenol   6. CAUSE: \"What do you think is causing the headache?\"      ?   7. MIGRAINE: \"Have you been diagnosed with migraine headaches?\" If so, ask: \"Is this headache similar?\"       No   8. HEAD INJURY: \"Has there been any recent injury to the head?\"       No   9. OTHER SYMPTOMS: \"Do you have any other symptoms?\" (fever, stiff neck, eye pain, sore throat, cold symptoms)      No fever  Neck and shoulder muscles aches  No sore throat   Coughing up brown sputum 3 days ago now dry cough. Had the cough for 2 weeks.   Clear nasal drainage  Not around anyone known to have the Flu.  Tested for Covid 3 weeks ago and negative.     10. PREGNANCY: \"Is there any chance you are pregnant?\" \"When was your last menstrual period?\"        No period in the last 6 months. Tubes were \"burnt \"    Protocols used: HEADACHE-A-[place]    Corine FELIX RN   [facility] " Clinic Triage

## 2022-06-03 NOTE — PROGRESS NOTES
"Assessment & Plan      1. Lymphadenitis  Occipital with undeclared focus.     - amoxicillin-clavulanate (AUGMENTIN) 875-125 MG tablet; Take 1 tablet by mouth 2 times daily for 7 days  Dispense: 14 tablet; Refill: 0    Follow up if not improving over the next week                 DANISH Macias [facility] CARE [place]    Nadeen Velazco is a 31 year old female who presents to clinic today for the following health issues:  Chief Complaint   Patient presents with     Headache     Developed 2 lumps on the back of her head overnight.  Tender to touch, has \"severe\" headache.  Has taken tylenol and ibuprofen in the last 24 hours, still headache.  Does have a kidney disease, has a little nausea and pain in her left side.  Mostly concerned about head pain.     HPI    Here today for headache started yesterday as well as lumps on the back of head and neck. No fever. Fatigue. No sore throat or ear pain. History of Bartter's syndrome taking potassium daily. No chest pain or abdominal pain. Eating normally. No others with symptoms like this in the house.             Review of Systems        Objective    /68   Pulse 68   Temp 99.6  F (37.6  C) (Tympanic)   Wt 77.6 kg (171 lb)   SpO2 95%   BMI 33.12 kg/m    Physical Exam  Vitals and nursing note reviewed.   Constitutional:       General: She is not in acute distress.     Appearance: She is well-developed. She is not diaphoretic.   HENT:      Head: Normocephalic and atraumatic.      Right Ear: Tympanic membrane and external ear normal.      Left Ear: Tympanic membrane and external ear normal.   Eyes:      Pupils: Pupils are equal, round, and reactive to light.   Pulmonary:      Effort: Pulmonary effort is normal. No respiratory distress.      Breath sounds: Normal breath sounds.   Musculoskeletal:      Cervical back: Normal range of motion and neck supple.   Lymphadenopathy:      Head:      Right side of head: Occipital adenopathy present. "      Left side of head: Occipital adenopathy present.      Cervical: No cervical adenopathy.   Skin:     General: Skin is warm and dry.   Neurological:      Mental Status: She is alert.      Cranial Nerves: No cranial nerve deficit.

## 2022-06-06 ENCOUNTER — OFFICE VISIT (OUTPATIENT)
Dept: FAMILY MEDICINE | Facility: CLINIC | Age: 32
End: 2022-06-06
Payer: COMMERCIAL

## 2022-06-06 VITALS
HEIGHT: 62 IN | RESPIRATION RATE: 16 BRPM | TEMPERATURE: 98.5 F | HEART RATE: 76 BPM | DIASTOLIC BLOOD PRESSURE: 62 MMHG | BODY MASS INDEX: 31.28 KG/M2 | SYSTOLIC BLOOD PRESSURE: 110 MMHG | WEIGHT: 170 LBS

## 2022-06-06 DIAGNOSIS — Z01.419 ENCOUNTER FOR GYNECOLOGICAL EXAMINATION WITHOUT ABNORMAL FINDING: ICD-10-CM

## 2022-06-06 DIAGNOSIS — B37.31 CANDIDIASIS OF VAGINA: ICD-10-CM

## 2022-06-06 DIAGNOSIS — F31.70 BIPOLAR DISORDER IN FULL REMISSION, MOST RECENT EPISODE UNSPECIFIED TYPE (H): ICD-10-CM

## 2022-06-06 DIAGNOSIS — Z00.00 ROUTINE GENERAL MEDICAL EXAMINATION AT A HEALTH CARE FACILITY: Primary | ICD-10-CM

## 2022-06-06 DIAGNOSIS — N89.8 VAGINAL DISCHARGE: ICD-10-CM

## 2022-06-06 DIAGNOSIS — E26.81 BARTTER'S SYNDROME (H): ICD-10-CM

## 2022-06-06 LAB
CLUE CELLS: ABNORMAL
TRICHOMONAS, WET PREP: ABNORMAL
WBC'S/HIGH POWER FIELD, WET PREP: ABNORMAL
YEAST, WET PREP: PRESENT

## 2022-06-06 PROCEDURE — 87624 HPV HI-RISK TYP POOLED RSLT: CPT | Performed by: NURSE PRACTITIONER

## 2022-06-06 PROCEDURE — G0145 SCR C/V CYTO,THINLAYER,RESCR: HCPCS | Performed by: NURSE PRACTITIONER

## 2022-06-06 PROCEDURE — 99214 OFFICE O/P EST MOD 30 MIN: CPT | Mod: 25 | Performed by: NURSE PRACTITIONER

## 2022-06-06 PROCEDURE — 87210 SMEAR WET MOUNT SALINE/INK: CPT | Performed by: NURSE PRACTITIONER

## 2022-06-06 PROCEDURE — 87591 N.GONORRHOEAE DNA AMP PROB: CPT | Performed by: NURSE PRACTITIONER

## 2022-06-06 PROCEDURE — 87491 CHLMYD TRACH DNA AMP PROBE: CPT | Performed by: NURSE PRACTITIONER

## 2022-06-06 PROCEDURE — 99395 PREV VISIT EST AGE 18-39: CPT | Performed by: NURSE PRACTITIONER

## 2022-06-06 ASSESSMENT — ENCOUNTER SYMPTOMS
COUGH: 0
NERVOUS/ANXIOUS: 1
DIARRHEA: 1
SORE THROAT: 0
CONSTIPATION: 0
MYALGIAS: 1
BREAST MASS: 0
ARTHRALGIAS: 0
JOINT SWELLING: 0
DYSURIA: 0
DIZZINESS: 0
EYE PAIN: 0
SHORTNESS OF BREATH: 0
HEARTBURN: 0
NAUSEA: 1
HEMATURIA: 0
WEAKNESS: 0
ABDOMINAL PAIN: 1
FREQUENCY: 0
PARESTHESIAS: 0
PALPITATIONS: 0
HEMATOCHEZIA: 0
FEVER: 0
CHILLS: 0
HEADACHES: 1

## 2022-06-06 NOTE — PROGRESS NOTES
SUBJECTIVE:   CC: Mora Reardon is an 31 year old woman who presents for preventive health visit.     Patient has been advised of split billing requirements and indicates understanding: Yes     Healthy Habits:     Getting at least 3 servings of Calcium per day:  Yes    Bi-annual eye exam:  NO    Dental care twice a year:  NO    Sleep apnea or symptoms of sleep apnea:  Daytime drowsiness    Diet:  Low salt    Frequency of exercise:  2-3 days/week    Duration of exercise:  15-30 minutes    Taking medications regularly:  Yes    Medication side effects:  None    PHQ-2 Total Score: 1    Additional concerns today:  No    Vaginal itching and lesion  Concern for STI  Itching was present prior to starting antibiotics.     Today's PHQ-2 Score:   PHQ-2 ( 1999 [employer]) 6/6/2022   Q1: Little interest or pleasure in doing things 1   Q2: Feeling down, depressed or hopeless 0   PHQ-2 Score 1   PHQ-2 Total Score (12-17 Years)- Positive if 3 or more points; Administer PHQ-A if positive -   Q1: Little interest or pleasure in doing things Not at all   Q2: Feeling down, depressed or hopeless Not at all   PHQ-2 Score 0       Abuse: Current or Past (Physical, Sexual or Emotional) - No  Do you feel safe in your environment? Yes    Have you ever done Advance Care Planning? (For example, a Health Directive, POLST, or a discussion with a medical provider or your loved ones about your wishes): No, advance care planning information given to patient to review.  Patient declined advance care planning discussion at this time.    Social History     Tobacco Use     Smoking status: Current Every Day Smoker     Packs/day: 0.50     Years: 6.00     Pack years: 3.00     Types: Cigarettes     Smokeless tobacco: Never Used   Substance Use Topics     Alcohol use: No     Alcohol/week: 0.0 standard drinks         Alcohol Use 6/6/2022   Prescreen: >3 drinks/day or >7 drinks/week? No   Prescreen: >3 drinks/day or >7 drinks/week? -       Reviewed orders  Pt is calling  And checking the status of previous message that was left on 3/20/20    with patient.  Reviewed health maintenance and updated orders accordingly - Yes  Labs reviewed in EPIC    Breast Cancer Screening:    FHS-7:   Breast CA Risk Assessment (FHS-7) 2/10/2021 6/6/2022   Did any of your first-degree relatives have breast or ovarian cancer? Yes Unknown   Did any of your relatives have bilateral breast cancer? Unknown Unknown   Did any man in your family have breast cancer? No No   Did any woman in your family have breast and ovarian cancer? Yes Yes   Did any woman in your family have breast cancer before age 50 y? Yes No   Do you have 2 or more relatives with breast and/or ovarian cancer? Yes Yes   Do you have 2 or more relatives with breast and/or bowel cancer? Yes Yes     Pertinent mammograms are reviewed under the imaging tab.    History of abnormal Pap smear:   Last 3 Pap and HPV Results:   PAP / HPV Latest Ref Rng & Units 2/10/2021 4/30/2019 4/29/2019   PAP (Historical) - NIL NIL -   HPV16 NEG:Negative Negative - Negative   HPV18 NEG:Negative Negative - Negative   HRHPV NEG:Negative Negative - Positive(A)     Reviewed and updated as needed this visit by clinical staff   Tobacco  Allergies  Meds  Problems  Med Hx  Surg Hx  Fam Hx  Soc   Hx          Reviewed and updated as needed this visit by Provider   Tobacco  Allergies  Meds  Problems  Med Hx  Surg Hx  Fam Hx               Review of Systems   Constitutional: Negative for chills and fever.   HENT: Negative for congestion, ear pain, hearing loss and sore throat.    Eyes: Negative for pain and visual disturbance.   Respiratory: Negative for cough and shortness of breath.    Cardiovascular: Negative for chest pain, palpitations and peripheral edema.   Gastrointestinal: Positive for abdominal pain, diarrhea and nausea. Negative for constipation, heartburn and hematochezia.   Breasts:  Negative for tenderness, breast mass and discharge.   Genitourinary: Positive for genital sores and pelvic pain. Negative for dysuria,  "frequency, hematuria, urgency, vaginal bleeding and vaginal discharge.   Musculoskeletal: Positive for myalgias. Negative for arthralgias and joint swelling.   Skin: Negative for rash.   Neurological: Positive for headaches. Negative for dizziness, weakness and paresthesias.   Psychiatric/Behavioral: Positive for mood changes. The patient is nervous/anxious.         OBJECTIVE:   /62 (Cuff Size: Adult Regular)   Pulse 76   Temp 98.5  F (36.9  C) (Tympanic)   Resp 16   Ht 1.562 m (5' 1.5\")   Wt 77.1 kg (170 lb)   LMP 02/01/2022 (Approximate)   BMI 31.60 kg/m    Physical Exam  GENERAL: healthy, alert and no distress  EYES: Eyes grossly normal to inspection, PERRL and conjunctivae and sclerae normal  HENT: ear canals and TM's normal, nose and mouth without ulcers or lesions  NECK: no adenopathy, no asymmetry, masses, or scars and thyroid normal to palpation  RESP: lungs clear to auscultation - no rales, rhonchi or wheezes  CV: regular rate and rhythm, normal S1 S2, no S3 or S4, no murmur, click or rub, no peripheral edema and peripheral pulses strong  ABDOMEN: soft, nontender, no hepatosplenomegaly, no masses and bowel sounds normal   (female): normal female external genitalia, normal urethral meatus , vaginal mucosa pink, moist, well rugated, vaginal discharge - moderate and white and normal cervix, adnexae, and uterus without masses.  MS: no gross musculoskeletal defects noted, no edema  SKIN: no suspicious lesions or rashes  NEURO: Normal strength and tone, mentation intact and speech normal  PSYCH: mentation appears normal, affect normal/bright    Diagnostic Test Results:  Results for orders placed or performed in visit on 06/06/22   Wet prep - Clinic Collect     Status: Abnormal    Specimen: Vagina; Swab   Result Value Ref Range    Trichomonas Absent Absent    Yeast Present (A) Absent    Clue Cells Absent Absent    WBCs/high power field 1+ (A) None       ASSESSMENT/PLAN:   (Z00.00) Routine general " "medical examination at a health care facility  (primary encounter diagnosis)    (Z01.419) Encounter for gynecological examination without abnormal finding  Comment: updated today  Plan: Gynecologic Cytology (PAP)          (B37.3) Candidiasis of vagina  Comment: Diflucan sent to the pharmacy for symptoms.   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 Bili, TP)          (F31.70) Bipolar disorder in full remission, most recent episode unspecified type (H)  Comment: reports moods stable, no current medications      COUNSELING:  Reviewed preventive health counseling, as reflected in patient instructions    Estimated body mass index is 31.6 kg/m  as calculated from the following:    Height as of this encounter: 1.562 m (5' 1.5\").    Weight as of this encounter: 77.1 kg (170 lb).      She reports that she has been smoking cigarettes. She has a 3.00 pack-year smoking history. She has never used smokeless tobacco.      Counseling Resources:  ATP IV Guidelines  Pooled Cohorts Equation Calculator  Breast Cancer Risk Calculator  BRCA-Related Cancer Risk Assessment: FHS-7 Tool  FRAX Risk Assessment  ICSI Preventive Guidelines  Dietary Guidelines for Americans, 2010  USDA's MyPlate  ASA Prophylaxis  Lung CA Screening    ADELAIDE Hinojosa [facility]  "

## 2022-06-07 ENCOUNTER — LAB (OUTPATIENT)
Dept: LAB | Facility: CLINIC | Age: 32
End: 2022-06-07
Payer: COMMERCIAL

## 2022-06-07 DIAGNOSIS — E26.81 BARTTER'S SYNDROME (H): ICD-10-CM

## 2022-06-07 LAB
ALBUMIN SERPL-MCNC: 3.7 G/DL (ref 3.4–5)
ALP SERPL-CCNC: 75 U/L (ref 40–150)
ALT SERPL W P-5'-P-CCNC: 23 U/L (ref 0–50)
ANION GAP SERPL CALCULATED.3IONS-SCNC: 6 MMOL/L (ref 3–14)
AST SERPL W P-5'-P-CCNC: 15 U/L (ref 0–45)
BILIRUB SERPL-MCNC: 0.4 MG/DL (ref 0.2–1.3)
BUN SERPL-MCNC: 17 MG/DL (ref 7–30)
C TRACH DNA SPEC QL NAA+PROBE: NEGATIVE
CALCIUM SERPL-MCNC: 8.9 MG/DL (ref 8.5–10.1)
CHLORIDE BLD-SCNC: 101 MMOL/L (ref 94–109)
CO2 SERPL-SCNC: 32 MMOL/L (ref 20–32)
CREAT SERPL-MCNC: 1.09 MG/DL (ref 0.52–1.04)
GFR SERPL CREATININE-BSD FRML MDRD: 68 ML/MIN/1.73M2
GLUCOSE BLD-MCNC: 97 MG/DL (ref 70–99)
N GONORRHOEA DNA SPEC QL NAA+PROBE: NEGATIVE
POTASSIUM BLD-SCNC: 2.8 MMOL/L (ref 3.4–5.3)
PROT SERPL-MCNC: 8 G/DL (ref 6.8–8.8)
SODIUM SERPL-SCNC: 139 MMOL/L (ref 133–144)

## 2022-06-07 PROCEDURE — 80053 COMPREHEN METABOLIC PANEL: CPT

## 2022-06-07 PROCEDURE — 36415 COLL VENOUS BLD VENIPUNCTURE: CPT

## 2022-06-07 RX ORDER — FLUCONAZOLE 150 MG/1
150 TABLET ORAL ONCE
Qty: 1 TABLET | Refills: 0 | Status: SHIPPED | OUTPATIENT
Start: 2022-06-07 | End: 2022-06-07

## 2022-06-08 LAB
BKR LAB AP GYN ADEQUACY: NORMAL
BKR LAB AP GYN INTERPRETATION: NORMAL
BKR LAB AP HPV REFLEX: NORMAL
BKR LAB AP PREVIOUS ABNL DX: NORMAL
BKR LAB AP PREVIOUS ABNORMAL: NORMAL
PATH REPORT.COMMENTS IMP SPEC: NORMAL
PATH REPORT.COMMENTS IMP SPEC: NORMAL
PATH REPORT.RELEVANT HX SPEC: NORMAL

## 2022-06-10 RX ORDER — POTASSIUM CHLORIDE 1500 MG/1
40 TABLET, EXTENDED RELEASE ORAL DAILY
Qty: 60 TABLET | Refills: 5 | Status: SHIPPED | OUTPATIENT
Start: 2022-06-10 | End: 2024-08-21

## 2022-06-13 LAB
HUMAN PAPILLOMA VIRUS 16 DNA: NEGATIVE
HUMAN PAPILLOMA VIRUS 18 DNA: NEGATIVE
HUMAN PAPILLOMA VIRUS FINAL DIAGNOSIS: NORMAL
HUMAN PAPILLOMA VIRUS OTHER HR: NEGATIVE

## 2022-11-14 NOTE — PROGRESS NOTES
[facility] Obstetrics Post-Partum Progress Note          Assessment and Plan:    Assessment:   Post-partum day #1  Normal spontaneous vaginal delivery  L&D complications: Intrauterine pregnancy at 37+1 weeks gestation      Doing well.  No excessive bleeding  Pain well-controlled.      Plan:   Ambulation encouraged  RhoGam given  Anticipate discharge tomorrow           Interval History:   Doing well.  Pain is well-controlled.  No fevers.  No history of foul-smelling vaginal discharge.  Good appetite.  Denies chest pain, shortness of breath, nausea or vomiting.  Vaginal bleeding is similar to a heavy menstrual flow.  Ambulatory.  Breastfeeding well.          Significant Problems:    None          Review of Systems:    The patient denies any chest pain, shortness of breath, excessive pain, fever, chills, purulent drainage from the wound, nausea or vomiting.          Medications:   All medications related to the patient's surgery have been reviewed          Physical Exam:   All vitals stable  Uterine fundus is firm, non-tender and at the level of the umbilicus          Data:         Abhinav New MD      Home

## 2022-11-20 ENCOUNTER — HEALTH MAINTENANCE LETTER (OUTPATIENT)
Age: 32
End: 2022-11-20

## 2022-11-30 ENCOUNTER — E-VISIT (OUTPATIENT)
Dept: FAMILY MEDICINE | Facility: CLINIC | Age: 32
End: 2022-11-30
Payer: COMMERCIAL

## 2022-11-30 DIAGNOSIS — H92.09 OTALGIA, UNSPECIFIED LATERALITY: Primary | ICD-10-CM

## 2022-11-30 PROCEDURE — 99207 PR NON-BILLABLE SERV PER CHARTING: CPT | Performed by: FAMILY MEDICINE

## 2023-01-28 ENCOUNTER — OFFICE VISIT (OUTPATIENT)
Dept: URGENT CARE | Facility: URGENT CARE | Age: 33
End: 2023-01-28
Payer: COMMERCIAL

## 2023-01-28 VITALS
HEART RATE: 86 BPM | DIASTOLIC BLOOD PRESSURE: 76 MMHG | OXYGEN SATURATION: 97 % | TEMPERATURE: 97.3 F | SYSTOLIC BLOOD PRESSURE: 128 MMHG

## 2023-01-28 DIAGNOSIS — M54.50 ACUTE MIDLINE LOW BACK PAIN WITHOUT SCIATICA: Primary | ICD-10-CM

## 2023-01-28 PROCEDURE — 99213 OFFICE O/P EST LOW 20 MIN: CPT

## 2023-01-28 RX ORDER — CYCLOBENZAPRINE HCL 10 MG
10 TABLET ORAL 3 TIMES DAILY PRN
Qty: 10 TABLET | Refills: 0 | Status: SHIPPED | OUTPATIENT
Start: 2023-01-28 | End: 2024-02-07

## 2023-01-28 RX ORDER — MELOXICAM 15 MG/1
15 TABLET ORAL DAILY
Qty: 10 TABLET | Refills: 0 | Status: SHIPPED | OUTPATIENT
Start: 2023-01-28 | End: 2024-02-07

## 2023-01-28 NOTE — PROGRESS NOTES
URGENT CARE  Assessment & Plan   Assessment:   Mora Reardon is a 32 year old female who's clinical presentation today is consistent with:   1. Acute midline low back pain without sciatica  - Spine  Referral; Future  - cyclobenzaprine (FLEXERIL) 10 MG tablet; Take 1 tablet (10 mg) by mouth 3 times daily as needed for muscle spasms  Dispense: 10 tablet; Refill: 0  - meloxicam (MOBIC) 15 MG tablet; Take 1 tablet (15 mg) by mouth daily  Dispense: 10 tablet; Refill: 0    No alarm signs or symptoms present   Differential Diagnoses for this patient's CC include    Spondyloarthropathy, ankylosing spondylitis,    Radicular low back pain, lumbosacral radiculopathy,    vertebral fracture, inflammatory process,   cauda equina, spinal stenosis     Plan:  will treat patient's back pain at this time symptomatically and supportively, this will include encouraging: using mobic and flexeril  to help decrease pain and inflammation, resting, applying ice/heat as needed.   Given the patient's presentation today suspect disc involvement and thus no radiological films ordered, further encouraged creams and rubs such as lidocaine or Voltaren    We discussed that acute back pain from a strain or sprain usually gets better in 1-2 weeks but back pain from disc disease, arthritis or spinal canal narrowing can last much longer, for this reason I encouraged the patient to  follow-up with their PCP for further evaluation and treatment if no improvement in the next 2-3 weeks. However if symptoms worsen they should follow up immediately; educated them to monitor for worsening symptoms such as: new weakness, numbness or tingling; new fever/chills, loss of strength in legs; or loss of bowel or bladder function.     Patient  is  agreeable to treatment plan and state they will follow-up if symptoms do not improve and/or if symptoms worsen (see patient's AVS 'monitor for' section for specific patient instructions given and discussed  regarding what to watch for and when to follow up)    Medications ordered are listed above, please see AVS for patient's specific and personalized discharge instructions given     ADELAIDE Garcia [facility]      ______________________________________________________________________        Subjective  Subjective     HPI: Mora Reardon  is a 32 year old  female who presents today for evaluation the following concerns:   Patient presents with midline low back pain which started 1 day ago, 1/27/23   Patient states this is acute. Condition is described as unchanging    Patient endorses pain is not radiating down her legs  Patient reports   associated symptoms including pain, spasms, stiffness, and tightness  Patient denies any changes of bowel or bladder. Patient denies any numbness or tingling   She relates precipitating factor related to back pain is: does not recalled    The patient has tried resting and NSAIDs  to help alleviate the pain.  Patient states helpful was none.   The patient relates certain situations tend to aggravate the pain including walking and sitting    She denies associated abdominal pain, difficulty with bowel movements, difficulty with urination, fever  Patient states she does not want an xray today       Allergies   Allergen Reactions     Keflex [Cephalexin Monohydrate] Hives and Rash     Patient Active Problem List   Diagnosis     Bipolar disorder (H)     Constipation     Bartter's syndrome     Patient non-compliance     CTS (carpal tunnel syndrome)     GERD (gastroesophageal reflux disease)     CARDIOVASCULAR SCREENING; LDL GOAL LESS THAN 160     Rh negative, antepartum     Cervical high risk HPV (human papillomavirus) test positive     IUD migration, initial encounter     Encounter for sterilization     Status post bilateral salpingectomy     Encounter for removal of intrauterine contraceptive device (IUD)     Bartter's syndrome (H)       Review of  Systems:  Pertinent review of systems as reflected in HPI, otherwise negative.     Objective  Objective    Physical Exam:  Vitals:    01/28/23 1032   BP: 128/76   Pulse: 86   Temp: 97.3  F (36.3  C)   TempSrc: Tympanic   SpO2: 97%      General:   alert and oriented, no acute distress, non-ill-appearing   Vital signs reviewed: afebrile and normotensive     Psy/mental status: cooperative   SKIN: intact   GI/: wnl, normal   MSK: Motion intact, strength adequate for age, no edema peripheral pulses +2 and equal bilaterally,  Toe walk: normal.  Heel walk: normal .  Back:      Inspection: normal posture        Palpation:   exaggerated tenderness to light touch at midline back       ROM: forward flexion to full range,       extension to full range, sidebending left to full range, sidebending right to full range,       rotation right to full range, and rotation left to full range.      Straight leg raises: patient unwilling to participate in this part of exam         Strength: +3/4 bilaterally       Sensation: normal.  Neuro:  motor, reflexes, cerebellar function, gait and coordination are all within normal limits, no numbness noted        I explained my diagnostic considerations and recommendations to the patient, who voiced understanding and agreement with the treatment plan.   All questions were answered.   We discussed potential side effects, risks and benefits of any prescribed or recommended therapies, as well as expectations for response to treatments.  Please see AVS for any patient instructions & handouts given.   Patient was advised to contact the Nurse Care Line, their Primary Care provider, Urgent Care, or the Emergency Department if there are new or worsening symptoms, or call 911 for emergencies.        ______________________________________________________________________          Patient Instructions   Diagnosis: Back sprain/strain, disc      Plan:     RICE: rest, ice / heat - alternating, gentle stretching       Muscle relaxants      Ibuprofen and tylenol for pain today (mobic, naproxen) (Celebrex - cox2- GI upset/bleeding)     Can try: creams and rubs- lidocaine, Voltaren     Lidocaine patches     Acute back pain from a sprain or strain usually gets better in 1 to 2 weeks. Back pain related to disk disease, arthritis in the spinal joints, or narrowing of the spinal canal  (spinal stenosis) can become chronic and last for months or years.}     Will refer you to spine care  for further follow up and treatment regarding your back pain - make apt, if better can cancel, if not keep and try:     Physical therapy, acupuncture, chiropractic, injections,     Ortho: will discuss stronger medications such as narcotics   Monitor for:   1. Monitor for pain that worsens and does not improve over 2-6 weeks   2. Numbness and tingling down leg   3. Loss of function of bowel or bladder   4. New fevers, chills, sweats   5. Increased weakness or loss of strength in legs     Back Pain   Back pain is one of the most common problems. The good news is that most people feel better in 1 to 2 weeks, and most of the rest in 1 to 2 months.   Most people can remain active.  People who have pain describe it differently--not everyone is the same.  1. The pain can be sharp, stabbing, shooting, aching, cramping or burning.  2. Movement, standing, bending, lifting, sitting, or walking may worsen pain.  3. It can be limited to one spot or area, or it can be more generalized.  4. It can spread upwards, to the front, or go down your arms or legs (sciatica).  5. It can cause muscle spasm.  Most of the time, mechanical problems with the muscles or spine cause the pain.   Mechanical problems are usually caused by an injury to the muscles or ligaments.   Illness can cause back pain, but it's usually not caused by a serious illness.   Mechanical problems include:     Physical activity such as sports, exercise, work, or normal activity    Overexertion, lifting,  pushing, pulling incorrectly or too aggressively    Sudden twisting, bending, or stretching from an accident, or accidental movement    Poor posture    Stretching or moving wrong, without noticing pain at the time    Poor coordination, lack of regular exercise (check with your doctor about this)    Spinal disc disease or arthritis    Stress    The pain can also be related to pregnancy, or illness like appendicitis, bladder or kidney infections, pelvic infections, and many other things.    Other things to try:     When in bed, try to find a position of comfort. A firm mattress is best. Try lying flat on your back with pillows under your knees. You can also try lying on your side with your  knees bent up toward your chest and a pillow between your knees.    Don't sit for long periods, as in a long car ride or during other travel. This puts more stress on the lower back than standing or walking.    You can alternate ice and heat therapy.     Therapeutic massage can help relax the back muscles without stretching them.    Be aware of safe lifting methods and don't lift anything without stretching first.

## 2023-01-28 NOTE — LETTER
[facility] CARE [place] [id number] [address]  Phone: [phone]  Fax: [phone]    January 28, 2023        Mora Reardon  [address] [facility] [address]          To whom it may concern:    RE: Mora Reardon    Patient was seen and treated today at our clinic.  Patient may return to work on Wednesday 2/1/23    Please contact me for questions or concerns.      Sincerely,        ADELAIDE Garcia CNP

## 2023-01-28 NOTE — PATIENT INSTRUCTIONS
Diagnosis: Back sprain/strain, disc      Plan:   RICE: rest, ice / heat - alternating, gentle stretching    Muscle relaxants    Ibuprofen and tylenol for pain today (mobic, naproxen) (Celebrex - cox2- GI upset/bleeding)   Can try: creams and rubs- lidocaine, Voltaren   Lidocaine patches   Acute back pain from a sprain or strain usually gets better in 1 to 2 weeks. Back pain related to disk disease, arthritis in the spinal joints, or narrowing of the spinal canal  (spinal stenosis) can become chronic and last for months or years.}   Will refer you to spine care  for further follow up and treatment regarding your back pain - make apt, if better can cancel, if not keep and try:   Physical therapy, acupuncture, chiropractic, injections,   Ortho: will discuss stronger medications such as narcotics   Monitor for:   Monitor for pain that worsens and does not improve over 2-6 weeks   Numbness and tingling down leg   Loss of function of bowel or bladder   New fevers, chills, sweats   Increased weakness or loss of strength in legs     Back Pain   Back pain is one of the most common problems. The good news is that most people feel better in 1 to 2 weeks, and most of the rest in 1 to 2 months.   Most people can remain active.  People who have pain describe it differently--not everyone is the same.  The pain can be sharp, stabbing, shooting, aching, cramping or burning.  Movement, standing, bending, lifting, sitting, or walking may worsen pain.  It can be limited to one spot or area, or it can be more generalized.  It can spread upwards, to the front, or go down your arms or legs (sciatica).  It can cause muscle spasm.  Most of the time, mechanical problems with the muscles or spine cause the pain.   Mechanical problems are usually caused by an injury to the muscles or ligaments.   Illness can cause back pain, but it's usually not caused by a serious illness.   Mechanical problems include:   Physical activity such as sports,  exercise, work, or normal activity  Overexertion, lifting, pushing, pulling incorrectly or too aggressively  Sudden twisting, bending, or stretching from an accident, or accidental movement  Poor posture  Stretching or moving wrong, without noticing pain at the time  Poor coordination, lack of regular exercise (check with your doctor about this)  Spinal disc disease or arthritis  Stress  The pain can also be related to pregnancy, or illness like appendicitis, bladder or kidney infections, pelvic infections, and many other things.    Other things to try:   When in bed, try to find a position of comfort. A firm mattress is best. Try lying flat on your back with pillows under your knees. You can also try lying on your side with your  knees bent up toward your chest and a pillow between your knees.  Don't sit for long periods, as in a long car ride or during other travel. This puts more stress on the lower back than standing or walking.  You can alternate ice and heat therapy.   Therapeutic massage can help relax the back muscles without stretching them.  Be aware of safe lifting methods and don't lift anything without stretching first.

## 2023-02-01 ENCOUNTER — TELEPHONE (OUTPATIENT)
Dept: ORTHOPEDICS | Facility: CLINIC | Age: 33
End: 2023-02-01
Payer: COMMERCIAL

## 2023-02-01 DIAGNOSIS — M54.50 LOW BACK PAIN: Primary | ICD-10-CM

## 2023-02-01 NOTE — TELEPHONE ENCOUNTER
Called patient and explained bowel protocol as she has not had a  Bowl movement in a few days.        Patient understood and will be seen on Monday with Dr. Judah Addison LPN

## 2023-02-01 NOTE — TELEPHONE ENCOUNTER
[facility] Call Center    Phone Message    May a detailed message be left on voicemail: no     Reason for Call: Other: Patient returning a call from Cyn    Action Taken: Message routed to:  [facility]    Travel Screening: Not Applicable

## 2023-02-01 NOTE — TELEPHONE ENCOUNTER
Per protocol, pt has red flags symptoms, pt states there has been changes to her bowel functions. Pt is schedule with Dr. So on 2/6 at 1pm. Is appt appropriate with provider?

## 2023-02-02 NOTE — TELEPHONE ENCOUNTER
DIAGNOSIS: Acute Midline Low Back Pain   APPOINTMENT DATE: 02/06/2023   NOTES STATUS DETAILS   OFFICE NOTE from referring provider Internal 01/28/2023 - Cait Martinez CNP - [facility]   OFFICE NOTE from other specialist Internal 05/03/2021 - Shantelle Hernández MD - [facility] Pain    04/22/2021 - Uri Alcocer MD - [facility]   MEDICATION LIST Internal    LABS     MRI PACS Internal:  03/30/2006 - L Spine   CT SCAN PACS Internal:  02/25/2021 - Pelvis  11/24/2015 - Abd/Pel   XRAYS (IMAGES & REPORTS) PACS Internal:  04/22/2021, 03/28/2006 - L Spine

## 2023-02-06 ENCOUNTER — OFFICE VISIT (OUTPATIENT)
Dept: ORTHOPEDICS | Facility: CLINIC | Age: 33
End: 2023-02-06
Payer: COMMERCIAL

## 2023-02-06 ENCOUNTER — ANCILLARY PROCEDURE (OUTPATIENT)
Dept: GENERAL RADIOLOGY | Facility: CLINIC | Age: 33
End: 2023-02-06
Attending: ORTHOPAEDIC SURGERY
Payer: COMMERCIAL

## 2023-02-06 ENCOUNTER — PRE VISIT (OUTPATIENT)
Dept: ORTHOPEDICS | Facility: CLINIC | Age: 33
End: 2023-02-06

## 2023-02-06 VITALS — HEIGHT: 62 IN | BODY MASS INDEX: 29.63 KG/M2 | WEIGHT: 161 LBS

## 2023-02-06 DIAGNOSIS — M54.17 LUMBOSACRAL RADICULOPATHY: ICD-10-CM

## 2023-02-06 DIAGNOSIS — E26.81 BARTTER'S SYNDROME (H): ICD-10-CM

## 2023-02-06 DIAGNOSIS — M54.50 ACUTE MIDLINE LOW BACK PAIN WITHOUT SCIATICA: Primary | ICD-10-CM

## 2023-02-06 DIAGNOSIS — M43.10 SPONDYLOLISTHESIS, GRADE 2: ICD-10-CM

## 2023-02-06 DIAGNOSIS — F31.70 BIPOLAR DISORDER IN FULL REMISSION, MOST RECENT EPISODE UNSPECIFIED TYPE (H): ICD-10-CM

## 2023-02-06 PROCEDURE — 99204 OFFICE O/P NEW MOD 45 MIN: CPT | Performed by: ORTHOPAEDIC SURGERY

## 2023-02-06 PROCEDURE — 72100 X-RAY EXAM L-S SPINE 2/3 VWS: CPT | Mod: TC | Performed by: RADIOLOGY

## 2023-02-06 RX ORDER — GABAPENTIN 300 MG/1
300 CAPSULE ORAL 3 TIMES DAILY
Qty: 60 CAPSULE | Refills: 3 | Status: SHIPPED | OUTPATIENT
Start: 2023-02-06 | End: 2024-02-09
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
## 2023-02-06 NOTE — NURSING NOTE
"Reason For Visit:   Chief Complaint   Patient presents with     Consult     acute midline low back pain/Cait Martinez       Primary MD: Norma Montes  Ref. MD: Cait Martinez    ?  No  Occupation [employer].  Currently working? Yes.  Work status?  Full time.    Date of injury: No  Type of injury: No.    Date of surgery: No  Type of surgery: No.    Smoker: Yes  Request smoking cessation information: No    Ht 1.57 m (5' 1.81\")   Wt 73 kg (161 lb)   BMI 29.63 kg/m      Pain Assessment  Patient Currently in Pain: Yes  0-10 Pain Scale: 5  Primary Pain Location: Back    Oswestry (CARYL) Questionnaire    OSWESTRY DISABILITY INDEX 2/6/2023   Count 9   Sum 29   Oswestry Score (%) 64.44   Some recent data might be hidden        Visual Analog Pain Scale  Back Pain Scale 0-10: 5  Right leg pain: 0  Left leg pain: 0  Neck Pain Scale 0-10: 0  Right arm pain: 0  Left arm pain: 0 (hand goes numb at times)    Promis 10 Assessment    No flowsheet data found.             Natalie Rene LPN  "
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
## 2023-02-22 ENCOUNTER — HOSPITAL ENCOUNTER (OUTPATIENT)
Dept: PHYSICAL THERAPY | Facility: CLINIC | Age: 33
Setting detail: THERAPIES SERIES
Discharge: HOME | End: 2023-02-22
Attending: ORTHOPAEDIC SURGERY
Payer: COMMERCIAL

## 2023-02-22 PROCEDURE — 97110 THERAPEUTIC EXERCISES: CPT | Mod: GP | Performed by: PHYSICAL THERAPIST

## 2023-02-22 PROCEDURE — 97161 PT EVAL LOW COMPLEX 20 MIN: CPT | Mod: GP | Performed by: PHYSICAL THERAPIST
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
## 2023-05-08 ENCOUNTER — PATIENT OUTREACH (OUTPATIENT)
Dept: CARE COORDINATION | Facility: CLINIC | Age: 33
End: 2023-05-08
Payer: COMMERCIAL

## 2023-05-22 ENCOUNTER — PATIENT OUTREACH (OUTPATIENT)
Dept: CARE COORDINATION | Facility: CLINIC | Age: 33
End: 2023-05-22
Payer: COMMERCIAL

## 2023-07-08 ENCOUNTER — HEALTH MAINTENANCE LETTER (OUTPATIENT)
Age: 33
End: 2023-07-08

## 2023-09-01 ENCOUNTER — E-VISIT (OUTPATIENT)
Dept: URGENT CARE | Facility: CLINIC | Age: 33
End: 2023-09-01
Payer: COMMERCIAL

## 2023-09-01 DIAGNOSIS — N89.8 VAGINAL DISCHARGE: Primary | ICD-10-CM

## 2023-09-01 PROCEDURE — 99207 PR NON-BILLABLE SERV PER CHARTING: CPT | Performed by: PHYSICIAN ASSISTANT

## 2023-09-01 NOTE — PATIENT INSTRUCTIONS
Dear Mora Reardon,    We are sorry you are not feeling well. Based on the responses you provided, it is recommended that you be seen in-person in urgent care so we can better evaluate your symptoms. Please click here to find the nearest urgent care location to you.   You will not be charged for this Visit. Thank you for trusting us with your care.    Kari Brennan PA-C

## 2023-10-24 NOTE — PROGRESS NOTES
Outpatient Physical Therapy Discharge Note     Patient: Mora Reardon  : 1990    Evaluation date:  23     Referring Provider: Judah    Therapy Diagnosis: back pain    Client Self Report:   Current status is unknown since patient did not return for further PT visits.    Objective Measurements:  Current objective status is unknown since patient failed to complete treatment     Goals:  Goal Identifier standing - prolonged periods   Goal Description Pt will be able to stand 60+ min w/o radicualr symptoms to compelte work tasks   Target Date 03/15/23   Date Met      Progress (detail required for progress note):       Goal Identifier lumbar ROM   Goal Description Pt will be able to demonstrate full lumbar ROM in order to be able to  object off of the ground without pain or radicular symptoms.   Target Date 03/15/23   Date Met      Progress (detail required for progress note):       Goal Identifier lifting   Goal Description Pt will be able to lift 30 lbs w/o pain to be able to complete lifting tasks at work.   Target Date 23   Date Met      Progress (detail required for progress note):       Goal Identifier HEP   Goal Description Pt will be compliant and competent in HEP to allow them to achieve maximal strength and reduce pain mobility   Target Date 23   Date Met      Progress (detail required for progress note):       Goal Identifier     Goal Description     Target Date     Date Met      Progress (detail required for progress note):       Goal Identifier     Goal Description     Target Date     Date Met      Progress (detail required for progress note):       Goal Identifier     Goal Description     Target Date     Date Met      Progress (detail required for progress note):       Goal Identifier     Goal Description     Target Date     Date Met      Progress (detail required for progress note):         Progress towards Goals:   Progress this reporting period: Pt has failed to  schedule f/u visits within 30 days from last visit thus is being d/c from therapy at this time. Objective measures are all taken from last visit. Current status is unknown at this time.    Plan:  Discharge from therapy.    Discharge:    Reason for Discharge: Patient has failed to schedule further appointments.    Equipment Issued: none    Discharge Plan:  Not completed since patient failed to schedule further appointments.    Deanna Celestin  Physical Therapist  [address] [address]  [email or web address]   [email or web address]   Office: [phone] Fax: [phone]

## 2024-02-07 ENCOUNTER — E-VISIT (OUTPATIENT)
Dept: FAMILY MEDICINE | Facility: CLINIC | Age: 34
End: 2024-02-07
Payer: COMMERCIAL

## 2024-02-07 DIAGNOSIS — M54.50 ACUTE MIDLINE LOW BACK PAIN WITHOUT SCIATICA: ICD-10-CM

## 2024-02-07 PROCEDURE — 99207 PR NON-BILLABLE SERV PER CHARTING: CPT | Performed by: NURSE PRACTITIONER

## 2024-02-07 RX ORDER — MELOXICAM 15 MG/1
15 TABLET ORAL DAILY
Qty: 15 TABLET | Refills: 0 | Status: SHIPPED | OUTPATIENT
Start: 2024-02-07 | End: 2024-08-04

## 2024-02-07 RX ORDER — CYCLOBENZAPRINE HCL 10 MG
5-10 TABLET ORAL 3 TIMES DAILY PRN
Qty: 20 TABLET | Refills: 0 | Status: SHIPPED | OUTPATIENT
Start: 2024-02-07 | End: 2024-08-04

## 2024-02-07 NOTE — LETTER
February 7, 2024      Mora Reardon  [address] [address] [id number]        To Whom It May Concern:    Please excuse Mora until 2/12/24 due to injury.    Sincerely,        ADELAIDE Hinojosa CNP

## 2024-02-07 NOTE — LETTER
February 12, 2024      Mora Reardon  [address] [address] [facility] [address]        To Whom It May Concern:    Mora Reardon was seen in our clinic. She may return to work with the following: limited to light duty - lifting no greater than 10 pounds and limit bending/twisting due to back injury.       Sincerely,       ADELAIDE Hinojosa CNP

## 2024-02-08 NOTE — PATIENT INSTRUCTIONS
Thank you for choosing us for your care. I have placed an order for a prescription so that you can start treatment. View your full visit summary for details by clicking on the link below. Your pharmacist will able to address any questions you may have about the medication.      If you're not feeling better within 2-3 weeks please schedule an appointment.  You can schedule an appointment right here in [facility], or call [phone]  If the visit is for the same symptoms as your eVisit, we'll refund the cost of your eVisit if seen within seven days.    Caring for Your Back    You are not alone.    Low back pain is very common. Nearly half of all adults will have low back pain in any given year. The good news is that back pain is rarely a danger to your health. Most people can manage their back pain on their own. About half of people start feeling better within two weeks. In 9 out of 10 cases, low back pain goes away or no longer limits daily activity within 6 weeks.     Your outlook is good!     Your symptoms tell us that your low back pain is most likely not a danger to you. Most of the time we will not know the exact cause of low back pain, even if you see a doctor or have an MRI. However, treatment can still work without knowing the cause of the pain. Less than 1 in 100 people need surgery for their back pain.     What can I do about my low back pain?     There are three basic things you can do to ease low back pain and help it go away.     Use heat or cold packs.    Take medicine as directed.    Use positions, movements and exercises.    Using heat or cold packs:    Try cold packs or gentle heat to ease your pain.  Use whichever gives you the most relief. Apply the cold pack or heat for 15 minutes at a time, as often as needed.    Taking medicine:    If taking over-the-counter medicine:    Take ibuprofen (Advil, Motrin) 600 mg three times a day as needed for pain.  OR    Take Aleve (naproxen) 220 to 440 mg two  times a day as needed for pain. If your doctor prescribed a muscle relaxant (cyclobenzaprine 10 mg.):    Take   to 1 tablet at bedtime.    Do not drive when taking this medicine. This drug may make you sleepy.     Using positions, movements and exercises:    Research tells us that moving your joints and muscles can help you recover from back pain. Such activity should be simple and gentle. Use the positions below as well as walking to help relieve your pain. Try taking a short walk every 3 to 4 hours during the day. Walk for a few minutes inside your home or take longer walks outside, on a treadmill or at a mall. Slowly increase the amount of time you walk. Expect discomfort when you begin, but it should lessen as your back starts to heal. When your back feels better, walk daily to keep your back and body healthy.    Finding a position that is comfortable:    When your back pain is new, certain positions will ease your pain. Gently try each of the positions below until you find one that is helpful. Once you find a position of comfort, use it as often as you like when you are resting. You will recover faster if you combine rest with activity.    * Lie on your back with your legs bent. You can do this by placing a pillow under your knees or lie on the floor and rest your lower legs on the seat of a chair.  * Lie on your side with your knees bent and place a pillow between your knees.    Lie on your stomach over pillows.       When should I call my doctor?    Your back pain should improve over the first couple of weeks. As it improves, you should be able to return to your normal activities.  But call your doctor if:      You have a sudden change in your ability to control your bladder or bowels.    You begin to feel tingling in your groin or legs.    The pain spreads down your leg and into your foot.    Your toes, feet or leg muscles begin to feel weak.    You feel generally unwell or sick.    Your pain gets  worse.    If you are deaf or hard of hearing, please let us know. We provide many free services including sign language interpreters, oral interpreters, TTYs, telephone amplifiers, note takers and written materials.    For informational purposes only. Not to replace the advice of your health care provider. Copyright   2013 [facility]. All rights reserved. [employer] 335907 - 04/13.

## 2024-02-09 ENCOUNTER — MYC REFILL (OUTPATIENT)
Dept: ORTHOPEDICS | Facility: CLINIC | Age: 34
End: 2024-02-09
Payer: COMMERCIAL

## 2024-02-09 ENCOUNTER — THERAPY VISIT (OUTPATIENT)
Dept: PHYSICAL THERAPY | Facility: CLINIC | Age: 34
End: 2024-02-09
Attending: NURSE PRACTITIONER
Payer: COMMERCIAL

## 2024-02-09 DIAGNOSIS — M43.10 SPONDYLOLISTHESIS, GRADE 2: ICD-10-CM

## 2024-02-09 DIAGNOSIS — M54.50 ACUTE MIDLINE LOW BACK PAIN WITHOUT SCIATICA: ICD-10-CM

## 2024-02-09 DIAGNOSIS — M54.17 LUMBOSACRAL RADICULOPATHY: ICD-10-CM

## 2024-02-09 PROCEDURE — 97161 PT EVAL LOW COMPLEX 20 MIN: CPT | Mod: GP | Performed by: PHYSICAL THERAPIST

## 2024-02-09 PROCEDURE — 97110 THERAPEUTIC EXERCISES: CPT | Mod: GP | Performed by: PHYSICAL THERAPIST

## 2024-02-09 PROCEDURE — 97140 MANUAL THERAPY 1/> REGIONS: CPT | Mod: GP | Performed by: PHYSICAL THERAPIST

## 2024-02-09 RX ORDER — GABAPENTIN 300 MG/1
300 CAPSULE ORAL 3 TIMES DAILY
Qty: 60 CAPSULE | Refills: 3 | Status: SHIPPED | OUTPATIENT
Start: 2024-02-09 | End: 2024-07-06
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
## 2024-02-26 ENCOUNTER — E-VISIT (OUTPATIENT)
Dept: FAMILY MEDICINE | Facility: CLINIC | Age: 34
End: 2024-02-26
Payer: COMMERCIAL

## 2024-02-26 DIAGNOSIS — A08.4 VIRAL GASTROENTERITIS: Primary | ICD-10-CM

## 2024-02-26 PROCEDURE — 99207 PR NON-BILLABLE SERV PER CHARTING: CPT | Performed by: NURSE PRACTITIONER

## 2024-02-27 NOTE — PATIENT INSTRUCTIONS
Diarrhea: Care Instructions  Overview     Diarrhea is loose, watery stools (bowel movements). The exact cause is often hard to find. Sometimes diarrhea is your body's way of getting rid of what caused an upset stomach. Viruses, food poisoning, and many medicines can cause diarrhea. Some people get diarrhea in response to emotional stress, anxiety, or certain foods.  Almost everyone has diarrhea now and then. It usually isn't serious, and your stools will return to normal soon. The important thing to do is replace the fluids you have lost, so you can prevent dehydration.  The doctor has checked you carefully, but problems can develop later. If you notice any problems or new symptoms, get medical treatment right away.  Follow-up care is a key part of your treatment and safety. Be sure to make and go to all appointments, and call your doctor if you are having problems. It's also a good idea to know your test results and keep a list of the medicines you take.  How can you care for yourself at home?  Watch for signs of dehydration, which means your body has lost too much water. Dehydration is a serious condition and should be treated right away. Signs of dehydration are:  Increasing thirst and dry eyes and mouth.  Feeling faint or lightheaded.  A smaller amount of urine than normal.  To prevent dehydration, drink plenty of fluids. Choose water and other clear liquids until you feel better. If you have kidney, heart, or liver disease and have to limit fluids, talk with your doctor before you increase the amount of fluids you drink.  When you feel like eating, start with small amounts of food.  The doctor may recommend that you take over-the-counter medicine, such as loperamide (Imodium). Read and follow all instructions on the label. Do not use this medicine if you have bloody diarrhea, a high fever, or other signs of serious illness. Call your doctor if you think you are having a problem with your medicine.  When should  "you call for help?   Call 911 anytime you think you may need emergency care. For example, call if:    You passed out (lost consciousness).     Your stools are maroon or very bloody.   Call your doctor now or seek immediate medical care if:    You are dizzy or lightheaded, or you feel like you may faint.     Your stools are black and look like tar, or they have streaks of blood.     You have new or worse belly pain.     You have symptoms of dehydration, such as:  Dry eyes and a dry mouth.  Passing only a little urine.  Cannot keep fluids down.     You have a new or higher fever.   Watch closely for changes in your health, and be sure to contact your doctor if:    Your diarrhea is getting worse.     You see pus in the diarrhea.     You are not getting better after 2 days (48 hours).   Where can you learn more?  Go to https://www.[employer].net/patiented  Enter W335 in the search box to learn more about \"Diarrhea: Care Instructions.\"  Current as of: March 21, 2023               Content Version: 13.8    0396-4034 [employer].   Care instructions adapted under license by your healthcare professional. If you have questions about a medical condition or this instruction, always ask your healthcare professional. [employer] disclaims any warranty or liability for your use of this information.      "

## 2024-03-12 ENCOUNTER — OFFICE VISIT (OUTPATIENT)
Dept: URGENT CARE | Facility: URGENT CARE | Age: 34
End: 2024-03-12
Payer: COMMERCIAL

## 2024-03-12 ENCOUNTER — E-VISIT (OUTPATIENT)
Dept: FAMILY MEDICINE | Facility: CLINIC | Age: 34
End: 2024-03-12
Payer: COMMERCIAL

## 2024-03-12 ENCOUNTER — ANCILLARY PROCEDURE (OUTPATIENT)
Dept: GENERAL RADIOLOGY | Facility: CLINIC | Age: 34
End: 2024-03-12
Attending: FAMILY MEDICINE
Payer: COMMERCIAL

## 2024-03-12 VITALS
TEMPERATURE: 97.8 F | RESPIRATION RATE: 16 BRPM | HEART RATE: 72 BPM | BODY MASS INDEX: 28.71 KG/M2 | WEIGHT: 156 LBS | OXYGEN SATURATION: 98 % | DIASTOLIC BLOOD PRESSURE: 62 MMHG | SYSTOLIC BLOOD PRESSURE: 108 MMHG

## 2024-03-12 DIAGNOSIS — R05.1 ACUTE COUGH: Primary | ICD-10-CM

## 2024-03-12 DIAGNOSIS — J98.8 RESPIRATORY INFECTION: ICD-10-CM

## 2024-03-12 DIAGNOSIS — J40 BRONCHITIS: Primary | ICD-10-CM

## 2024-03-12 DIAGNOSIS — F17.200 TOBACCO USE DISORDER: ICD-10-CM

## 2024-03-12 PROCEDURE — 99207 PR NON-BILLABLE SERV PER CHARTING: CPT | Performed by: NURSE PRACTITIONER

## 2024-03-12 PROCEDURE — 99213 OFFICE O/P EST LOW 20 MIN: CPT | Performed by: FAMILY MEDICINE

## 2024-03-12 PROCEDURE — 71046 X-RAY EXAM CHEST 2 VIEWS: CPT | Mod: TC | Performed by: RADIOLOGY

## 2024-03-12 RX ORDER — PREDNISONE 20 MG/1
TABLET ORAL
Qty: 12 TABLET | Refills: 0 | Status: SHIPPED | OUTPATIENT
Start: 2024-03-12 | End: 2024-04-21

## 2024-03-12 RX ORDER — ALBUTEROL SULFATE 90 UG/1
2 AEROSOL, METERED RESPIRATORY (INHALATION) EVERY 4 HOURS PRN
Qty: 18 G | Refills: 0 | Status: SHIPPED | OUTPATIENT
Start: 2024-03-12

## 2024-03-12 NOTE — PATIENT INSTRUCTIONS
Dear Mora Reardon,    We are sorry you are not feeling well. Based on the responses you provided, it is recommended that you be seen in-person in urgent care so we can better evaluate your symptoms. Please click here to find the nearest urgent care location to you.   You will not be charged for this Visit. Thank you for trusting us with your care.    ADELAIDE Lerma CNP

## 2024-03-12 NOTE — PROGRESS NOTES
(J40) Bronchitis  (primary encounter diagnosis)  Comment:     Possible pneumonitis after review of chest x-ray.  Continue treatment below.    Plan: amoxicillin-clavulanate (AUGMENTIN) 875-125 MG         tablet, predniSONE (DELTASONE) 20 MG tablet,         albuterol (PROAIR HFA/PROVENTIL HFA/VENTOLIN         HFA) 108 (90 Base) MCG/ACT inhaler            (J98.8) Respiratory infection  Comment:   Plan: XR Chest 2 Views            (F17.200) Tobacco use disorder  Comment:   Plan:         I did contact the patient about the possible small area of pneumonitis on chest x-ray.  Asked her to be sure to take the above treatments and observe carefully.  Follow-up is advised 2 to 3 weeks in the future when her infection subsides or certainly return sooner if symptoms are worsening.        CHIEF COMPLAINT    Cough and headache      HISTORY    33-year-old woman had some viral symptoms over a week ago.  Then 5 or 6 days ago she developed a cough.  She presents now with cough and headache.  She is getting yellowish sputum production.  Has not noticed fever.    She is a smoker.  She does not really want to tell me how much.    She does not use an inhaler.      REVIEW OF SYSTEMS    No fever noted.  No ear pain or sore throat.  No chest pain.  No nausea or abdominal pain.        EXAM  /62   Pulse 72   Temp 97.8  F (36.6  C) (Tympanic)   Resp 16   Wt 70.8 kg (156 lb)   SpO2 98%   BMI 28.71 kg/m      Patient with frequent congested sounding cough.  Pharynx unremarkable.  No cervical adenopathy.  Chest diffuse bilateral rhonchi and some basilar rales.  Extremities without edema.      Results for orders placed or performed in visit on 03/12/24   XR Chest 2 Views     Status: None    Narrative    EXAM: XR CHEST 2 VIEWS  LOCATION: [facility]  DATE: 3/12/2024    INDICATION: worsening cough, headache  COMPARISON: None.      Impression    IMPRESSION:   There is a probable patchy infiltrate in the  retrocardiac left base posterior medially. On the lateral view this has a slightly nodular appearance. This is favored to represent an infectious infiltrate however given the nodular appearance a repeat PA and   lateral radiograph is recommended once the acute illness subsides to ensure resolution.    Chest otherwise negative.    __    NOTE: ABNORMAL REPORT    THE DICTATION ABOVE DESCRIBES AN ABNORMALITY FOR WHICH FOLLOW-UP IS NEEDED.

## 2024-03-28 NOTE — PROGRESS NOTES
Physical Therapy Discharge      DISCHARGE  Reason for Discharge: Patient has failed to schedule further appointments.    Equipment Issued: none    Discharge Plan: Patient to continue home program.    Referring Provider:  Norma Montes     02/09/24 0500   Appointment Info   Signing clinician's name / credentials Dipti Delgado PT DPT CLT   Visits Used 1   Medical Diagnosis Acute Midline Low Back Pain Without Sciatica   PT Tx Diagnosis LBP limiting mobility   Progress Note/Certification   Onset of illness/injury or Date of Surgery 02/07/24  (date of referral)   Therapy Frequency 1 x a week   Predicted Duration w+10   Progress Note Due Date 04/19/24   GOALS   PT Goals 2;3   PT Goal 1   Goal Identifier Lumbar flexion   Goal Description Pt will demonstrate 100* lumbar flexion in order to  an item off the floor.   Target Date 03/08/24   PT Goal 2   Goal Identifier TrA   Goal Description Pt will demonstrate ability to maintain transverse abdominis contraction with dynamic movements in order to have reduced symptoms with dynamic movements.   Target Date 04/19/24   PT Goal 3   Goal Identifier HEP   Goal Description Pt will be independent with HEP in order to maintain improvements upon discharge.   Target Date 04/19/24   Subjective Report   Subjective Report see eval   Objective Measures   Objective Measures Objective Measure 1   Objective Measure 1   Objective Measure Lumbar ROM   Details Flex: 90*; Extension: 40*   Treatment Interventions (PT)   Interventions Therapeutic Procedure/Exercise;Manual Therapy   Therapeutic Procedure/Exercise   PTRx Ther Proc 1 Standing Extension at Counter Supported   PTRx Ther Proc 1 - Details x 10   PTRx Ther Proc 2 Hip AROM Standing Extension   PTRx Ther Proc 2 - Details x 10   PTRx Ther Proc 3 All 4s Cat Cow   PTRx Ther Proc 3 - Details x 10   PTRx Ther Proc 4 Supine Lumbar Hip Roll   PTRx Ther Proc 4 - Details x 10   Therapeutic Procedures: strength, endurance, ROM, flexibility  minutes (66147) 20   Ther Proc 1 Abdominal Brace Transverse Abdominis   Ther Proc 1 - Details x 10   Skilled Intervention stretching and strengthening to improve symptoms   Patient Response/Progress Pt demonstrates weakness in her core and discogenic pain and would benefit from core/hip strengthening and positional exercises to improve symptoms.   Manual Therapy   Manual Therapy: Mobilization, MFR, MLD, friction massage minutes (89654) 10   Manual Therapy Manual Therapy 2   Manual Therapy 1 Manual traction   Manual Therapy 1 - Details 90/90 with stability ball x 5 minutes - reports reduced symptoms   Skilled Intervention MET and Manual traction to improve symptoms   Patient Response/Progress Pt reports improved symptoms with traction; minimal change in symptoms following SIJ METs   Manual Therapy 2 R innomintate posterior rotation MET   Manual Therapy 2 - Details shot gun technique x 5 reps; SIJ MET x 5 reps   Eval/Assessments   PT Eval, Low Complexity Minutes (06647) 25   Education   Learner/Method Patient   Education Comments anatomy of lumbar spine   Plan   Home program PTRx   Plan for next session manual traction, assess SIJ, progress core strengthening and hip strengthening, add scapular strability   Total Session Time   Timed Code Treatment Minutes 30   Total Treatment Time (sum of timed and untimed services) 55

## 2024-04-11 ENCOUNTER — MYC MEDICAL ADVICE (OUTPATIENT)
Dept: FAMILY MEDICINE | Facility: CLINIC | Age: 34
End: 2024-04-11
Payer: COMMERCIAL

## 2024-04-16 ENCOUNTER — HOSPITAL ENCOUNTER (EMERGENCY)
Facility: CLINIC | Age: 34
Discharge: HOME | End: 2024-04-17
Attending: EMERGENCY MEDICINE | Admitting: EMERGENCY MEDICINE
Payer: COMMERCIAL

## 2024-04-16 VITALS
BODY MASS INDEX: 28.32 KG/M2 | RESPIRATION RATE: 16 BRPM | OXYGEN SATURATION: 98 % | TEMPERATURE: 97.8 F | SYSTOLIC BLOOD PRESSURE: 105 MMHG | WEIGHT: 150 LBS | HEART RATE: 79 BPM | HEIGHT: 61 IN | DIASTOLIC BLOOD PRESSURE: 60 MMHG

## 2024-04-16 DIAGNOSIS — H60.312 ACUTE DIFFUSE OTITIS EXTERNA OF LEFT EAR: ICD-10-CM

## 2024-04-16 DIAGNOSIS — H66.92 LEFT ACUTE OTITIS MEDIA: ICD-10-CM

## 2024-04-16 PROCEDURE — 99284 EMERGENCY DEPT VISIT MOD MDM: CPT | Performed by: EMERGENCY MEDICINE

## 2024-04-16 PROCEDURE — 250N000013 HC RX MED GY IP 250 OP 250 PS 637: Performed by: EMERGENCY MEDICINE

## 2024-04-16 RX ORDER — OXYCODONE AND ACETAMINOPHEN 5; 325 MG/1; MG/1
1 TABLET ORAL ONCE
Status: COMPLETED | OUTPATIENT
Start: 2024-04-16 | End: 2024-04-16

## 2024-04-16 RX ADMIN — OXYCODONE HYDROCHLORIDE AND ACETAMINOPHEN 1 TABLET: 5; 325 TABLET ORAL at 23:36

## 2024-04-16 ASSESSMENT — COLUMBIA-SUICIDE SEVERITY RATING SCALE - C-SSRS
1. IN THE PAST MONTH, HAVE YOU WISHED YOU WERE DEAD OR WISHED YOU COULD GO TO SLEEP AND NOT WAKE UP?: NO
6. HAVE YOU EVER DONE ANYTHING, STARTED TO DO ANYTHING, OR PREPARED TO DO ANYTHING TO END YOUR LIFE?: NO
2. HAVE YOU ACTUALLY HAD ANY THOUGHTS OF KILLING YOURSELF IN THE PAST MONTH?: NO

## 2024-04-16 ASSESSMENT — ACTIVITIES OF DAILY LIVING (ADL): ADLS_ACUITY_SCORE: 33

## 2024-04-17 RX ORDER — OFLOXACIN 3 MG/ML
5 SOLUTION AURICULAR (OTIC) DAILY
Qty: 5 ML | Refills: 0 | Status: SHIPPED | OUTPATIENT
Start: 2024-04-17 | End: 2024-08-04
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
## 2024-04-17 NOTE — DISCHARGE INSTRUCTIONS
Return if symptoms worsen or new symptoms develop.  Follow-up with primary care physician next available.  Drink plenty of fluids.  Take ibuprofen and Tylenol for pain.  If increased fevers any ear drainage worsening pain vomiting altered mental status weakness or other symptoms present please return for further evaluation and care.

## 2024-04-17 NOTE — ED TRIAGE NOTES
Pt developed left ear pain 4 hours ago. Denies any drainage     Triage Assessment (Adult)       Row Name 04/16/24 0087          Triage Assessment    Airway WDL WDL        Respiratory WDL    Respiratory WDL WDL        Skin Circulation/Temperature WDL    Skin Circulation/Temperature WDL WDL        Cardiac WDL    Cardiac WDL WDL        Peripheral/Neurovascular WDL    Peripheral Neurovascular WDL WDL        Cognitive/Neuro/Behavioral WDL    Cognitive/Neuro/Behavioral WDL WDL

## 2024-04-18 ENCOUNTER — TELEPHONE (OUTPATIENT)
Dept: FAMILY MEDICINE | Facility: CLINIC | Age: 34
End: 2024-04-18
Payer: COMMERCIAL

## 2024-04-19 NOTE — TELEPHONE ENCOUNTER
ED / Discharge Outreach Protocol    Patient Contact    Attempt # 1    Was call answered?  No.  Left message on voicemail with information to call care team back.      MARJORIE Rosas

## 2024-07-06 ENCOUNTER — MYC REFILL (OUTPATIENT)
Dept: FAMILY MEDICINE | Facility: CLINIC | Age: 34
End: 2024-07-06
Payer: COMMERCIAL

## 2024-07-06 ENCOUNTER — MYC REFILL (OUTPATIENT)
Dept: ORTHOPEDICS | Facility: CLINIC | Age: 34
End: 2024-07-06
Payer: COMMERCIAL

## 2024-07-06 DIAGNOSIS — M54.50 ACUTE MIDLINE LOW BACK PAIN WITHOUT SCIATICA: ICD-10-CM

## 2024-07-06 DIAGNOSIS — M54.17 LUMBOSACRAL RADICULOPATHY: ICD-10-CM

## 2024-07-06 DIAGNOSIS — E26.81 BARTTER'S SYNDROME (H): ICD-10-CM

## 2024-07-06 DIAGNOSIS — M43.10 SPONDYLOLISTHESIS, GRADE 2: ICD-10-CM

## 2024-07-08 RX ORDER — POTASSIUM CHLORIDE 1500 MG/1
40 TABLET, EXTENDED RELEASE ORAL DAILY
Qty: 60 TABLET | Refills: 5 | OUTPATIENT
Start: 2024-07-08

## 2024-07-08 RX ORDER — CYCLOBENZAPRINE HCL 10 MG
5-10 TABLET ORAL 3 TIMES DAILY PRN
Qty: 20 TABLET | Refills: 0 | OUTPATIENT
Start: 2024-07-08

## 2024-07-08 NOTE — TELEPHONE ENCOUNTER
Left message for patient to call care team.     Patient requesting the following Medication:   Cyclobenzaprine 10MG  Potassium chloride 20 MEQ

## 2024-07-09 RX ORDER — GABAPENTIN 300 MG/1
300 CAPSULE ORAL 3 TIMES DAILY
Qty: 60 CAPSULE | Refills: 1 | Status: SHIPPED | OUTPATIENT
Start: 2024-07-09

## 2024-08-04 ENCOUNTER — OFFICE VISIT (OUTPATIENT)
Dept: URGENT CARE | Facility: URGENT CARE | Age: 34
End: 2024-08-04
Payer: COMMERCIAL

## 2024-08-04 ENCOUNTER — HOSPITAL ENCOUNTER (OUTPATIENT)
Facility: CLINIC | Age: 34
Setting detail: OBSERVATION
Discharge: HOME | End: 2024-08-04
Attending: EMERGENCY MEDICINE | Admitting: STUDENT IN AN ORGANIZED HEALTH CARE EDUCATION/TRAINING PROGRAM
Payer: COMMERCIAL

## 2024-08-04 VITALS
HEIGHT: 61 IN | HEART RATE: 64 BPM | RESPIRATION RATE: 14 BRPM | DIASTOLIC BLOOD PRESSURE: 38 MMHG | BODY MASS INDEX: 31.3 KG/M2 | TEMPERATURE: 97.8 F | OXYGEN SATURATION: 97 % | SYSTOLIC BLOOD PRESSURE: 102 MMHG | WEIGHT: 165.79 LBS

## 2024-08-04 VITALS
OXYGEN SATURATION: 98 % | TEMPERATURE: 98.3 F | BODY MASS INDEX: 31.53 KG/M2 | HEIGHT: 61 IN | SYSTOLIC BLOOD PRESSURE: 112 MMHG | WEIGHT: 167 LBS | DIASTOLIC BLOOD PRESSURE: 69 MMHG | HEART RATE: 82 BPM

## 2024-08-04 DIAGNOSIS — E87.6 HYPOKALEMIA: ICD-10-CM

## 2024-08-04 DIAGNOSIS — R20.2 PARESTHESIAS: ICD-10-CM

## 2024-08-04 DIAGNOSIS — R20.0 BILATERAL HAND NUMBNESS: Primary | ICD-10-CM

## 2024-08-04 PROBLEM — E26.81: Status: ACTIVE | Noted: 2024-08-04

## 2024-08-04 PROBLEM — N15.8 GITELMAN SYNDROME: Status: ACTIVE | Noted: 2024-08-04

## 2024-08-04 LAB
ALBUMIN UR-MCNC: NEGATIVE MG/DL
AMPHETAMINES UR QL SCN: ABNORMAL
ANION GAP SERPL CALCULATED.3IONS-SCNC: 12 MMOL/L (ref 7–15)
APPEARANCE UR: CLEAR
BACTERIA #/AREA URNS HPF: ABNORMAL /HPF
BARBITURATES UR QL SCN: ABNORMAL
BASOPHILS # BLD AUTO: 0.1 10E3/UL (ref 0–0.2)
BASOPHILS NFR BLD AUTO: 1 %
BENZODIAZ UR QL SCN: ABNORMAL
BILIRUB UR QL STRIP: NEGATIVE
BUN SERPL-MCNC: 13.4 MG/DL (ref 6–20)
BZE UR QL SCN: ABNORMAL
CALCIUM SERPL-MCNC: 9.4 MG/DL (ref 8.8–10.4)
CANNABINOIDS UR QL SCN: ABNORMAL
CHLORIDE SERPL-SCNC: 96 MMOL/L (ref 98–107)
COLOR UR AUTO: ABNORMAL
CREAT SERPL-MCNC: 0.71 MG/DL (ref 0.51–0.95)
EGFRCR SERPLBLD CKD-EPI 2021: >90 ML/MIN/1.73M2
EOSINOPHIL # BLD AUTO: 0.2 10E3/UL (ref 0–0.7)
EOSINOPHIL NFR BLD AUTO: 2 %
ERYTHROCYTE [DISTWIDTH] IN BLOOD BY AUTOMATED COUNT: 13.2 % (ref 10–15)
FENTANYL UR QL: ABNORMAL
GLUCOSE SERPL-MCNC: 97 MG/DL (ref 70–99)
GLUCOSE UR STRIP-MCNC: NEGATIVE MG/DL
HCO3 SERPL-SCNC: 30 MMOL/L (ref 22–29)
HCT VFR BLD AUTO: 41.1 % (ref 35–47)
HGB BLD-MCNC: 14.5 G/DL (ref 11.7–15.7)
HGB UR QL STRIP: NEGATIVE
IMM GRANULOCYTES # BLD: 0 10E3/UL
IMM GRANULOCYTES NFR BLD: 0 %
KETONES UR STRIP-MCNC: NEGATIVE MG/DL
LEUKOCYTE ESTERASE UR QL STRIP: NEGATIVE
LYMPHOCYTES # BLD AUTO: 2.9 10E3/UL (ref 0.8–5.3)
LYMPHOCYTES NFR BLD AUTO: 31 %
MAGNESIUM SERPL-MCNC: 2 MG/DL (ref 1.7–2.3)
MCH RBC QN AUTO: 30.9 PG (ref 26.5–33)
MCHC RBC AUTO-ENTMCNC: 35.3 G/DL (ref 31.5–36.5)
MCV RBC AUTO: 88 FL (ref 78–100)
MONOCYTES # BLD AUTO: 0.5 10E3/UL (ref 0–1.3)
MONOCYTES NFR BLD AUTO: 6 %
NEUTROPHILS # BLD AUTO: 5.6 10E3/UL (ref 1.6–8.3)
NEUTROPHILS NFR BLD AUTO: 60 %
NITRATE UR QL: NEGATIVE
NRBC # BLD AUTO: 0 10E3/UL
NRBC BLD AUTO-RTO: 0 /100
OPIATES UR QL SCN: ABNORMAL
PCP QUAL URINE (ROCHE): ABNORMAL
PH UR STRIP: 7 [PH] (ref 5–7)
PLATELET # BLD AUTO: 273 10E3/UL (ref 150–450)
POTASSIUM SERPL-SCNC: 2.5 MMOL/L (ref 3.4–5.3)
POTASSIUM SERPL-SCNC: 2.6 MMOL/L (ref 3.4–5.3)
POTASSIUM SERPL-SCNC: 2.9 MMOL/L (ref 3.4–5.3)
RBC # BLD AUTO: 4.69 10E6/UL (ref 3.8–5.2)
RBC URINE: 0 /HPF
SODIUM SERPL-SCNC: 138 MMOL/L (ref 135–145)
SP GR UR STRIP: 1.01 (ref 1–1.03)
SQUAMOUS EPITHELIAL: 1 /HPF
TRANSITIONAL EPI: <1 /HPF
UROBILINOGEN UR STRIP-MCNC: NORMAL MG/DL
WBC # BLD AUTO: 9.3 10E3/UL (ref 4–11)
WBC URINE: <1 /HPF

## 2024-08-04 PROCEDURE — G0378 HOSPITAL OBSERVATION PER HR: HCPCS

## 2024-08-04 PROCEDURE — 250N000013 HC RX MED GY IP 250 OP 250 PS 637: Performed by: STUDENT IN AN ORGANIZED HEALTH CARE EDUCATION/TRAINING PROGRAM

## 2024-08-04 PROCEDURE — 80048 BASIC METABOLIC PNL TOTAL CA: CPT | Performed by: EMERGENCY MEDICINE

## 2024-08-04 PROCEDURE — 99285 EMERGENCY DEPT VISIT HI MDM: CPT | Performed by: EMERGENCY MEDICINE

## 2024-08-04 PROCEDURE — 99207 PR INPT ADMISSION FROM CLINIC: CPT | Performed by: EMERGENCY MEDICINE

## 2024-08-04 PROCEDURE — 83735 ASSAY OF MAGNESIUM: CPT | Performed by: EMERGENCY MEDICINE

## 2024-08-04 PROCEDURE — 36415 COLL VENOUS BLD VENIPUNCTURE: CPT | Performed by: EMERGENCY MEDICINE

## 2024-08-04 PROCEDURE — 250N000013 HC RX MED GY IP 250 OP 250 PS 637: Performed by: EMERGENCY MEDICINE

## 2024-08-04 PROCEDURE — 85025 COMPLETE CBC W/AUTO DIFF WBC: CPT | Performed by: EMERGENCY MEDICINE

## 2024-08-04 PROCEDURE — 99207 PR APP CREDIT; MD BILLING SHARED VISIT: CPT | Mod: FS

## 2024-08-04 PROCEDURE — 93005 ELECTROCARDIOGRAM TRACING: CPT | Performed by: EMERGENCY MEDICINE

## 2024-08-04 PROCEDURE — 96360 HYDRATION IV INFUSION INIT: CPT | Performed by: EMERGENCY MEDICINE

## 2024-08-04 PROCEDURE — 84132 ASSAY OF SERUM POTASSIUM: CPT | Performed by: EMERGENCY MEDICINE

## 2024-08-04 PROCEDURE — 81003 URINALYSIS AUTO W/O SCOPE: CPT | Performed by: EMERGENCY MEDICINE

## 2024-08-04 PROCEDURE — 80307 DRUG TEST PRSMV CHEM ANLYZR: CPT | Performed by: STUDENT IN AN ORGANIZED HEALTH CARE EDUCATION/TRAINING PROGRAM

## 2024-08-04 PROCEDURE — 99235 HOSP IP/OBS SAME DATE MOD 70: CPT | Performed by: STUDENT IN AN ORGANIZED HEALTH CARE EDUCATION/TRAINING PROGRAM

## 2024-08-04 PROCEDURE — 93010 ELECTROCARDIOGRAM REPORT: CPT | Performed by: EMERGENCY MEDICINE

## 2024-08-04 PROCEDURE — 96361 HYDRATE IV INFUSION ADD-ON: CPT | Performed by: EMERGENCY MEDICINE

## 2024-08-04 PROCEDURE — 84132 ASSAY OF SERUM POTASSIUM: CPT | Mod: 91 | Performed by: STUDENT IN AN ORGANIZED HEALTH CARE EDUCATION/TRAINING PROGRAM

## 2024-08-04 PROCEDURE — 36415 COLL VENOUS BLD VENIPUNCTURE: CPT | Performed by: STUDENT IN AN ORGANIZED HEALTH CARE EDUCATION/TRAINING PROGRAM

## 2024-08-04 PROCEDURE — 258N000003 HC RX IP 258 OP 636: Performed by: STUDENT IN AN ORGANIZED HEALTH CARE EDUCATION/TRAINING PROGRAM

## 2024-08-04 PROCEDURE — 258N000003 HC RX IP 258 OP 636: Performed by: EMERGENCY MEDICINE

## 2024-08-04 RX ORDER — POTASSIUM CHLORIDE 1500 MG/1
20 TABLET, EXTENDED RELEASE ORAL ONCE
Status: DISCONTINUED | OUTPATIENT
Start: 2024-08-04 | End: 2024-08-04

## 2024-08-04 RX ORDER — ACETAMINOPHEN 325 MG/1
650 TABLET ORAL EVERY 4 HOURS PRN
Status: DISCONTINUED | OUTPATIENT
Start: 2024-08-04 | End: 2024-08-04

## 2024-08-04 RX ORDER — ONDANSETRON 2 MG/ML
4 INJECTION INTRAMUSCULAR; INTRAVENOUS EVERY 6 HOURS PRN
Status: DISCONTINUED | OUTPATIENT
Start: 2024-08-04 | End: 2024-08-04 | Stop reason: HOSPADM

## 2024-08-04 RX ORDER — CALCIUM CARBONATE 500 MG/1
1000 TABLET, CHEWABLE ORAL 4 TIMES DAILY PRN
Status: DISCONTINUED | OUTPATIENT
Start: 2024-08-04 | End: 2024-08-04 | Stop reason: HOSPADM

## 2024-08-04 RX ORDER — POTASSIUM CHLORIDE 1500 MG/1
40 TABLET, EXTENDED RELEASE ORAL ONCE
Status: COMPLETED | OUTPATIENT
Start: 2024-08-04 | End: 2024-08-04

## 2024-08-04 RX ORDER — POTASSIUM CHLORIDE 1.5 G/1.58G
20 POWDER, FOR SOLUTION ORAL ONCE
Status: DISCONTINUED | OUTPATIENT
Start: 2024-08-04 | End: 2024-08-04 | Stop reason: HOSPADM

## 2024-08-04 RX ORDER — ONDANSETRON 2 MG/ML
4 INJECTION INTRAMUSCULAR; INTRAVENOUS EVERY 6 HOURS PRN
Status: DISCONTINUED | OUTPATIENT
Start: 2024-08-04 | End: 2024-08-04

## 2024-08-04 RX ORDER — AMILORIDE HYDROCHLORIDE 5 MG/1
5 TABLET ORAL DAILY
Status: DISCONTINUED | OUTPATIENT
Start: 2024-08-04 | End: 2024-08-04 | Stop reason: HOSPADM

## 2024-08-04 RX ORDER — SODIUM CHLORIDE, SODIUM LACTATE, POTASSIUM CHLORIDE, CALCIUM CHLORIDE 600; 310; 30; 20 MG/100ML; MG/100ML; MG/100ML; MG/100ML
INJECTION, SOLUTION INTRAVENOUS CONTINUOUS
Status: DISCONTINUED | OUTPATIENT
Start: 2024-08-04 | End: 2024-08-04 | Stop reason: HOSPADM

## 2024-08-04 RX ORDER — AMOXICILLIN 250 MG
2 CAPSULE ORAL 2 TIMES DAILY PRN
Status: DISCONTINUED | OUTPATIENT
Start: 2024-08-04 | End: 2024-08-04 | Stop reason: HOSPADM

## 2024-08-04 RX ORDER — IBUPROFEN 600 MG/1
600 TABLET, FILM COATED ORAL EVERY 6 HOURS PRN
Status: DISCONTINUED | OUTPATIENT
Start: 2024-08-04 | End: 2024-08-04 | Stop reason: HOSPADM

## 2024-08-04 RX ORDER — ACETAMINOPHEN 325 MG/1
650 TABLET ORAL EVERY 4 HOURS PRN
Status: DISCONTINUED | OUTPATIENT
Start: 2024-08-04 | End: 2024-08-04 | Stop reason: HOSPADM

## 2024-08-04 RX ORDER — SODIUM CHLORIDE 9 MG/ML
INJECTION, SOLUTION INTRAVENOUS CONTINUOUS
Status: DISCONTINUED | OUTPATIENT
Start: 2024-08-04 | End: 2024-08-04

## 2024-08-04 RX ORDER — AMOXICILLIN 250 MG
1 CAPSULE ORAL 2 TIMES DAILY PRN
Status: DISCONTINUED | OUTPATIENT
Start: 2024-08-04 | End: 2024-08-04 | Stop reason: HOSPADM

## 2024-08-04 RX ORDER — ONDANSETRON 4 MG/1
4 TABLET, ORALLY DISINTEGRATING ORAL EVERY 6 HOURS PRN
Status: DISCONTINUED | OUTPATIENT
Start: 2024-08-04 | End: 2024-08-04 | Stop reason: HOSPADM

## 2024-08-04 RX ORDER — POTASSIUM CHLORIDE 1.5 G/1.58G
40 POWDER, FOR SOLUTION ORAL ONCE
Status: COMPLETED | OUTPATIENT
Start: 2024-08-04 | End: 2024-08-04

## 2024-08-04 RX ORDER — ONDANSETRON 4 MG/1
4 TABLET, ORALLY DISINTEGRATING ORAL EVERY 6 HOURS PRN
Status: DISCONTINUED | OUTPATIENT
Start: 2024-08-04 | End: 2024-08-04

## 2024-08-04 RX ADMIN — POTASSIUM CHLORIDE 40 MEQ: 1.5 POWDER, FOR SOLUTION ORAL at 18:22

## 2024-08-04 RX ADMIN — ACETAMINOPHEN 650 MG: 325 TABLET, FILM COATED ORAL at 15:28

## 2024-08-04 RX ADMIN — SODIUM CHLORIDE 500 ML: 9 INJECTION, SOLUTION INTRAVENOUS at 12:16

## 2024-08-04 RX ADMIN — SODIUM CHLORIDE, POTASSIUM CHLORIDE, SODIUM LACTATE AND CALCIUM CHLORIDE: 600; 310; 30; 20 INJECTION, SOLUTION INTRAVENOUS at 16:11

## 2024-08-04 RX ADMIN — POTASSIUM CHLORIDE 40 MEQ: 1500 TABLET, EXTENDED RELEASE ORAL at 15:28

## 2024-08-04 RX ADMIN — POTASSIUM CHLORIDE 40 MEQ: 1500 TABLET, EXTENDED RELEASE ORAL at 13:51

## 2024-08-04 RX ADMIN — AMILORIDE HYDROCHLORIDE 5 MG: 5 TABLET ORAL at 17:09

## 2024-08-04 ASSESSMENT — ACTIVITIES OF DAILY LIVING (ADL)
ADLS_ACUITY_SCORE: 35
ADLS_ACUITY_SCORE: 20
ADLS_ACUITY_SCORE: 35
ADLS_ACUITY_SCORE: 20
ADLS_ACUITY_SCORE: 20
ADLS_ACUITY_SCORE: 35

## 2024-08-04 ASSESSMENT — COLUMBIA-SUICIDE SEVERITY RATING SCALE - C-SSRS
1. IN THE PAST MONTH, HAVE YOU WISHED YOU WERE DEAD OR WISHED YOU COULD GO TO SLEEP AND NOT WAKE UP?: NO
2. HAVE YOU ACTUALLY HAD ANY THOUGHTS OF KILLING YOURSELF IN THE PAST MONTH?: NO
6. HAVE YOU EVER DONE ANYTHING, STARTED TO DO ANYTHING, OR PREPARED TO DO ANYTHING TO END YOUR LIFE?: NO

## 2024-08-04 NOTE — PLAN OF CARE
"[id number]    At 1730 K+ came back at 2.9 up from 2.5. Ran the protocol and she was replaced at 1800 and needs a follow up dose at 200- then recheck around 0130.      Neuro: WNL. Sensation changes and mild weakness to BUE unchanged so far.      Cardiac: T max 98.3. Placed on monitor- Strips printed- showing NSR 60-60's. SBP's .  BP (!) 102/38 (BP Location: Left arm)   Pulse 64   Temp 97.8  F (36.6  C) (Oral)   Resp 14   Ht 1.549 m (5' 1\")   Wt 75.2 kg (165 lb 12.6 oz)   SpO2 97%   BMI 31.32 kg/m         Resp: WNL on RA. Denies any issues.      GI/: WNL. Eating and drinking well. No nausea with meds. Denies constipation, GI upset, blood etc.      MSK: Weakness in her BUE.      Skin: WNL     LDAs: PIV CDI     "
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
## 2024-08-04 NOTE — ED TRIAGE NOTES
Numbness in bilateral hands since Wednesday, pain in bilateral arms when trying to move boxes at work and burning feeling during the night.      Triage Assessment (Adult)       Row Name 08/04/24 1051          Triage Assessment    Airway WDL WDL        Respiratory WDL    Respiratory WDL WDL        Skin Circulation/Temperature WDL    Skin Circulation/Temperature WDL WDL

## 2024-08-04 NOTE — MEDICATION SCRIBE - ADMISSION MEDICATION HISTORY
Medication Scribe Admission Medication History    Admission medication history is complete. The information provided in this note is only as accurate as the sources available at the time of the update.    Information Source(s): Patient via in-person    Pertinent Information: pt is not taking potassium everyday because she needs a clinic visit before she can get it refilled    Changes made to PTA medication list:  Added: None  Deleted: None  Changed: None    Allergies reviewed with patient and updates made in EHR: yes    Medication History Completed By: Valentina Raman 8/4/2024 1:57 PM    PTA Med List   Medication Sig Note Last Dose    acetaminophen (TYLENOL) 325 MG tablet Take 325-650 mg by mouth every 6 hours as needed for mild pain  Past Week at unknown    albuterol (PROAIR HFA/PROVENTIL HFA/VENTOLIN HFA) 108 (90 Base) MCG/ACT inhaler Inhale 2 puffs into the lungs every 4 hours as needed for shortness of breath, wheezing or cough  Past Month at unknown    gabapentin (NEURONTIN) 300 MG capsule Take 1 capsule (300 mg) by mouth 3 times daily 8/4/2024: Pt takes PRN Past Week at unknown    potassium chloride ER (KLOR-CON M) 20 MEQ CR tablet Take 2 tablets (40 mEq) by mouth daily 8/4/2024: Pt is taking sparingly. Needs a refill, but needs a clinic visit.  8/3/2024 at

## 2024-08-04 NOTE — PROGRESS NOTES
"[place] [facility] [place]    Patient admitted to room 2311 at approximately 1510 via wheel chair from emergency room. Patient was accompanied by transport tech.     Verbal SBAR report received from Devin AMADOR prior to patient arrival.     Patient ambulated to bed independently. Patient alert and oriented X 3. Pain is controlled with current analgesics.  Medication(s) being used: none.  . Admission vital signs: Blood pressure 99/58, pulse 64, temperature 97.9  F (36.6  C), temperature source Oral, resp. rate 18, height 1.575 m (5' 2\"), weight 75.8 kg (167 lb), SpO2 98%, not currently breastfeeding. Patient was oriented to plan of care, call light, bed controls, tv, telephone, bathroom, and visiting hours.     Risk Assessment    The following safety risks were identified during admission: none. Yellow risk band applied: NO.     Skin Initial Assessment    This writer admitted this patient and completed a full skin assessment and Praveen score in the Adult PCS flowsheet.   Photo documentation of skin problem and/or wound competed via [employer] application (located under Media):  N/A    Appropriate interventions initiated as needed.     Secondary skin check completed by Refused.         Education    Patient has a Sweet to Observation order: Yes  Observation education completed and documented: Yes      Suzanne Wadsworth RN      "
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
## 2024-08-04 NOTE — PROGRESS NOTES
CHIEF COMPLAINT: Numbness and weakness arms and hands      HPI: Patient is a 33-year-old female who presents with a 2-day history of numbness in her arms especially forearms and fingers.  It includes all her fingers and thumb.  She has no similar sensation in her legs or feet.  She has no neck pain.  She has no history of cervical disc disorder.    Patient has a history of Bartters Syndrome and Gitelman Syndrome which by the record can cause profound hypokalemia, hypomagnesemia, and hypocalcemia.  She is post to be taking high-dose potassium every day which she has not been taking.  She sporadically took potassium from her sisters prescription but she has not had any supplement in at least 2 months.      ROS: See HPI otherwise normal.    Allergies   Allergen Reactions    Keflex [Cephalexin Monohydrate] Hives and Rash      Current Outpatient Medications   Medication Sig Dispense Refill    acetaminophen (TYLENOL) 325 MG tablet Take 325-650 mg by mouth every 6 hours as needed for mild pain      albuterol (PROAIR HFA/PROVENTIL HFA/VENTOLIN HFA) 108 (90 Base) MCG/ACT inhaler Inhale 2 puffs into the lungs every 4 hours as needed for shortness of breath, wheezing or cough 18 g 0    gabapentin (NEURONTIN) 300 MG capsule Take 1 capsule (300 mg) by mouth 3 times daily 60 capsule 1    amoxicillin-clavulanate (AUGMENTIN) 875-125 MG tablet Take 1 tablet by mouth 2 times daily (Patient not taking: Reported on 8/4/2024) 20 tablet 0    cyclobenzaprine (FLEXERIL) 10 MG tablet Take 0.5-1 tablets (5-10 mg) by mouth 3 times daily as needed for muscle spasms (Patient not taking: Reported on 8/4/2024) 20 tablet 0    meloxicam (MOBIC) 15 MG tablet Take 1 tablet (15 mg) by mouth daily With food (Patient not taking: Reported on 8/4/2024) 15 tablet 0    ofloxacin (FLOXIN) 0.3 % otic solution Place 5 drops Into the left ear daily 5 mL 0    potassium chloride ER (KLOR-CON M) 20 MEQ CR tablet Take 2 tablets (40 mEq) by mouth daily (Patient not  taking: Reported on 8/4/2024) 60 tablet 5         PE: No acute distress.  Blood pressure 112/69.  Vital signs otherwise normal.  HEENT reveals cranial nerves to be grossly intact.  Upper extremities reveals some decreased hand grasp.  Reflexes appear to be symmetrical, possibly slightly reduced at the right biceps.  She describes decreased sensation in involving the palmar and dorsal aspect of her hands and fingers on examination.  Pulses are intact.        TREATMENT: None      ASSESSMENT: Bilateral neurologic symptoms upper extremities.  May represent cervical disc disease but I am concerned about metabolic disorder as described above with her syndromes.  Feel patient needs to be seen for more sophisticated testing and sent her to the emergency department at this time.      DIAGNOSIS: Numbness arms and hands.      PLAN: Patient sent to [place] emergency department.  Report given.                Up to Date:   Bartter syndrome and Gitelman syndrome (also called tubular hypomagnesemia-hypokalemia with hypocalciuria) are autosomal recessive disorders with characteristic sets of metabolic abnormalities [1-5]. These include hypokalemia, metabolic alkalosis, hyperreninemia, hyperplasia of the juxtaglomerular apparatus (the source of renin in the kidney), and hyperaldosteronism

## 2024-08-05 NOTE — PROGRESS NOTES
[facility] DISCHARGE NOTE    Patient discharged to home at 8:16 PM Against Medical Advice via ambulation. Accompanied by spouse and son and staff. Discharge instructions reviewed with other, opportunity offered to ask questions. Prescriptions - None ordered for discharge. All belongings sent with patient. AMA paper signed and witnessed.    Karen M Devick, RN

## 2024-08-21 ENCOUNTER — MYC REFILL (OUTPATIENT)
Dept: FAMILY MEDICINE | Facility: CLINIC | Age: 34
End: 2024-08-21
Payer: COMMERCIAL

## 2024-08-21 DIAGNOSIS — E26.81 BARTTER'S SYNDROME (H): ICD-10-CM

## 2024-08-23 RX ORDER — POTASSIUM CHLORIDE 1500 MG/1
40 TABLET, EXTENDED RELEASE ORAL DAILY
Qty: 30 TABLET | Refills: 0 | Status: SHIPPED | OUTPATIENT
Start: 2024-08-23

## 2024-08-28 ENCOUNTER — OFFICE VISIT (OUTPATIENT)
Dept: FAMILY MEDICINE | Facility: CLINIC | Age: 34
End: 2024-08-28
Payer: COMMERCIAL

## 2024-08-28 VITALS
TEMPERATURE: 97.5 F | BODY MASS INDEX: 31.53 KG/M2 | HEIGHT: 61 IN | SYSTOLIC BLOOD PRESSURE: 116 MMHG | OXYGEN SATURATION: 100 % | RESPIRATION RATE: 20 BRPM | DIASTOLIC BLOOD PRESSURE: 72 MMHG | HEART RATE: 96 BPM | WEIGHT: 167 LBS
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
## 2024-08-28 DIAGNOSIS — T19.2XXA RETAINED TAMPON NOT FOUND ON EXAMINATION: Primary | ICD-10-CM

## 2024-08-28 PROCEDURE — 99213 OFFICE O/P EST LOW 20 MIN: CPT | Performed by: NURSE PRACTITIONER

## 2024-08-28 ASSESSMENT — PAIN SCALES - GENERAL: PAINLEVEL: NO PAIN (0)

## 2024-08-28 NOTE — PROGRESS NOTES
"  Assessment & Plan     Retained tampon not found on examination  Mora presents today for concern of possible retained tampon.  She reports that she is forgetful and often does not remember if she removed her tampon or not.  No tampon found on pelvic exam, no odor or discharge  Will plan to use pads due to recurrent problems with tampons.      Subjective   Mora is a 33 year old, presenting for the following health issues:  Vaginal Problem (Unsure if pulled out tampon before having intercourse with boyfriend so would like to have checked out. )        8/28/2024     3:30 PM   Additional Questions   Roomed by Sana RIOJAS   Accompanied by self         8/28/2024     3:30 PM   Patient Reported Additional Medications   Patient reports taking the following new medications no new meds     HPI     Vaginal Symptoms  Onset/Duration: about 3 days   Description:  Vaginal Discharge: blood unsure    Itching (Pruritis): YES  Burning sensation:  No  Odor: YES  Accompanying Signs & Symptoms:  Urinary symptoms: YES- urinary- feeling like unable to empty bladder completely   Abdominal pain: YES- day after   Fever: No  History:   Sexually active: YES  New Partner: No  Possibility of Pregnancy:  No  Recent antibiotic use: No  Previous vaginitis issues: YES  Precipitating or alleviating factors: None  Therapies tried and outcome: tried reaching around and tired tylenol for slight pain         Review of Systems  Constitutional, HEENT, cardiovascular, pulmonary, gi and gu systems are negative, except as otherwise noted.      Objective    /72   Pulse 96   Temp 97.5  F (36.4  C) (Tympanic)   Resp 20   Ht 1.549 m (5' 1\")   Wt 75.8 kg (167 lb)   LMP 08/18/2024 (Approximate)   SpO2 100%   BMI 31.55 kg/m    Body mass index is 31.55 kg/m .  Physical Exam   GENERAL: alert and no distress   (female): normal female external genitalia, normal urethral meatus, normal vaginal mucosa  PSYCH: mentation appears normal, affect " normal/bright          Signed Electronically by: ADELAIDE Hinojosa CNP

## 2024-08-31 ENCOUNTER — HEALTH MAINTENANCE LETTER (OUTPATIENT)
Age: 34
End: 2024-08-31

## 2024-09-05 PROBLEM — Z30.432 ENCOUNTER FOR REMOVAL OF INTRAUTERINE CONTRACEPTIVE DEVICE (IUD): Status: RESOLVED | Noted: 2021-04-12 | Resolved: 2024-09-05

## 2024-09-05 PROBLEM — E87.6 HYPOKALEMIA: Status: RESOLVED | Noted: 2024-08-04 | Resolved: 2024-09-05

## 2024-09-05 PROBLEM — T83.32XA IUD MIGRATION, INITIAL ENCOUNTER: Status: RESOLVED | Noted: 2021-03-11 | Resolved: 2024-09-05

## 2024-10-28 ENCOUNTER — MYC REFILL (OUTPATIENT)
Dept: ORTHOPEDICS | Facility: CLINIC | Age: 34
End: 2024-10-28
Payer: COMMERCIAL

## 2024-10-28 ENCOUNTER — MYC REFILL (OUTPATIENT)
Dept: FAMILY MEDICINE | Facility: CLINIC | Age: 34
End: 2024-10-28
Payer: COMMERCIAL

## 2024-10-28 DIAGNOSIS — M54.17 LUMBOSACRAL RADICULOPATHY: ICD-10-CM

## 2024-10-28 DIAGNOSIS — M54.50 ACUTE MIDLINE LOW BACK PAIN WITHOUT SCIATICA: ICD-10-CM

## 2024-10-28 DIAGNOSIS — M43.10 SPONDYLOLISTHESIS, GRADE 2: ICD-10-CM

## 2024-10-28 DIAGNOSIS — E26.81 BARTTER'S SYNDROME (H): ICD-10-CM

## 2024-10-28 RX ORDER — GABAPENTIN 300 MG/1
300 CAPSULE ORAL 3 TIMES DAILY
Qty: 60 CAPSULE | Refills: 1 | OUTPATIENT
Start: 2024-10-28

## 2024-10-30 RX ORDER — POTASSIUM CHLORIDE 1500 MG/1
40 TABLET, EXTENDED RELEASE ORAL DAILY
Qty: 30 TABLET | Refills: 0 | OUTPATIENT
Start: 2024-10-30

## 2024-10-30 RX ORDER — GABAPENTIN 300 MG/1
300 CAPSULE ORAL 3 TIMES DAILY
Qty: 60 CAPSULE | Refills: 1 | Status: SHIPPED | OUTPATIENT
Start: 2024-10-30

## 2024-10-30 NOTE — TELEPHONE ENCOUNTER
Requested Prescriptions   Pending Prescriptions Disp Refills    potassium chloride rina ER (KLOR-CON M20) 20 MEQ CR tablet 30 tablet 0     Sig: Take 2 tablets (40 mEq) by mouth daily. Due for recheck in clinic       Potassium Supplements Protocol Failed - 10/30/2024  8:35 AM        Failed - Normal serum potassium in past 12 months     Recent Labs   Lab Test 08/04/24  1724   POTASSIUM 2.9*                    Passed - Medication is active on med list        Passed - Medication indicated for associated diagnosis     Potassium is associated with one of the following diagnoses:    Hypokalemia    Hypokalemia prophylaxis   Hypertension   Heart failure   Edema            Passed - Recent (12 mo) or future (90 days) visit within the authorizing provider's department     The patient must have completed an in-person or virtual visit within the past 12 months or has a future visit scheduled within the next 90 days with the authorizing provider s specialty.  Urgent care and e-visits do not quality as an office visit for this protocol.          Passed - Patient is age 18 or older

## 2024-11-05 ENCOUNTER — VIRTUAL VISIT (OUTPATIENT)
Dept: URGENT CARE | Facility: CLINIC | Age: 34
End: 2024-11-05
Payer: COMMERCIAL

## 2024-11-05 DIAGNOSIS — N30.00 ACUTE CYSTITIS WITHOUT HEMATURIA: Primary | ICD-10-CM

## 2024-11-05 PROCEDURE — 99213 OFFICE O/P EST LOW 20 MIN: CPT | Mod: 95

## 2024-11-05 RX ORDER — NITROFURANTOIN 25; 75 MG/1; MG/1
100 CAPSULE ORAL 2 TIMES DAILY
Qty: 14 CAPSULE | Refills: 0 | Status: SHIPPED | OUTPATIENT
Start: 2024-11-05 | End: 2024-11-11

## 2024-11-05 NOTE — PROGRESS NOTES
Subjective   HPI    Not pregnant or breastfeeding  The past 2 days has been having urgency and frequency   Now when she wipes slightly pinkish urine one time  Urine is clear     No fevers vomiting back pain        Patient Active Problem List   Diagnosis    Bipolar disorder (H)    Constipation    Bartter's syndrome    TOBACCO USE DISORDER-quit    CTS (carpal tunnel syndrome)    GERD (gastroesophageal reflux disease)    Rh negative, antepartum    Cervical high risk HPV (human papillomavirus) test positive    Encounter for sterilization    Status post bilateral salpingectomy    Bartter's syndrome (H)    Acute midline low back pain without sciatica    Spondylolisthesis, grade 2    Lumbosacral radiculopathy    Paresthesias    Chronic hypokalemia    Gitelman syndrome    Bartter syndrome (H)     Past Surgical History:   Procedure Laterality Date    DILATION AND CURETTAGE SUCTION WITH ULTRASOUND GUIDANCE N/A 3/26/2019    Procedure: PRUDENCIO CURETTAGE WITH REMOVAL OF PLACENTA UNDER ULTRASOUND GUIDANCE;  Surgeon: Savi Smart MD;  Location: [facility]    LAPAROSCOPIC SALPINGECTOMY Bilateral 4/12/2021    Procedure: SALPINGECTOMY, LAPAROSCOPIC LAPAROSCOPIC REMOVAL OF DISPLACED INTRAUTERINE DEVICE.;  Surgeon: Isabel Sherwood DO;  Location: [facility] OR    TOE SURGERY      removal of glass       Social History     Tobacco Use    Smoking status: Every Day     Current packs/day: 0.50     Average packs/day: 0.5 packs/day for 6.0 years (3.0 ttl pk-yrs)     Types: Cigarettes    Smokeless tobacco: Never   Substance Use Topics    Alcohol use: No     Alcohol/week: 0.0 standard drinks of alcohol     Family History   Problem Relation Age of Onset    Diabetes Mother     Thyroid Disease Mother     Depression Mother     Hypertension Paternal Grandmother     Cancer - colorectal Paternal Grandmother         colon    Cancer Paternal Grandfather         throat cancer, not smoker    Genetic Disorder Sister         Bartter Syndrome           Reviewed  and updated as needed this visit by Provider                   Review of Systems   Constitutional, HEENT, cardiovascular, pulmonary, gi and gu systems are negative, except as otherwise noted.       Objective   Reported vitals:  There were no vitals taken for this visit.   healthy, alert, and no distress  PSYCH: Alert and oriented times 3; coherent speech, normal   rate and volume, able to articulate logical thoughts, able   to abstract reason, no tangential thoughts, no hallucinations   or delusions  Her affect is normal  RESP: No cough, no audible wheezing, able to talk in full sentences  Additional exam:  none  Remainder of exam unable to be completed due to telephone visits    Diagnostic Test Results:  Labs reviewed in Epic        Assessment/Plan:      ICD-10-CM    1. Acute cystitis without hematuria  N30.00 nitroFURantoin macrocrystal-monohydrate (MACROBID) 100 MG capsule        Prescribed with macrobid  We discussed that based on her risk factors best to get a urinalysis before starting the antibiotic however patient declined.  She is unable and caring for 4 children and worried this will cause delay of her starting treatment . We discussed and I offered ways to avoid such delay however patient declined at this time.  Aware to come back if worsening symptoms or no relief. Come in asap if with fevers nausea vomiting or flank pain.      Joined the call at 11/5/2024, 5:33:51 pm.  Left the call at 11/5/2024, 5:42:00 pm.  You were on the call for 8 minutes 8 seconds   Amwell  Patient home   Provider home   Snow Wu MD

## 2024-11-09 ENCOUNTER — OFFICE VISIT (OUTPATIENT)
Dept: URGENT CARE | Facility: URGENT CARE | Age: 34
End: 2024-11-09
Payer: COMMERCIAL

## 2024-11-09 ENCOUNTER — NURSE TRIAGE (OUTPATIENT)
Dept: NURSING | Facility: CLINIC | Age: 34
End: 2024-11-09
Payer: COMMERCIAL

## 2024-11-09 VITALS
HEART RATE: 76 BPM | TEMPERATURE: 98.1 F | WEIGHT: 169 LBS | RESPIRATION RATE: 16 BRPM | BODY MASS INDEX: 31.93 KG/M2 | OXYGEN SATURATION: 100 % | SYSTOLIC BLOOD PRESSURE: 119 MMHG | DIASTOLIC BLOOD PRESSURE: 75 MMHG

## 2024-11-09 DIAGNOSIS — N39.0 ACUTE UTI (URINARY TRACT INFECTION): Primary | ICD-10-CM

## 2024-11-09 DIAGNOSIS — R30.0 DYSURIA: ICD-10-CM

## 2024-11-09 LAB
ALBUMIN UR-MCNC: ABNORMAL MG/DL
APPEARANCE UR: CLEAR
BACTERIA #/AREA URNS HPF: ABNORMAL /HPF
BILIRUB UR QL STRIP: NEGATIVE
CLUE CELLS: ABNORMAL
COLOR UR AUTO: ABNORMAL
GLUCOSE UR STRIP-MCNC: NEGATIVE MG/DL
HGB UR QL STRIP: ABNORMAL
KETONES UR STRIP-MCNC: NEGATIVE MG/DL
LEUKOCYTE ESTERASE UR QL STRIP: ABNORMAL
NITRATE UR QL: NEGATIVE
PH UR STRIP: 8.5 [PH] (ref 5–7)
RBC #/AREA URNS AUTO: ABNORMAL /HPF
SP GR UR STRIP: 1.01 (ref 1–1.03)
SQUAMOUS #/AREA URNS AUTO: ABNORMAL /LPF
TRICHOMONAS, WET PREP: ABNORMAL
UROBILINOGEN UR STRIP-ACNC: 0.2 E.U./DL
WBC #/AREA URNS AUTO: ABNORMAL /HPF
WBC'S/HIGH POWER FIELD, WET PREP: ABNORMAL
YEAST, WET PREP: ABNORMAL

## 2024-11-09 PROCEDURE — 87086 URINE CULTURE/COLONY COUNT: CPT | Performed by: PHYSICIAN ASSISTANT

## 2024-11-09 PROCEDURE — 87210 SMEAR WET MOUNT SALINE/INK: CPT | Performed by: PHYSICIAN ASSISTANT

## 2024-11-09 PROCEDURE — 81001 URINALYSIS AUTO W/SCOPE: CPT | Performed by: PHYSICIAN ASSISTANT

## 2024-11-09 PROCEDURE — 99213 OFFICE O/P EST LOW 20 MIN: CPT | Performed by: PHYSICIAN ASSISTANT

## 2024-11-09 RX ORDER — SULFAMETHOXAZOLE AND TRIMETHOPRIM 800; 160 MG/1; MG/1
1 TABLET ORAL 2 TIMES DAILY
Qty: 10 TABLET | Refills: 0 | Status: SHIPPED | OUTPATIENT
Start: 2024-11-09 | End: 2024-11-14

## 2024-11-09 RX ORDER — SULFAMETHOXAZOLE AND TRIMETHOPRIM 800; 160 MG/1; MG/1
1 TABLET ORAL 2 TIMES DAILY
Qty: 6 TABLET | Refills: 0 | Status: CANCELLED | OUTPATIENT
Start: 2024-11-09 | End: 2024-11-12

## 2024-11-09 NOTE — TELEPHONE ENCOUNTER
Triage call  Patient calling to report she had a virtual visit on 11/5/2024 and was prescribed macrobid for a UTI.  She has neen on the antibiotic for 4 days and she is still having burning and small blood clots after she urinates.      Per protocol see PCP with in 24 hours. Care advice given.  Verbalizes understanding and agrees with plan.    Lina Chu RN   [facility] Nurse Advisor  11:43 AM 11/9/2024    Reason for Disposition   [1] Taking antibiotic > 72 hours (3 days) for UTI AND [2] painful urination or frequency is SAME (unchanged, not better)    Additional Information   Negative: Shock suspected (e.g., cold/pale/clammy skin, too weak to stand, low BP, rapid pulse)   Negative: Sounds like a life-threatening emergency to the triager   Negative: Urinary tract infection suspected, but not taking antibiotics   Negative: [1] Unable to urinate (or only a few drops) > 4 hours AND [2] bladder feels very full (e.g., palpable bladder or strong urge to urinate)   Negative: Passing pure blood or large blood clots (i.e., size > a dime)  (Exceptions: Flecks, small strands, or pinkish-red color)   Negative: Fever > 103 F (39.4 C)   Negative: Patient sounds very sick or weak to the triager   Negative: [1] SEVERE pain (e.g., excruciating) AND [2] no improvement 2 hours after pain medications   Negative: [1] Fever > 100.0 F (37.8 C) AND [2] new-onset since starting antibiotics   Negative: [1] Side (flank) or lower back pain AND [2] new-onset since starting antibiotics   Negative: [1] Taking antibiotic > 24 hours for UTI AND [2] flank or lower back pain getting WORSE   Negative: [1] Vomiting 2 or more times AND [2] interferes with taking oral antibiotic   Negative: [1] Taking antibiotic > 24 hours for UTI (urinary tract or bladder infection) AND [2] fever persists (still has fever)    Protocols used: Urinary Tract Infection on Antibiotic Follow-up Call - Female-ROBERTO

## 2024-11-09 NOTE — PROGRESS NOTES
Assessment & Plan       ICD-10-CM    1. Acute UTI (urinary tract infection)  N39.0 sulfamethoxazole-trimethoprim (BACTRIM DS) 800-160 MG tablet      2. Dysuria  R30.0 UA Macroscopic with reflex to Microscopic and Culture     Wet preparation     UA Microscopic with Reflex to Culture     Urine Culture        Signs of infection on UA, negative wet prep. Significant hematuria, but no kidney/flank pain.   Stop nitrofurantoin and start bactrim antibiotics.  We discussed signs to be seen again urgently. Also recommended follow up if new symptoms or symptoms not improving.   Recommended schedule follow up with PCP for medication/lab recheck. She scheduled office visit recheck for 11/11.  Recommended repeat UA when feeling well due to the hematuria.      Kari Al PA-C  [employer]    Nadeen Velazco is a 34 year old female who presents to clinic today for the following health issues:  Chief Complaint   Patient presents with    Urgent Care    UTI     Per patient states she is still having UTI symptoms recently finished antibiotics          11/9/2024     1:52 PM   Additional Questions   Roomed by HUSSEIN Glez   Accompanied by Self     HPI      UTI    Onset of symptoms was 7day(s).  Course of illness is worsening  Severity moderate  Current and associated symptoms dysuria, frequency, urgency, burning, blood in urine, and hesitancy, vaginal odor  Treatment and measures tried Increase fluid intake and Antibiotics  Predisposing factors include none  Patient denies rigors, flank pain, temperature > 101 degrees F., vomiting, vaginal discharge, and vaginal itching        She was recently treated via virtual visit 11/5 - nitrofurantoin x 7 days. No UA was done.  LMP about 3 weeks ago. S/p Tubal ligation.      Review of Systems  Other than noted above, general, HEENT, respiratory, cardiac, MS, and gastrointestinal systems are negative.       Objective    /75   Pulse 76   Temp 98.1  F  (36.7  C) (Tympanic)   Resp 16   Wt 76.7 kg (169 lb)   SpO2 100%   BMI 31.93 kg/m    Physical Exam   GENERAL: alert and no distress  NECK: no adenopathy, no asymmetry, masses, or scars  RESP: lungs clear to auscultation - no rales, rhonchi or wheezes  CV: regular rate and rhythm, normal S1 S2, no S3 or S4, no murmur, click or rub, no peripheral edema  ABDOMEN: soft, slight tenderness to bladder palpation with full bladder, no hepatosplenomegaly, no masses and bowel sounds normal  MS: no gross musculoskeletal defects noted, no edema  BACK: no CVA tenderness, no paralumbar tenderness  Discussed  exam, patient defers       Results for orders placed or performed in visit on 11/09/24   UA Macroscopic with reflex to Microscopic and Culture     Status: Abnormal    Specimen: Urine, Clean Catch   Result Value Ref Range    Color Urine Red (A) Colorless, Straw, Light Yellow, Yellow    Appearance Urine Clear Clear    Glucose Urine Negative Negative mg/dL    Bilirubin Urine Negative Negative    Ketones Urine Negative Negative mg/dL    Specific Gravity Urine 1.015 1.003 - 1.035    Blood Urine Moderate (A) Negative    pH Urine 8.5 (H) 5.0 - 7.0    Protein Albumin Urine Trace (A) Negative mg/dL    Urobilinogen Urine 0.2 0.2, 1.0 E.U./dL    Nitrite Urine Negative Negative    Leukocyte Esterase Urine Small (A) Negative   UA Microscopic with Reflex to Culture     Status: Abnormal   Result Value Ref Range    Bacteria Urine None Seen None Seen /HPF    RBC Urine 25-50 (A) 0-2 /HPF /HPF    WBC Urine 25-50 (A) 0-5 /HPF /HPF    Squamous Epithelials Urine Few (A) None Seen /LPF   Wet preparation     Status: Abnormal    Specimen: Vagina; Swab   Result Value Ref Range    Trichomonas Absent Absent    Yeast Absent Absent    Clue Cells Absent Absent    WBCs/high power field 2+ (A) None

## 2024-11-09 NOTE — LETTER
2024    Mora Reardon   1990        To Whom it May Concern;    Please excuse Mora Reardon from work/school for a healthcare visit on 2024. Due to illness, she will miss work -.    Sincerely,        Kair Al PA-C

## 2024-11-09 NOTE — PATIENT INSTRUCTIONS
Urinary Tract Infection (UTI) in Women: Care Instructions  Overview     A urinary tract infection (UTI) is an infection caused by bacteria. It can happen anywhere in the urinary tract. A UTI can happen in the:  Kidneys.  Ureters, the tubes that connect the kidneys to the bladder.  Bladder.  Urethra, where the urine comes out.  Most UTIs are bladder infections. They often cause pain or burning when you urinate.  Most UTIs can be cured with antibiotics. If you are prescribed antibiotics, be sure to complete your treatment so that the infection does not get worse.  Follow-up care is a key part of your treatment and safety. Be sure to make and go to all appointments, and call your doctor if you are having problems. It's also a good idea to know your test results and keep a list of the medicines you take.  How can you care for yourself at home?  Take your antibiotics as directed. Do not stop taking them just because you feel better. You need to take the full course of antibiotics.  Drink extra water and other fluids for the next day or two. This will help make the urine less concentrated and help wash out the bacteria that are causing the infection. (If you have kidney, heart, or liver disease and have to limit fluids, talk with your doctor before you increase the amount of fluids you drink.)  Avoid drinks that are carbonated or have caffeine. They can irritate the bladder.  Urinate often. Try to empty your bladder each time.  To relieve pain, take a hot bath or lay a heating pad set on low over your lower belly or genital area. Never go to sleep with a heating pad in place.  To prevent UTIs  Drink plenty of water each day. This helps you urinate often, which clears bacteria from your system. (If you have kidney, heart, or liver disease and have to limit fluids, talk with your doctor before you increase the amount of fluids you drink.)  Urinate when you need to.  If you are sexually active, urinate right after you have  "sex.  Change sanitary pads often.  Avoid douches, bubble baths, feminine hygiene sprays, and other feminine hygiene products that have deodorants.  After going to the bathroom, wipe from front to back.  When should you call for help?   Call your doctor now or seek immediate medical care if:    You have new or worse fever, chills, nausea, or vomiting.     You have new pain in your back just below your rib cage. This is called flank pain.     There is new blood or pus in your urine.     You have any problems with your antibiotic medicine.   Watch closely for changes in your health, and be sure to contact your doctor if:    You are not getting better after taking an antibiotic for 2 days.     Your symptoms go away but then come back.   Where can you learn more?  Go to https://[email or web address].net/patiented  Enter K848 in the search box to learn more about \"Urinary Tract Infection (UTI) in Women: Care Instructions.\"  Current as of: November 15, 2023  Content Version: 14.2 2024 [facility] [employer].   Care instructions adapted under license by your healthcare professional. If you have questions about a medical condition or this instruction, always ask your healthcare professional. [employer] disclaims any warranty or liability for your use of this information.    "

## 2024-11-10 LAB — BACTERIA UR CULT: NORMAL

## 2024-11-11 ENCOUNTER — OFFICE VISIT (OUTPATIENT)
Dept: FAMILY MEDICINE | Facility: CLINIC | Age: 34
End: 2024-11-11
Payer: COMMERCIAL

## 2024-11-11 VITALS
RESPIRATION RATE: 16 BRPM | WEIGHT: 172 LBS | OXYGEN SATURATION: 98 % | HEIGHT: 61 IN | DIASTOLIC BLOOD PRESSURE: 60 MMHG | TEMPERATURE: 98.4 F | HEART RATE: 76 BPM | BODY MASS INDEX: 32.47 KG/M2 | SYSTOLIC BLOOD PRESSURE: 110 MMHG

## 2024-11-11 DIAGNOSIS — E87.6 CHRONIC HYPOKALEMIA: Primary | ICD-10-CM

## 2024-11-11 DIAGNOSIS — R30.0 DYSURIA: ICD-10-CM

## 2024-11-11 DIAGNOSIS — E26.81 BARTTER'S SYNDROME (H): ICD-10-CM

## 2024-11-11 DIAGNOSIS — A54.9 GONORRHEA: ICD-10-CM

## 2024-11-11 DIAGNOSIS — Z11.3 SCREENING EXAMINATION FOR STI: ICD-10-CM

## 2024-11-11 LAB
ALBUMIN UR-MCNC: NEGATIVE MG/DL
ANION GAP SERPL CALCULATED.3IONS-SCNC: 11 MMOL/L (ref 7–15)
APPEARANCE UR: ABNORMAL
BACTERIA #/AREA URNS HPF: ABNORMAL /HPF
BILIRUB UR QL STRIP: NEGATIVE
BUN SERPL-MCNC: 13.4 MG/DL (ref 6–20)
CALCIUM SERPL-MCNC: 9.4 MG/DL (ref 8.8–10.4)
CHLORIDE SERPL-SCNC: 95 MMOL/L (ref 98–107)
COLOR UR AUTO: YELLOW
CREAT SERPL-MCNC: 0.96 MG/DL (ref 0.51–0.95)
EGFRCR SERPLBLD CKD-EPI 2021: 78 ML/MIN/1.73M2
GLUCOSE SERPL-MCNC: 133 MG/DL (ref 70–99)
GLUCOSE UR STRIP-MCNC: NEGATIVE MG/DL
HCO3 SERPL-SCNC: 31 MMOL/L (ref 22–29)
HGB UR QL STRIP: ABNORMAL
KETONES UR STRIP-MCNC: NEGATIVE MG/DL
LEUKOCYTE ESTERASE UR QL STRIP: ABNORMAL
NITRATE UR QL: NEGATIVE
PH UR STRIP: 7 [PH] (ref 5–7)
POTASSIUM SERPL-SCNC: 3.1 MMOL/L (ref 3.4–5.3)
RBC #/AREA URNS AUTO: ABNORMAL /HPF
SODIUM SERPL-SCNC: 137 MMOL/L (ref 135–145)
SP GR UR STRIP: 1.02 (ref 1–1.03)
SQUAMOUS #/AREA URNS AUTO: ABNORMAL /LPF
UROBILINOGEN UR STRIP-ACNC: 0.2 E.U./DL
WBC #/AREA URNS AUTO: ABNORMAL /HPF

## 2024-11-11 PROCEDURE — 90677 PCV20 VACCINE IM: CPT | Performed by: NURSE PRACTITIONER

## 2024-11-11 PROCEDURE — 36415 COLL VENOUS BLD VENIPUNCTURE: CPT | Performed by: NURSE PRACTITIONER

## 2024-11-11 PROCEDURE — 87086 URINE CULTURE/COLONY COUNT: CPT | Performed by: NURSE PRACTITIONER

## 2024-11-11 PROCEDURE — 80048 BASIC METABOLIC PNL TOTAL CA: CPT | Performed by: NURSE PRACTITIONER

## 2024-11-11 PROCEDURE — 90471 IMMUNIZATION ADMIN: CPT | Performed by: NURSE PRACTITIONER

## 2024-11-11 PROCEDURE — 87491 CHLMYD TRACH DNA AMP PROBE: CPT | Performed by: NURSE PRACTITIONER

## 2024-11-11 PROCEDURE — 87591 N.GONORRHOEAE DNA AMP PROB: CPT | Performed by: NURSE PRACTITIONER

## 2024-11-11 PROCEDURE — 90472 IMMUNIZATION ADMIN EACH ADD: CPT | Performed by: NURSE PRACTITIONER

## 2024-11-11 PROCEDURE — 90651 9VHPV VACCINE 2/3 DOSE IM: CPT | Performed by: NURSE PRACTITIONER

## 2024-11-11 PROCEDURE — 81001 URINALYSIS AUTO W/SCOPE: CPT | Performed by: NURSE PRACTITIONER

## 2024-11-11 PROCEDURE — 99214 OFFICE O/P EST MOD 30 MIN: CPT | Mod: 25 | Performed by: NURSE PRACTITIONER

## 2024-11-11 ASSESSMENT — PAIN SCALES - GENERAL: PAINLEVEL_OUTOF10: NO PAIN (0)

## 2024-11-11 NOTE — PROGRESS NOTES
Assessment & Plan     Chronic hypokalemia  Labs show hypokalemia continue with replacement and recheck labs in 2-4 weeks.   - Basic metabolic panel  (Ca, Cl, CO2, Creat, Gluc, K, Na, BUN); Future  - Basic metabolic panel  (Ca, Cl, CO2, Creat, Gluc, K, Na, BUN)    Screening examination for STI  Ongoing dysuria despite antibiotics and negative culture.  STI screening for further evaluation  - Chlamydia trachomatis/Neisseria gonorrhoeae by PCR; Future  - Chlamydia trachomatis/Neisseria gonorrhoeae by PCR    Dysuria  Per above.  - UA Macroscopic with reflex to Microscopic and Culture - Lab Collect; Future  - UA Macroscopic with reflex to Microscopic and Culture - Lab Collect  - Urine Microscopic Exam  - Urine Culture    Bartter's syndrome (H)  Comment: per above.   Plan: potassium chloride rina ER (KLOR-CON M20) 20         MEQ CR tablet, Basic metabolic panel  (Ca, Cl,         CO2, Creat, Gluc, K, Na, BUN)              Nadeen Velazco is a 34 year old, presenting for the following health issues:  UTI (UTI follow up.  Urgent care visit on 11/09/2024 for UTI.  Patient experiencing hematuria, dysuria, strong urine odor.)        11/11/2024    11:13 AM   Additional Questions   Roomed by Kacey REYES   Accompanied by None         11/11/2024    11:13 AM   Patient Reported Additional Medications   Patient reports taking the following new medications none     History of Present Illness       Reason for visit:  Hurts when using bathroom    She eats 0-1 servings of fruits and vegetables daily.She consumes 3 sweetened beverage(s) daily.She exercises with enough effort to increase her heart rate 60 or more minutes per day.  She exercises with enough effort to increase her heart rate 5 days per week. She is missing 1 dose(s) of medications per week.  She is not taking prescribed medications regularly due to remembering to take.         ED/UC Followup:    Facility:  [facility]  Date of visit:  "11/09/2024  Reason for visit: UTI  Current Status: still experiencing symptoms of UTI  Genitourinary - Female  Onset/Duration: 11/04/2024  Description:   Painful urination (Dysuria): YES           Frequency: YES  Blood in urine (Hematuria): YES  Delay in urine (Hesitency): YES  Intensity: moderate  Progression of Symptoms:  same  Accompanying Signs & Symptoms:  Fever/chills: YES- chills   Flank pain: No  Nausea and vomiting: No  Vaginal symptoms: discharge-vaginal   Abdominal/Pelvic Pain: YES- suprapubic   History:   History of frequent UTI s: No  History of kidney stones: No  Sexually Active: YES  Possibility of pregnancy: No  Precipitating or alleviating factors: None  Therapies tried and outcome: Increase fluid intake and antibiotics      Review of Systems  Constitutional, HEENT, cardiovascular, pulmonary, gi and gu systems are negative, except as otherwise noted.      Objective    /60 (BP Location: Right arm, Cuff Size: Adult Regular)   Pulse 76   Temp 98.4  F (36.9  C) (Tympanic)   Resp 16   Ht 1.549 m (5' 1\")   Wt 78 kg (172 lb)   LMP 10/28/2024   SpO2 98%   Breastfeeding No   BMI 32.50 kg/m    Body mass index is 32.5 kg/m .  Physical Exam   GENERAL: alert and no distress  NECK: no adenopathy, no asymmetry, masses, or scars  RESP: lungs clear to auscultation - no rales, rhonchi or wheezes  CV: regular rate and rhythm, normal S1 S2, no S3 or S4, no murmur, click or rub, no peripheral edema  ABDOMEN: soft, nontender, no hepatosplenomegaly, no masses and bowel sounds normal  PSYCH: mentation appears normal, affect normal/bright    Results for orders placed or performed in visit on 11/11/24   UA Macroscopic with reflex to Microscopic and Culture - Lab Collect     Status: Abnormal    Specimen: Urine, Clean Catch   Result Value Ref Range    Color Urine Yellow Colorless, Straw, Light Yellow, Yellow    Appearance Urine Slightly Cloudy (A) Clear    Glucose Urine Negative Negative mg/dL    Bilirubin Urine " Negative Negative    Ketones Urine Negative Negative mg/dL    Specific Gravity Urine 1.020 1.003 - 1.035    Blood Urine Moderate (A) Negative    pH Urine 7.0 5.0 - 7.0    Protein Albumin Urine Negative Negative mg/dL    Urobilinogen Urine 0.2 0.2, 1.0 E.U./dL    Nitrite Urine Negative Negative    Leukocyte Esterase Urine Large (A) Negative   Urine Microscopic Exam     Status: Abnormal   Result Value Ref Range    Bacteria Urine Many (A) None Seen /HPF    RBC Urine 25-50 (A) 0-2 /HPF /HPF    WBC Urine  (A) 0-5 /HPF /HPF    Squamous Epithelials Urine Moderate (A) None Seen /LPF           Signed Electronically by: ADELAIDE Hinojosa CNP

## 2024-11-12 LAB
C TRACH DNA SPEC QL PROBE+SIG AMP: NEGATIVE
N GONORRHOEA DNA SPEC QL NAA+PROBE: POSITIVE

## 2024-11-12 RX ORDER — POTASSIUM CHLORIDE 1500 MG/1
40 TABLET, EXTENDED RELEASE ORAL DAILY
Qty: 60 TABLET | Refills: 3 | Status: SHIPPED | OUTPATIENT
Start: 2024-11-12

## 2024-11-12 RX ORDER — AZITHROMYCIN 500 MG/1
2000 TABLET, FILM COATED ORAL DAILY
Qty: 4 TABLET | Refills: 0 | Status: SHIPPED | OUTPATIENT
Start: 2024-11-12 | End: 2024-11-13

## 2024-11-12 RX ORDER — GENTAMICIN 40 MG/ML
240 INJECTION, SOLUTION INTRAMUSCULAR; INTRAVENOUS ONCE
Status: ACTIVE | OUTPATIENT
Start: 2024-11-12

## 2024-11-13 ENCOUNTER — TELEPHONE (OUTPATIENT)
Dept: FAMILY MEDICINE | Facility: CLINIC | Age: 34
End: 2024-11-13

## 2024-11-13 DIAGNOSIS — A54.9 GONORRHEA: Primary | ICD-10-CM

## 2024-11-13 LAB — BACTERIA UR CULT: NORMAL

## 2024-11-13 RX ORDER — CIPROFLOXACIN 500 MG/1
500 TABLET, FILM COATED ORAL ONCE
Qty: 1 TABLET | Refills: 0 | Status: SHIPPED | OUTPATIENT
Start: 2024-11-13 | End: 2024-11-13

## 2024-11-13 NOTE — TELEPHONE ENCOUNTER
Patient notified. She will schedule her 1 week follow up lab appt via my chart after work.   Shraddha BERNARD RN

## 2024-11-13 NOTE — TELEPHONE ENCOUNTER
Writer contacted Mora to hold off coming to [place] until further notice as we do not stock gentamicin. Did confirm her reaction to Kelfex was hives. She does not recall ever being given rocephin in the past. [place] [facility] and [facility] do not either. Urgent Care in [place] has the ability to order from the inpatient pharmacy. Write spoke to Bonnie at inpatient pharmacy who says they stock gentamicin 40 mg/ml vials for IM or IV use.  Writer spoke to [place] [facility] provider who states the patient would have to register to be seen in order to get the injection.   [facility] could order this and it would arrive Monday 11/18.  Please advise, Shraddha BERNARD RN

## 2024-11-13 NOTE — TELEPHONE ENCOUNTER
We can do a single dose of oral cipro 500 mg and have her recheck for cure in 1 week. Should avoid sexual activity in this time.     ADELAIDE Hinojosa CNP

## 2024-11-13 NOTE — TELEPHONE ENCOUNTER
Patient on RN schedule today for gentamicin injections. We do not have this in clinic.   Please advise, Shraddha BERNARD RN

## 2024-12-02 ENCOUNTER — MYC REFILL (OUTPATIENT)
Dept: ORTHOPEDICS | Facility: CLINIC | Age: 34
End: 2024-12-02
Payer: COMMERCIAL

## 2024-12-02 DIAGNOSIS — M54.50 ACUTE MIDLINE LOW BACK PAIN WITHOUT SCIATICA: ICD-10-CM

## 2024-12-02 DIAGNOSIS — M54.17 LUMBOSACRAL RADICULOPATHY: ICD-10-CM

## 2024-12-02 DIAGNOSIS — M43.10 SPONDYLOLISTHESIS, GRADE 2: ICD-10-CM

## 2024-12-02 RX ORDER — GABAPENTIN 300 MG/1
300 CAPSULE ORAL 3 TIMES DAILY
Qty: 90 CAPSULE | Refills: 3 | Status: SHIPPED | OUTPATIENT
Start: 2024-12-02

## 2024-12-05 ENCOUNTER — E-VISIT (OUTPATIENT)
Dept: URGENT CARE | Facility: CLINIC | Age: 34
End: 2024-12-05
Payer: COMMERCIAL

## 2024-12-05 ENCOUNTER — OFFICE VISIT (OUTPATIENT)
Dept: URGENT CARE | Facility: URGENT CARE | Age: 34
End: 2024-12-05
Payer: COMMERCIAL

## 2024-12-05 VITALS
TEMPERATURE: 98.3 F | WEIGHT: 172 LBS | DIASTOLIC BLOOD PRESSURE: 62 MMHG | OXYGEN SATURATION: 100 % | HEART RATE: 83 BPM | RESPIRATION RATE: 16 BRPM | BODY MASS INDEX: 32.5 KG/M2 | SYSTOLIC BLOOD PRESSURE: 115 MMHG

## 2024-12-05 DIAGNOSIS — L50.9 HIVES: Primary | ICD-10-CM

## 2024-12-05 DIAGNOSIS — R21 RASH: Primary | ICD-10-CM

## 2024-12-05 RX ORDER — PREDNISONE 20 MG/1
40 TABLET ORAL DAILY
Qty: 10 TABLET | Refills: 0 | Status: SHIPPED | OUTPATIENT
Start: 2024-12-05

## 2024-12-05 RX ORDER — PREDNISONE 20 MG/1
40 TABLET ORAL DAILY
Qty: 10 TABLET | Refills: 0 | Status: SHIPPED | OUTPATIENT
Start: 2024-12-05 | End: 2024-12-05

## 2024-12-05 ASSESSMENT — ENCOUNTER SYMPTOMS
SHORTNESS OF BREATH: 0
CHOKING: 0
FEVER: 0
COUGH: 0
DIZZINESS: 0
HEADACHES: 0
DIARRHEA: 0
SORE THROAT: 0
NECK STIFFNESS: 0
CARDIOVASCULAR NEGATIVE: 1
CHILLS: 0
ARTHRALGIAS: 0
JOINT SWELLING: 0
ALLERGIC/IMMUNOLOGIC NEGATIVE: 1
ENDOCRINE NEGATIVE: 1
WEAKNESS: 0
PALPITATIONS: 0
MYALGIAS: 0
NECK PAIN: 0
MUSCULOSKELETAL NEGATIVE: 1
VOMITING: 0
LIGHT-HEADEDNESS: 0
EYES NEGATIVE: 1
RHINORRHEA: 0
RESPIRATORY NEGATIVE: 1
CHEST TIGHTNESS: 0
BACK PAIN: 0
NAUSEA: 0
WHEEZING: 0
WOUND: 0

## 2024-12-05 NOTE — PATIENT INSTRUCTIONS
Dear Mora Reardon?     After reviewing your responses, I am unable to make a diagnosis that can be treated online.    You will not be charged for this eVisit.     We are dedicated to helping you achieve your best health and would like to see you in one of our many clinic locations - a primary care provider would be ideal for your concern.    Please use Normal to schedule a visit with a provider or call [phone]) to schedule at any of our locations.    Thanks for choosing?us?as your health care partner,?   ?   LUZ BUSH, ADELAIDE CNP?

## 2024-12-06 NOTE — PROGRESS NOTES
Chief Complaint:    Chief Complaint   Patient presents with    Derm Problem     X 1 day, has tried benadryl and calamine lotion     Medical Decision Making:    Vital signs reviewed by Chay Sosa PA-C  /62   Pulse 83   Temp 98.3  F (36.8  C) (Tympanic)   Resp 16   Wt 78 kg (172 lb)   LMP 10/28/2024   SpO2 100%   BMI 32.50 kg/m      Differential Diagnosis:  Rash: Atopic dermatitis  Contact dermatitis  Dermatitis  Eczema      ASSESSMENT:     1. Hives         PLAN:    Rx for Prednisone  Continue Benadryl PRN  Patient instructed to follow up with PCP in 1 week if symptoms are not improving.  Sooner if symptoms worsen.  Worrisome symptoms discussed with instructions to go to the ED.  Patient verbalized understanding and agreed with this plan.    Labs:     No results found for any visits on 12/05/24.    Current Meds:    Current Outpatient Medications:     acetaminophen (TYLENOL) 325 MG tablet, Take 325-650 mg by mouth every 6 hours as needed for mild pain, Disp: , Rfl:     gabapentin (NEURONTIN) 300 MG capsule, Take 1 capsule (300 mg) by mouth 3 times daily., Disp: 90 capsule, Rfl: 3    potassium chloride rina ER (KLOR-CON M20) 20 MEQ CR tablet, Take 2 tablets (40 mEq) by mouth daily., Disp: 60 tablet, Rfl: 3    predniSONE (DELTASONE) 20 MG tablet, Take 2 tablets (40 mg) by mouth daily for 5 days., Disp: 10 tablet, Rfl: 0    Current Facility-Administered Medications:     gentamicin (GARAMYCIN) injection 240 mg, 240 mg, Intramuscular, Once,     Allergies:  Allergies   Allergen Reactions    Keflex [Cephalexin Monohydrate] Hives and Rash       SUBJECTIVE    HPI: Mora Reardon is an 34 year old female who presents for evaluation and treatment of rash. The patient reports that she developed rash 2 days ago. The rash is scattered all over her body: trunk, back, arms, legs and neck and it feels itching and somewhat burning. The rash first appeared on her arms and shortly after spread all over her body. She  denies previous episodes of such rash. Mora used benadryl and calamine lotion to relief her symptoms with temporary relief. She admits to using a soap detergent with new fragrance.   The patient denies any wheezing, choking, cough, fever, chest pain or palpitations.    ROS:      Review of Systems   Constitutional:  Negative for chills and fever.   HENT:  Negative for congestion, ear pain, rhinorrhea and sore throat.    Eyes: Negative.    Respiratory: Negative.  Negative for cough, choking, chest tightness, shortness of breath and wheezing.    Cardiovascular: Negative.  Negative for chest pain and palpitations.   Gastrointestinal:  Negative for diarrhea, nausea and vomiting.   Endocrine: Negative.    Genitourinary: Negative.    Musculoskeletal: Negative.  Negative for arthralgias, back pain, joint swelling, myalgias, neck pain and neck stiffness.   Skin:  Positive for rash. Negative for wound.   Allergic/Immunologic: Negative.  Negative for immunocompromised state.   Neurological:  Negative for dizziness, weakness, light-headedness and headaches.        Family History   Family History   Problem Relation Age of Onset    Diabetes Mother     Thyroid Disease Mother     Depression Mother     Hypertension Paternal Grandmother     Cancer - colorectal Paternal Grandmother         colon    Cancer Paternal Grandfather         throat cancer, not smoker    Genetic Disorder Sister         Bartter Syndrome       Social History  Social History     Socioeconomic History    Marital status:      Spouse name: Not on file    Number of children: 1    Years of education: Not on file    Highest education level: Not on file   Occupational History    Not on file   Tobacco Use    Smoking status: Every Day     Current packs/day: 0.50     Average packs/day: 0.5 packs/day for 6.0 years (3.0 ttl pk-yrs)     Types: Cigarettes    Smokeless tobacco: Never   Vaping Use    Vaping status: Never Used   Substance and Sexual Activity    Alcohol  use: No     Alcohol/week: 0.0 standard drinks of alcohol    Drug use: No     Comment: prior use 7/2016    Sexual activity: Yes     Partners: Male   Other Topics Concern    Parent/sibling w/ CABG, MI or angioplasty before 65F 55M? No     Service No    Blood Transfusions No    Caffeine Concern Yes     Comment: 1-2 cans a day    Occupational Exposure No    Hobby Hazards No    Sleep Concern No    Stress Concern No    Weight Concern No    Special Diet No    Back Care No    Exercise No    Bike Helmet No    Seat Belt Yes    Self-Exams Not Asked   Social History Narrative    Not on file     Social Drivers of Health     Financial Resource Strain: Not on file   Food Insecurity: Not on file   Transportation Needs: Not on file   Physical Activity: Not on file   Stress: Not on file   Social Connections: Not on file   Interpersonal Safety: Low Risk  (8/28/2024)    Interpersonal Safety     Do you feel physically and emotionally safe where you currently live?: Yes     Within the past 12 months, have you been hit, slapped, kicked or otherwise physically hurt by someone?: No     Within the past 12 months, have you been humiliated or emotionally abused in other ways by your partner or ex-partner?: No   Housing Stability: Not on file        Surgical History:  Past Surgical History:   Procedure Laterality Date    DILATION AND CURETTAGE SUCTION WITH ULTRASOUND GUIDANCE N/A 3/26/2019    Procedure: PRUDENCIO CURETTAGE WITH REMOVAL OF PLACENTA UNDER ULTRASOUND GUIDANCE;  Surgeon: Savi Smart MD;  Location: [facility] OR    LAPAROSCOPIC SALPINGECTOMY Bilateral 4/12/2021    Procedure: SALPINGECTOMY, LAPAROSCOPIC LAPAROSCOPIC REMOVAL OF DISPLACED INTRAUTERINE DEVICE.;  Surgeon: Isabel Sherwood DO;  Location: [facility] OR    TOE SURGERY      removal of glass        Problem List:  Patient Active Problem List   Diagnosis    Bipolar disorder (H)    Constipation    Bartter's syndrome    TOBACCO USE DISORDER-quit    CTS (carpal tunnel syndrome)     GERD (gastroesophageal reflux disease)    Rh negative, antepartum    Cervical high risk HPV (human papillomavirus) test positive    Encounter for sterilization    Status post bilateral salpingectomy    Bartter's syndrome (H)    Acute midline low back pain without sciatica    Spondylolisthesis, grade 2    Lumbosacral radiculopathy    Paresthesias    Chronic hypokalemia    Gitelman syndrome    Bartter syndrome (H)        OBJECTIVE:     Vital signs noted and reviewed by Chay Sosa PA-C  /62   Pulse 83   Temp 98.3  F (36.8  C) (Tympanic)   Resp 16   Wt 78 kg (172 lb)   LMP 10/28/2024   SpO2 100%   BMI 32.50 kg/m       PEFR:    Physical Exam  Vitals and nursing note reviewed.   Constitutional:       General: She is not in acute distress.     Appearance: She is well-developed. She is not ill-appearing, toxic-appearing or diaphoretic.   HENT:      Head: Normocephalic and atraumatic.      Right Ear: Tympanic membrane and external ear normal. No drainage, swelling or tenderness. Tympanic membrane is not perforated, erythematous, retracted or bulging.      Left Ear: Tympanic membrane and external ear normal. No drainage, swelling or tenderness. Tympanic membrane is not perforated, erythematous, retracted or bulging.      Nose: No mucosal edema, congestion or rhinorrhea.      Right Sinus: No maxillary sinus tenderness or frontal sinus tenderness.      Left Sinus: No maxillary sinus tenderness or frontal sinus tenderness.      Mouth/Throat:      Pharynx: No pharyngeal swelling, oropharyngeal exudate, posterior oropharyngeal erythema or uvula swelling.      Tonsils: No tonsillar abscesses.   Eyes:      Pupils: Pupils are equal, round, and reactive to light.   Neck:      Trachea: Trachea normal.   Cardiovascular:      Rate and Rhythm: Normal rate and regular rhythm.      Heart sounds: Normal heart sounds, S1 normal and S2 normal. No murmur heard.     No friction rub. No gallop.   Pulmonary:      Effort:  Pulmonary effort is normal. No respiratory distress.      Breath sounds: Normal breath sounds. No decreased breath sounds, wheezing, rhonchi or rales.   Abdominal:      General: Bowel sounds are normal. There is no distension.      Palpations: Abdomen is soft. Abdomen is not rigid. There is no mass.      Tenderness: There is no abdominal tenderness. There is no guarding or rebound.   Musculoskeletal:      Cervical back: Full passive range of motion without pain, normal range of motion and neck supple.   Lymphadenopathy:      Cervical: No cervical adenopathy.   Skin:     General: Skin is warm and dry.      Findings: Rash present. Rash is urticarial.      Comments: Widespread rash on chest, back, arms, legs and neck.   Neurological:      Mental Status: She is alert and oriented to person, place, and time.      Cranial Nerves: No cranial nerve deficit.      Deep Tendon Reflexes: Reflexes are normal and symmetric.   Psychiatric:         Behavior: Behavior normal. Behavior is cooperative.         Thought Content: Thought content normal.         Judgment: Judgment normal.             Chay Sosa PA-C  12/5/2024, 6:30 PM

## 2025-01-10 ENCOUNTER — OFFICE VISIT (OUTPATIENT)
Dept: FAMILY MEDICINE | Facility: CLINIC | Age: 35
End: 2025-01-10
Payer: COMMERCIAL

## 2025-01-10 VITALS
BODY MASS INDEX: 31.53 KG/M2 | DIASTOLIC BLOOD PRESSURE: 62 MMHG | WEIGHT: 167 LBS | SYSTOLIC BLOOD PRESSURE: 106 MMHG | HEIGHT: 61 IN | RESPIRATION RATE: 16 BRPM | OXYGEN SATURATION: 98 % | HEART RATE: 82 BPM | TEMPERATURE: 97.6 F

## 2025-01-10 DIAGNOSIS — F31.9 BIPOLAR AFFECTIVE DISORDER, REMISSION STATUS UNSPECIFIED (H): ICD-10-CM

## 2025-01-10 DIAGNOSIS — E87.6 CHRONIC HYPOKALEMIA: ICD-10-CM

## 2025-01-10 DIAGNOSIS — M25.50 MULTIPLE JOINT PAIN: ICD-10-CM

## 2025-01-10 DIAGNOSIS — A54.9 GONORRHEA: ICD-10-CM

## 2025-01-10 DIAGNOSIS — E87.6 HYPOPOTASSEMIA: ICD-10-CM

## 2025-01-10 DIAGNOSIS — R76.8 ELEVATED ANTINUCLEAR ANTIBODY (ANA) LEVEL: ICD-10-CM

## 2025-01-10 DIAGNOSIS — E55.9 VITAMIN D DEFICIENCY: ICD-10-CM

## 2025-01-10 DIAGNOSIS — G56.02 CARPAL TUNNEL SYNDROME OF LEFT WRIST: Primary | ICD-10-CM

## 2025-01-10 DIAGNOSIS — R20.2 PARESTHESIAS: ICD-10-CM

## 2025-01-10 LAB
ANION GAP SERPL CALCULATED.3IONS-SCNC: 14 MMOL/L (ref 7–15)
BUN SERPL-MCNC: 18.4 MG/DL (ref 6–20)
CALCIUM SERPL-MCNC: 9.8 MG/DL (ref 8.8–10.4)
CHLORIDE SERPL-SCNC: 98 MMOL/L (ref 98–107)
CREAT SERPL-MCNC: 0.84 MG/DL (ref 0.51–0.95)
CRP SERPL-MCNC: 18.65 MG/L
EGFRCR SERPLBLD CKD-EPI 2021: >90 ML/MIN/1.73M2
ERYTHROCYTE [DISTWIDTH] IN BLOOD BY AUTOMATED COUNT: 12.8 % (ref 10–15)
ERYTHROCYTE [SEDIMENTATION RATE] IN BLOOD BY WESTERGREN METHOD: 24 MM/HR (ref 0–20)
GLUCOSE SERPL-MCNC: 107 MG/DL (ref 70–99)
HCO3 SERPL-SCNC: 31 MMOL/L (ref 22–29)
HCT VFR BLD AUTO: 37.7 % (ref 35–47)
HGB BLD-MCNC: 13.2 G/DL (ref 11.7–15.7)
MAGNESIUM SERPL-MCNC: 2 MG/DL (ref 1.7–2.3)
MCH RBC QN AUTO: 30.3 PG (ref 26.5–33)
MCHC RBC AUTO-ENTMCNC: 35 G/DL (ref 31.5–36.5)
MCV RBC AUTO: 87 FL (ref 78–100)
PLATELET # BLD AUTO: 321 10E3/UL (ref 150–450)
POTASSIUM SERPL-SCNC: 3.2 MMOL/L (ref 3.4–5.3)
RBC # BLD AUTO: 4.35 10E6/UL (ref 3.8–5.2)
SODIUM SERPL-SCNC: 143 MMOL/L (ref 135–145)
WBC # BLD AUTO: 6.4 10E3/UL (ref 4–11)

## 2025-01-10 PROCEDURE — 83735 ASSAY OF MAGNESIUM: CPT | Performed by: NURSE PRACTITIONER

## 2025-01-10 PROCEDURE — 80048 BASIC METABOLIC PNL TOTAL CA: CPT | Performed by: NURSE PRACTITIONER

## 2025-01-10 PROCEDURE — 86431 RHEUMATOID FACTOR QUANT: CPT | Performed by: NURSE PRACTITIONER

## 2025-01-10 PROCEDURE — 36415 COLL VENOUS BLD VENIPUNCTURE: CPT | Performed by: NURSE PRACTITIONER

## 2025-01-10 PROCEDURE — 86039 ANTINUCLEAR ANTIBODIES (ANA): CPT | Performed by: NURSE PRACTITIONER

## 2025-01-10 PROCEDURE — 86038 ANTINUCLEAR ANTIBODIES: CPT | Performed by: NURSE PRACTITIONER

## 2025-01-10 PROCEDURE — 87591 N.GONORRHOEAE DNA AMP PROB: CPT | Performed by: NURSE PRACTITIONER

## 2025-01-10 PROCEDURE — 82306 VITAMIN D 25 HYDROXY: CPT | Performed by: NURSE PRACTITIONER

## 2025-01-10 PROCEDURE — 86140 C-REACTIVE PROTEIN: CPT | Performed by: NURSE PRACTITIONER

## 2025-01-10 PROCEDURE — 85652 RBC SED RATE AUTOMATED: CPT | Performed by: NURSE PRACTITIONER

## 2025-01-10 PROCEDURE — 82607 VITAMIN B-12: CPT | Performed by: NURSE PRACTITIONER

## 2025-01-10 PROCEDURE — 99214 OFFICE O/P EST MOD 30 MIN: CPT | Performed by: NURSE PRACTITIONER

## 2025-01-10 PROCEDURE — 85027 COMPLETE CBC AUTOMATED: CPT | Performed by: NURSE PRACTITIONER

## 2025-01-10 ASSESSMENT — PAIN SCALES - GENERAL: PAINLEVEL_OUTOF10: SEVERE PAIN (7)

## 2025-01-10 NOTE — PROGRESS NOTES
Assessment & Plan     Carpal tunnel syndrome of left wrist  With elbow and wrist symptoms recommend bracing and hand therapy referral placed. Symptomatic care and follow up discussed  - Wrist/Arm/Hand Bracing Supplies Order Wrist Brace; Left; non-thumb spica  - Occupational Therapy  Referral; Future    Multiple joint pain  Labs ordered for for further evaluation. Inflammatory markers and ALTAGRACIA elevated, rheumatology referral placed for further evaluation and plan.  - Basic metabolic panel  (Ca, Cl, CO2, Creat, Gluc, K, Na, BUN); Future  - CRP, inflammation; Future  - ESR: Erythrocyte sedimentation rate; Future  - Anti Nuclear Layla IgG by IFA with Reflex; Future  - Rheumatoid factor; Future  - CBC with platelets; Future  - CRP, inflammation  - ESR: Erythrocyte sedimentation rate  - Anti Nuclear Layla IgG by IFA with Reflex  - Rheumatoid factor  - CBC with platelets  - Adult Rheumatology  Referral; Future    Bartter's syndrome  Potassium stable, recommend consistent use.   - Basic metabolic panel  (Ca, Cl, CO2, Creat, Gluc, K, Na, BUN); Future  - Magnesium; Future  - Magnesium  - potassium chloride rina ER (KLOR-CON M20) 20 MEQ CR tablet; Take 2 tablets (40 mEq) by mouth daily.    Paresthesias  Per above. Vitamin D deficient on labs, replacement recommended.   - Occupational Therapy  Referral; Future  - Vitamin B12; Future  - Vitamin D Deficiency; Future  - Vitamin B12  - Vitamin D Deficiency    Bipolar affective disorder, remission status unspecified (H)  Stable, no current medications.     Gonorrhea  Recheck for cure shows positive gonorrhea again. Plan ceftriaxone : 500 mg intramuscularly as a single dose, does have an allergy with a rash/hives with cephalexin. Has had Rocephin in the past without a reaction so will use again and monitor closely after injection. Discussed that sexual partners within the last 60 days should be assessed and treated.  Plan repeat labs to test for cure in 2-4  weeks with a lab only appointment.  - Neisseria gonorrhoeae PCR - Clinic Collect  - cefTRIAXone (ROCEPHIN) in lidocaine 1% for IM administration only 500 mg  - Chlamydia trachomatis/Neisseria gonorrhoeae by PCR; Future    Chronic hypokalemia  Stable per above.   - Basic metabolic panel  (Ca, Cl, CO2, Creat, Gluc, K, Na, BUN)  - potassium chloride rina ER (KLOR-CON M20) 20 MEQ CR tablet; Take 2 tablets (40 mEq) by mouth daily.    Elevated antinuclear antibody (ALTAGRACIA) level  Per above.   - Adult Rheumatology  Referral; Future    Vitamin D deficiency  Your vitamin D was low at 11.  Plan to supplement with vitamin D3 50,000 international units to be taken once weekly for 8 weeks.  Following once weekly treatment I would like you to supplement with 2000 international units of vitamin D3 daily which is available over the counter.   - vitamin D3 (CHOLECALCIFEROL) 1.25 MG (97289 UT) capsule; Take 1 capsule (50,000 Units) by mouth every 7 days for 8 doses.      Nadeen Velazco is a 34 year old, presenting for the following health issues:  Referral (Would like a referral for physical therapy for back pain. Has seen a spine specialist in the past. )        1/10/2025    10:42 AM   Additional Questions   Roomed by Sana RIOJAS   Accompanied by self         1/10/2025    10:42 AM   Patient Reported Additional Medications   Patient reports taking the following new medications no new meds     History of Present Illness       Back Pain:  She presents for follow up of back pain. Patient's back pain is a recurring problem.  Location of back pain:  Right shoulder, right buttock and left buttock  Description of back pain: dull ache  Back pain spreads: nowhere    Since patient first noticed back pain, pain is: always present, but gets better and worse  Does back pain interfere with her job:  Yes       Reason for visit:  Left hand pain and numb pines and neddles cant close hand    She eats 0-1 servings of fruits and vegetables  "daily.She consumes 3 sweetened beverage(s) daily.She exercises with enough effort to increase her heart rate 60 or more minutes per day.  She exercises with enough effort to increase her heart rate 5 days per week. She is missing 1 dose(s) of medications per week.       Review of Systems  Constitutional, HEENT, cardiovascular, pulmonary, gi and gu systems are negative, except as otherwise noted.      Objective    /62   Pulse 82   Temp 97.6  F (36.4  C) (Tympanic)   Resp 16   Ht 1.549 m (5' 1\")   Wt 75.8 kg (167 lb)   LMP 01/04/2025   SpO2 98%   BMI 31.55 kg/m    Body mass index is 31.55 kg/m .  Physical Exam   GENERAL: alert and no distress  MS: extremities normal- no gross deformities noted, Positive Phalen's bilaterally   NEURO: Normal strength and tone, mentation intact and speech normal  PSYCH: mentation appears normal, affect normal/bright    Results for orders placed or performed in visit on 01/10/25   Basic metabolic panel  (Ca, Cl, CO2, Creat, Gluc, K, Na, BUN)     Status: Abnormal   Result Value Ref Range    Sodium 143 135 - 145 mmol/L    Potassium 3.2 (L) 3.4 - 5.3 mmol/L    Chloride 98 98 - 107 mmol/L    Carbon Dioxide (CO2) 31 (H) 22 - 29 mmol/L    Anion Gap 14 7 - 15 mmol/L    Urea Nitrogen 18.4 6.0 - 20.0 mg/dL    Creatinine 0.84 0.51 - 0.95 mg/dL    GFR Estimate >90 >60 mL/min/1.73m2    Calcium 9.8 8.8 - 10.4 mg/dL    Glucose 107 (H) 70 - 99 mg/dL   CRP, inflammation     Status: Abnormal   Result Value Ref Range    CRP Inflammation 18.65 (H) <5.00 mg/L   ESR: Erythrocyte sedimentation rate     Status: Abnormal   Result Value Ref Range    Erythrocyte Sedimentation Rate 24 (H) 0 - 20 mm/hr   Anti Nuclear Layla IgG by IFA with Reflex     Status: Abnormal   Result Value Ref Range    ALTAGRACIA interpretation Positive (A) Negative    ALTAGRACIA pattern 1 Speckled     ALTAGRACIA titer 1 1:160    Rheumatoid factor     Status: Normal   Result Value Ref Range    Rheumatoid Factor <10 <14 IU/mL   CBC with platelets     " Status: Normal   Result Value Ref Range    WBC Count 6.4 4.0 - 11.0 10e3/uL    RBC Count 4.35 3.80 - 5.20 10e6/uL    Hemoglobin 13.2 11.7 - 15.7 g/dL    Hematocrit 37.7 35.0 - 47.0 %    MCV 87 78 - 100 fL    MCH 30.3 26.5 - 33.0 pg    MCHC 35.0 31.5 - 36.5 g/dL    RDW 12.8 10.0 - 15.0 %    Platelet Count 321 150 - 450 10e3/uL   Magnesium     Status: Normal   Result Value Ref Range    Magnesium 2.0 1.7 - 2.3 mg/dL   Vitamin B12     Status: Normal   Result Value Ref Range    Vitamin B12 416 232 - 1,245 pg/mL   Vitamin D Deficiency     Status: Abnormal   Result Value Ref Range    Vitamin D, Total (25-Hydroxy) 11 (L) 20 - 50 ng/mL    Narrative    Season, race, dietary intake, and treatment affect the concentration of 25-hydroxy-Vitamin D. Values may decrease during winter months and increase during summer months.    Vitamin D determination is routinely performed by an immunoassay specific for 25 hydroxyvitamin D3.  If an individual is on vitamin D2(ergocalciferol) supplementation, please specify 25 OH vitamin D2 and D3 level determination by LCMSMS test VITD23.     Neisseria gonorrhoeae PCR - Clinic Collect     Status: Abnormal    Specimen: Urine, Voided   Result Value Ref Range    Neisseria gonorrhoeae Positive (A) Negative    Neisseria gonorrhoeae Specimen Source Urine, Voided            Signed Electronically by: ADELAIDE Hinojosa CNP

## 2025-01-10 NOTE — LETTER
1/10/2025    Mora Reardon   1990        To Whom it May Concern;    Please excuse Mora Reardon from work/school for a healthcare visit on Christofer 10, 2025.    Sincerely,        ADELAIDE Hinojosa CNP

## 2025-01-11 LAB
N GONORRHOEA DNA SPEC QL NAA+PROBE: POSITIVE
RHEUMATOID FACT SERPL-ACNC: <10 IU/ML
SPECIMEN TYPE: ABNORMAL
VIT B12 SERPL-MCNC: 416 PG/ML (ref 232–1245)
VIT D+METAB SERPL-MCNC: 11 NG/ML (ref 20–50)

## 2025-01-13 LAB
ANA PAT SER IF-IMP: ABNORMAL
ANA SER QL IF: POSITIVE
ANA TITR SER IF: ABNORMAL {TITER}

## 2025-01-13 RX ORDER — CEFTRIAXONE SODIUM 1 G
500 VIAL (EA) INJECTION ONCE
Status: COMPLETED | OUTPATIENT
Start: 2025-01-13 | End: 2025-01-14

## 2025-01-13 RX ORDER — POTASSIUM CHLORIDE 1500 MG/1
40 TABLET, EXTENDED RELEASE ORAL DAILY
Qty: 60 TABLET | Refills: 5 | Status: SHIPPED | OUTPATIENT
Start: 2025-01-13

## 2025-01-14 ENCOUNTER — TELEPHONE (OUTPATIENT)
Dept: FAMILY MEDICINE | Facility: CLINIC | Age: 35
End: 2025-01-14

## 2025-01-14 ENCOUNTER — ALLIED HEALTH/NURSE VISIT (OUTPATIENT)
Dept: FAMILY MEDICINE | Facility: CLINIC | Age: 35
End: 2025-01-14
Payer: COMMERCIAL

## 2025-01-14 VITALS
OXYGEN SATURATION: 97 % | DIASTOLIC BLOOD PRESSURE: 60 MMHG | SYSTOLIC BLOOD PRESSURE: 100 MMHG | TEMPERATURE: 98 F | HEART RATE: 78 BPM | RESPIRATION RATE: 16 BRPM

## 2025-01-14 DIAGNOSIS — A54.9 NEISSERIA GONORRHEAE: Primary | ICD-10-CM

## 2025-01-14 PROCEDURE — 99207 PR NO CHARGE NURSE ONLY: CPT

## 2025-01-14 PROCEDURE — 96372 THER/PROPH/DIAG INJ SC/IM: CPT | Performed by: NURSE PRACTITIONER

## 2025-01-14 RX ADMIN — Medication 500 MG: at 14:40

## 2025-01-14 ASSESSMENT — PAIN SCALES - GENERAL: PAINLEVEL_OUTOF10: NO PAIN (0)

## 2025-01-14 NOTE — PROGRESS NOTES
Clinic Administered Medication Documentation      Injectable Medication Documentation    Is there an active order (written within the past 365 days, with administrations remaining, not ) in the chart? Yes.     Patient was given Ceftriaxone Sodium (Rocephin). Prior to medication administration, verified patient's identity using patient s name and date of birth. Please see MAR and medication order for additional information. Patient instructed to remain in clinic for 15 minutes and report any adverse reaction to staff immediately.    Vial/Syringe: Single dose vial. Was entire vial of medication used? Yes  Was this medication supplied by the patient? No  Is this a medication the patient will need to receive again? No - not necessary to check for refills remaining.    Julie Behrendt RN

## 2025-01-14 NOTE — NURSING NOTE
"At approximately 1457 [place] with reception state that patient is c/o having a \"tickle in her throat\".     Writer assisted patient back to the room for further evaluation. Pt A&O x 4, able to make her needs/wants known. No signs of respiratory distress noted, patient able to take in complete sentences, no signs of increased work of breathing or air hunger.     VSS LS CTA bilaterally capillary refill is < 3 seconds skin color is pink, noted some dry/rough patches of skin on her bilateral hands.     Patient was given 8 oz of apple juice, able to swallow this without difficulty.    Patient was states after drinking the juice she feels fine, no longer has any tickle in her throat, denies any numbness/tingling/swelling sensation.    Advised patient if she develops any signs of respiratory distress she should call 911 and go to the ED for evaluation and she agreed with this plan.     Update sent to Katherine Montes NP.    Julie Behrendt RN    " Hpi Title: Evaluation of Skin Lesions Additional History: -\\nEst pt here for full body exam, last in March\\nHx of MM .32 mm 2005 abdomen, BCCs, DNs\\nSpots of concern:\\n- scrotum\\n- scaly spots on top of head\\n- mole on right jawbone

## 2025-01-14 NOTE — TELEPHONE ENCOUNTER
Mailbox full. If she calls back, the order is in chart- pt needs to get apt on the RN schedule to have injection done.    Elsie Marcial MA on 1/14/2025 at 1:39 PM

## 2025-01-14 NOTE — TELEPHONE ENCOUNTER
Medication Question or Refill    Contacts       Contact Date/Time Type Contact Phone/Fax    01/14/2025 12:29 PM CST Phone (Incoming) Mora Reardon (Self) [phone] (H)            What medication are you calling about (include dose and sig)?: STD medication    Preferred Pharmacy:     [address] [address]  Phone: [phone] Fax: [phone]      Controlled Substance Agreement on file:   CSA -- Patient Level:    CSA: None found at the patient level.       Who prescribed the medication?: Norma Montes    Do you need a refill? Yes    When did you use the medication last? N/A    Patient offered an appointment? Yes: Redirected by decision tree    Do you have any questions or concerns?  Yes: Pt needs injection for STD      Could we send this information to you in [facility] or would you prefer to receive a phone call?:   Patient would prefer a phone call   Okay to leave a detailed message?: Yes at Cell number on file:    Telephone Information:   Mobile [phone]

## 2025-01-16 NOTE — TELEPHONE ENCOUNTER
Called patient and she has already came in for the injection on the 1/14/25.    Sana Wolf/ Patient

## 2025-01-25 ENCOUNTER — OFFICE VISIT (OUTPATIENT)
Dept: URGENT CARE | Facility: URGENT CARE | Age: 35
End: 2025-01-25
Payer: COMMERCIAL

## 2025-01-25 VITALS
TEMPERATURE: 97.6 F | HEART RATE: 71 BPM | SYSTOLIC BLOOD PRESSURE: 118 MMHG | OXYGEN SATURATION: 99 % | DIASTOLIC BLOOD PRESSURE: 71 MMHG

## 2025-01-25 DIAGNOSIS — H65.92 OME (OTITIS MEDIA WITH EFFUSION), LEFT: Primary | ICD-10-CM

## 2025-01-25 PROCEDURE — 99213 OFFICE O/P EST LOW 20 MIN: CPT | Performed by: NURSE PRACTITIONER

## 2025-01-25 NOTE — PATIENT INSTRUCTIONS
You have an ear infection. We will treat this with an antibiotic. I have sent Augmentin to your pharmacy. Please take this twice daily for 10 days. Take with food to avoid stomach upset. Consider taking a probiotic while on antibiotics to put the good bacteria back in your gut. Follow up in the next 7-10 days if not improving as expected, sooner if worse.
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
## 2025-03-06 ENCOUNTER — OFFICE VISIT (OUTPATIENT)
Dept: URGENT CARE | Facility: URGENT CARE | Age: 35
End: 2025-03-06
Payer: COMMERCIAL

## 2025-03-06 VITALS
TEMPERATURE: 98.6 F | BODY MASS INDEX: 32.12 KG/M2 | RESPIRATION RATE: 14 BRPM | WEIGHT: 170 LBS | HEART RATE: 67 BPM | SYSTOLIC BLOOD PRESSURE: 105 MMHG | DIASTOLIC BLOOD PRESSURE: 64 MMHG | OXYGEN SATURATION: 98 %

## 2025-03-06 DIAGNOSIS — H92.01 RIGHT EAR PAIN: Primary | ICD-10-CM

## 2025-03-06 RX ORDER — CHOLECALCIFEROL (VITAMIN D3) 1250 MCG
1250 CAPSULE ORAL
COMMUNITY
Start: 2025-02-07

## 2025-03-06 ASSESSMENT — ENCOUNTER SYMPTOMS
ALLERGIC/IMMUNOLOGIC NEGATIVE: 1
DIARRHEA: 0
SHORTNESS OF BREATH: 0
RHINORRHEA: 0
HEADACHES: 0
EYES NEGATIVE: 1
PALPITATIONS: 0
NAUSEA: 0
LIGHT-HEADEDNESS: 0
COUGH: 0
MUSCULOSKELETAL NEGATIVE: 1
RESPIRATORY NEGATIVE: 1
BACK PAIN: 0
NECK STIFFNESS: 0
CARDIOVASCULAR NEGATIVE: 1
ENDOCRINE NEGATIVE: 1
WOUND: 0
ARTHRALGIAS: 0
SORE THROAT: 0
NECK PAIN: 0
FEVER: 0
CHILLS: 0
DIZZINESS: 0
VOMITING: 0
WEAKNESS: 0
JOINT SWELLING: 0
MYALGIAS: 0

## 2025-03-06 NOTE — PROGRESS NOTES
Chief Complaint:     Chief Complaint   Patient presents with    Ear Problem     Pain in right ear x 2 days.     No results found for any visits on 03/06/25.    Medical Decision Making:    Vital signs reviewed by Chay Sosa PA-C  /64   Pulse 67   Temp 98.6  F (37  C) (Tympanic)   Resp 14   Wt 77.1 kg (170 lb)   LMP 01/04/2025   SpO2 98%   BMI 32.12 kg/m      Differential Diagnosis:  URI Adult/Peds:  Acute right otitis media, Viral syndrome, and Viral upper respiratory illness        ASSESSMENT    1. Right ear pain        PLAN    Patient is in no acute distress.    Temp is 98.6 in clinic today, lung sounds were clear, and O2 sats at 98% on RA.    No indication of ear infection at this time.    Rest, Push fluids, vaporizer, elevation of head of bed.  Ibuprofen and or Tylenol for any fever or body aches.  If symptoms worsen, recheck immediately otherwise follow up with your PCP in 1 week if symptoms are not improving.  Worrisome symptoms discussed with instructions to go to the ED.  Patient verbalized understanding and agreed with this plan.    Labs:    No results found for any visits on 03/06/25.     Vital signs reviewed by Chay Sosa PA-C  /64   Pulse 67   Temp 98.6  F (37  C) (Tympanic)   Resp 14   Wt 77.1 kg (170 lb)   LMP 01/04/2025   SpO2 98%   BMI 32.12 kg/m      Current Meds      Current Outpatient Medications:     acetaminophen (TYLENOL) 325 MG tablet, Take 325-650 mg by mouth every 6 hours as needed for mild pain, Disp: , Rfl:     cholecalciferol (VITAMIN D3) 1250 mcg (06107 units) capsule, Take 1,250 mcg by mouth., Disp: , Rfl:     gabapentin (NEURONTIN) 300 MG capsule, Take 1 capsule (300 mg) by mouth 3 times daily., Disp: 90 capsule, Rfl: 3    potassium chloride rina ER (KLOR-CON M20) 20 MEQ CR tablet, Take 2 tablets (40 mEq) by mouth daily., Disp: 60 tablet, Rfl: 5      Respiratory History    occasional episodes of bronchitis      SUBJECTIVE    HPI: Mora Reardon  is an 34 year old female who presents with ear pain right.  Symptoms began 2  days ago and has unchanged.  There is no shortness of breath, wheezing, and chest pain.  Patient is eating and drinking well.  No fever, nausea, vomiting, or diarrhea.    Patient denies any recent travel or exposure to known COVID positive tested individual.      ROS:     Review of Systems   Constitutional:  Negative for chills and fever.   HENT:  Positive for ear pain. Negative for congestion, rhinorrhea and sore throat.    Eyes: Negative.    Respiratory: Negative.  Negative for cough and shortness of breath.    Cardiovascular: Negative.  Negative for chest pain and palpitations.   Gastrointestinal:  Negative for diarrhea, nausea and vomiting.   Endocrine: Negative.    Genitourinary: Negative.    Musculoskeletal: Negative.  Negative for arthralgias, back pain, joint swelling, myalgias, neck pain and neck stiffness.   Skin: Negative.  Negative for rash and wound.   Allergic/Immunologic: Negative.  Negative for immunocompromised state.   Neurological:  Negative for dizziness, weakness, light-headedness and headaches.         Family History   Family History   Problem Relation Age of Onset    Diabetes Mother     Thyroid Disease Mother     Depression Mother     Hypertension Paternal Grandmother     Cancer - colorectal Paternal Grandmother         colon    Cancer Paternal Grandfather         throat cancer, not smoker    Genetic Disorder Sister         Bartter Syndrome        Problem history  Patient Active Problem List   Diagnosis    Bipolar disorder (H)    Constipation    Bartter's syndrome    TOBACCO USE DISORDER-quit    CTS (carpal tunnel syndrome)    GERD (gastroesophageal reflux disease)    Rh negative, antepartum    Cervical high risk HPV (human papillomavirus) test positive    Encounter for sterilization    Status post bilateral salpingectomy    Bartter's syndrome    Acute midline low back pain without sciatica    Spondylolisthesis,  grade 2    Lumbosacral radiculopathy    Paresthesias    Chronic hypokalemia    Gitelman syndrome    Bartter syndrome        Allergies  Allergies   Allergen Reactions    Keflex [Cephalexin Monohydrate] Hives and Rash        Social History  Social History     Socioeconomic History    Marital status:      Spouse name: Not on file    Number of children: 1    Years of education: Not on file    Highest education level: Not on file   Occupational History    Not on file   Tobacco Use    Smoking status: Every Day     Current packs/day: 0.50     Average packs/day: 0.5 packs/day for 6.0 years (3.0 ttl pk-yrs)     Types: Cigarettes    Smokeless tobacco: Never   Vaping Use    Vaping status: Never Used   Substance and Sexual Activity    Alcohol use: No     Alcohol/week: 0.0 standard drinks of alcohol    Drug use: No     Comment: prior use 7/2016    Sexual activity: Yes     Partners: Male   Other Topics Concern    Parent/sibling w/ CABG, MI or angioplasty before 65F 55M? No     Service No    Blood Transfusions No    Caffeine Concern Yes     Comment: 1-2 cans a day    Occupational Exposure No    Hobby Hazards No    Sleep Concern No    Stress Concern No    Weight Concern No    Special Diet No    Back Care No    Exercise No    Bike Helmet No    Seat Belt Yes    Self-Exams Not Asked   Social History Narrative    Not on file     Social Drivers of Health     Financial Resource Strain: Not on file   Food Insecurity: Not on file   Transportation Needs: Not on file   Physical Activity: Not on file   Stress: Not on file   Social Connections: Not on file   Interpersonal Safety: Low Risk  (8/28/2024)    Interpersonal Safety     Do you feel physically and emotionally safe where you currently live?: Yes     Within the past 12 months, have you been hit, slapped, kicked or otherwise physically hurt by someone?: No     Within the past 12 months, have you been humiliated or emotionally abused in other ways by your partner or  ex-partner?: No   Housing Stability: Not on file        OBJECTIVE     Vital signs reviewed by Chay Sosa PA-C  /64   Pulse 67   Temp 98.6  F (37  C) (Tympanic)   Resp 14   Wt 77.1 kg (170 lb)   LMP 01/04/2025   SpO2 98%   BMI 32.12 kg/m       Physical Exam  Vitals and nursing note reviewed.   Constitutional:       General: She is not in acute distress.     Appearance: She is well-developed. She is not ill-appearing, toxic-appearing or diaphoretic.   HENT:      Head: Normocephalic and atraumatic.      Right Ear: Hearing, tympanic membrane, ear canal and external ear normal. Tympanic membrane is not perforated, erythematous, retracted or bulging.      Left Ear: Hearing, tympanic membrane, ear canal and external ear normal. Tympanic membrane is not perforated, erythematous, retracted or bulging.      Nose: Congestion present. No mucosal edema or rhinorrhea.      Right Sinus: No maxillary sinus tenderness or frontal sinus tenderness.      Left Sinus: No maxillary sinus tenderness or frontal sinus tenderness.      Mouth/Throat:      Pharynx: No pharyngeal swelling, oropharyngeal exudate, posterior oropharyngeal erythema or uvula swelling.      Tonsils: No tonsillar exudate or tonsillar abscesses. 0 on the right. 0 on the left.   Eyes:      General:         Right eye: No discharge.         Left eye: No discharge.      Pupils: Pupils are equal, round, and reactive to light.   Cardiovascular:      Rate and Rhythm: Normal rate and regular rhythm.      Heart sounds: Normal heart sounds. No murmur heard.     No friction rub. No gallop.   Pulmonary:      Effort: Pulmonary effort is normal. No respiratory distress.      Breath sounds: Normal breath sounds. No decreased breath sounds, wheezing, rhonchi or rales.   Chest:      Chest wall: No tenderness.   Abdominal:      General: Bowel sounds are normal. There is no distension.      Palpations: Abdomen is soft. There is no mass.      Tenderness: There is no  abdominal tenderness. There is no guarding.   Musculoskeletal:      Cervical back: Normal range of motion and neck supple.   Lymphadenopathy:      Head:      Right side of head: No submental, submandibular, tonsillar, preauricular or posterior auricular adenopathy.      Left side of head: No submental, submandibular, tonsillar, preauricular or posterior auricular adenopathy.      Cervical: No cervical adenopathy.      Right cervical: No superficial or posterior cervical adenopathy.     Left cervical: No superficial or posterior cervical adenopathy.   Skin:     General: Skin is warm and dry.      Findings: No rash.   Neurological:      Mental Status: She is alert and oriented to person, place, and time.      Cranial Nerves: No cranial nerve deficit.      Deep Tendon Reflexes: Reflexes are normal and symmetric.   Psychiatric:         Behavior: Behavior normal. Behavior is cooperative.         Thought Content: Thought content normal.         Judgment: Judgment normal.           Chay Sosa PA-C  3/6/2025, 1:53 PM

## 2025-04-24 ENCOUNTER — LAB (OUTPATIENT)
Dept: LAB | Facility: CLINIC | Age: 35
End: 2025-04-24
Payer: COMMERCIAL

## 2025-04-24 ENCOUNTER — VIRTUAL VISIT (OUTPATIENT)
Dept: FAMILY MEDICINE | Facility: CLINIC | Age: 35
End: 2025-04-24
Payer: COMMERCIAL

## 2025-04-24 DIAGNOSIS — R10.2 PELVIC PAIN IN FEMALE: Primary | ICD-10-CM

## 2025-04-24 DIAGNOSIS — R10.2 PELVIC PAIN IN FEMALE: ICD-10-CM

## 2025-04-24 LAB
ALBUMIN UR-MCNC: NEGATIVE MG/DL
APPEARANCE UR: CLEAR
BILIRUB UR QL STRIP: NEGATIVE
COLOR UR AUTO: YELLOW
GLUCOSE UR STRIP-MCNC: NEGATIVE MG/DL
HGB UR QL STRIP: ABNORMAL
KETONES UR STRIP-MCNC: NEGATIVE MG/DL
LEUKOCYTE ESTERASE UR QL STRIP: NEGATIVE
NITRATE UR QL: NEGATIVE
PH UR STRIP: 7 [PH] (ref 5–7)
RBC #/AREA URNS AUTO: ABNORMAL /HPF
SP GR UR STRIP: 1.02 (ref 1–1.03)
SQUAMOUS #/AREA URNS AUTO: ABNORMAL /LPF
UROBILINOGEN UR STRIP-ACNC: 0.2 E.U./DL
WBC #/AREA URNS AUTO: ABNORMAL /HPF

## 2025-04-24 PROCEDURE — 98005 SYNCH AUDIO-VIDEO EST LOW 20: CPT | Performed by: NURSE PRACTITIONER

## 2025-04-24 NOTE — PROGRESS NOTES
Mora is a 34 year old who is being evaluated via a billable video visit.    How would you like to obtain your AVS? MyChart  If the video visit is dropped, the invitation should be resent by: Text to cell phone: [phone]   Will anyone else be joining your video visit? No    {PROVIDER CHARTING PREFERENCE:197864}    Subjective   Mora is a 34 year old, presenting for the following health issues:  Urinary Pain (Blood in urine, wondering if could be gonorrhea. )        4/24/2025    10:55 AM   Additional Questions   Roomed by Sana RIOJAS   Accompanied by self         4/24/2025    10:55 AM   Patient Reported Additional Medications   Patient reports taking the following new medications no new meds     Video Start Time: 11:36 AM    History of Present Illness       Reason for visit:  Pain in stomach blood when i wipe    She eats 0-1 servings of fruits and vegetables daily.She consumes 3 sweetened beverage(s) daily.She exercises with enough effort to increase her heart rate 60 or more minutes per day.  She exercises with enough effort to increase her heart rate 5 days per week. She is missing 2 dose(s) of medications per week.  She is not taking prescribed medications regularly due to remembering to take.        Urinary Concerns Female  Onset/Duration: Symptoms slightly went away but pain was still present, blood started about 2 days ago   Description:   Painful urination (Dysuria): YES           Frequency: YES  Blood in urine (Hematuria): YES  Delay in urine (Hesitency): YES  Intensity: mild  Progression of Symptoms:  worsening  Accompanying Signs & Symptoms:  Fever/chills: yes, did have fever yesterday   Flank pain: YES  Nausea and vomiting: YES- vomited   Vaginal symptoms: blood   Abdominal/Pelvic Pain: YES  History:   History of frequent UTI s: Yes, gonorrhea   History of kidney stones: No  Sexually Active: YES, new partners   Possibility of pregnancy: No  Precipitating or alleviating factors: None  Therapies tried and  outcome: OTC advil or tylenol         Review of Systems  Constitutional, HEENT, cardiovascular, pulmonary, gi and gu systems are negative, except as otherwise noted.      Objective           Vitals:  No vitals were obtained today due to virtual visit.    Physical Exam   GENERAL: alert and no distress  EYES: Eyes grossly normal to inspection.  No discharge or erythema, or obvious scleral/conjunctival abnormalities.  RESP: No audible wheeze, cough, or visible cyanosis.    SKIN: Visible skin clear. No significant rash, abnormal pigmentation or lesions.  NEURO: Cranial nerves grossly intact.  Mentation and speech appropriate for age.  PSYCH: Appropriate affect, tone, and pace of words        Video-Visit Details    Type of service:  Video Visit   Video End Time:11:41 AM  Originating Location (pt. Location): Home  Distant Location (provider location):  On-site  Platform used for Video Visit: Kim  Signed Electronically by: ADELAIDE Hinojosa CNP  {Email feedback regarding this note to [email or web address]   :[id number]}

## 2025-04-25 ENCOUNTER — TELEPHONE (OUTPATIENT)
Dept: FAMILY MEDICINE | Facility: CLINIC | Age: 35
End: 2025-04-25
Payer: COMMERCIAL

## 2025-04-25 NOTE — TELEPHONE ENCOUNTER
Patient reports that she had VV yesterday with PCP and that her Patient reports hesitancy to start voiding and she is having blood on the toilet paper when she wipes but is not bleeding between voiding. Lower abdominal pain - worse on the R side, Feels sharp, 4-5/10 at worst. Denies lower back pain or fever. Nothing seems to make it worse besides intercourse, using heating helps some. She does report a new sexual partner recently, or someone that she has not been with     First noticed this in January and  she reports that the bleeding has gotten worse. She is having to wipe twice after every void with bright red blood on toilet paper. Please advise on labs. Scheduled OV to evaluate in person next week as patient is concerned it is not getting better. No results on UA from yesterday and VV note not completed.     Patient advise to be evaluated sooner if pain worsens, bleeding increases, she has new lower back pain, or fever. She verbalizes understanding.    Lilo Turner RN on 4/25/2025 at 3:21 PM

## 2025-04-25 NOTE — TELEPHONE ENCOUNTER
Symptoms    Describe your symptoms: patient called stating that she was seen yesterday by a virtual visit, her labs came back negative, however continues to have bleeding and pain. Asking if there are additional testings that could be done.     Any pain: -patient report pain in range of 4-5      Preferred Pharmacy:       [address] [address]  Phone: [phone] Fax: [phone]        Could we send this information to you in [employer] or would you prefer to receive a phone call?:   Patient would prefer a phone call   Okay to leave a detailed message?: Yes at Home number on file [phone] (home)

## 2025-04-28 NOTE — TELEPHONE ENCOUNTER
Please call to check on Mora's symptoms today. She may be a good ADS candidate, otherwise is looks like she has an appointment with me tomorrow.     ADELAIDE Hinojosa CNP

## 2025-04-29 ENCOUNTER — ANCILLARY PROCEDURE (OUTPATIENT)
Dept: GENERAL RADIOLOGY | Facility: CLINIC | Age: 35
End: 2025-04-29
Attending: NURSE PRACTITIONER
Payer: COMMERCIAL

## 2025-04-29 ENCOUNTER — OFFICE VISIT (OUTPATIENT)
Dept: FAMILY MEDICINE | Facility: CLINIC | Age: 35
End: 2025-04-29
Payer: COMMERCIAL

## 2025-04-29 VITALS
OXYGEN SATURATION: 98 % | HEART RATE: 78 BPM | TEMPERATURE: 97.5 F | SYSTOLIC BLOOD PRESSURE: 104 MMHG | BODY MASS INDEX: 30.78 KG/M2 | WEIGHT: 163 LBS | RESPIRATION RATE: 18 BRPM | HEIGHT: 61 IN | DIASTOLIC BLOOD PRESSURE: 64 MMHG

## 2025-04-29 DIAGNOSIS — Z12.4 CERVICAL CANCER SCREENING: ICD-10-CM

## 2025-04-29 DIAGNOSIS — R31.0 GROSS HEMATURIA: ICD-10-CM

## 2025-04-29 DIAGNOSIS — B96.89 BACTERIAL VAGINOSIS: ICD-10-CM

## 2025-04-29 DIAGNOSIS — R10.2 PELVIC PAIN IN FEMALE: ICD-10-CM

## 2025-04-29 DIAGNOSIS — R31.0 GROSS HEMATURIA: Primary | ICD-10-CM

## 2025-04-29 DIAGNOSIS — N76.0 BACTERIAL VAGINOSIS: ICD-10-CM

## 2025-04-29 LAB
ALBUMIN SERPL BCG-MCNC: 4.3 G/DL (ref 3.5–5.2)
ALBUMIN UR-MCNC: NEGATIVE MG/DL
ALP SERPL-CCNC: 70 U/L (ref 40–150)
ALT SERPL W P-5'-P-CCNC: 17 U/L (ref 0–50)
ANION GAP SERPL CALCULATED.3IONS-SCNC: 12 MMOL/L (ref 7–15)
APPEARANCE UR: CLEAR
AST SERPL W P-5'-P-CCNC: 22 U/L (ref 0–45)
BASOPHILS # BLD AUTO: 0.1 10E3/UL (ref 0–0.2)
BASOPHILS NFR BLD AUTO: 1 %
BILIRUB SERPL-MCNC: <0.2 MG/DL
BILIRUB UR QL STRIP: NEGATIVE
BUN SERPL-MCNC: 14.7 MG/DL (ref 6–20)
CALCIUM SERPL-MCNC: 9.7 MG/DL (ref 8.8–10.4)
CHLORIDE SERPL-SCNC: 96 MMOL/L (ref 98–107)
CLUE CELLS: PRESENT
COLOR UR AUTO: YELLOW
CREAT SERPL-MCNC: 0.88 MG/DL (ref 0.51–0.95)
EGFRCR SERPLBLD CKD-EPI 2021: 87 ML/MIN/1.73M2
EOSINOPHIL # BLD AUTO: 0.3 10E3/UL (ref 0–0.7)
EOSINOPHIL NFR BLD AUTO: 3 %
ERYTHROCYTE [DISTWIDTH] IN BLOOD BY AUTOMATED COUNT: 13.2 % (ref 10–15)
GLUCOSE SERPL-MCNC: 85 MG/DL (ref 70–99)
GLUCOSE UR STRIP-MCNC: NEGATIVE MG/DL
HCO3 SERPL-SCNC: 30 MMOL/L (ref 22–29)
HCT VFR BLD AUTO: 42.2 % (ref 35–47)
HGB BLD-MCNC: 14.2 G/DL (ref 11.7–15.7)
HGB UR QL STRIP: NEGATIVE
HPV HR 12 DNA CVX QL NAA+PROBE: NEGATIVE
HPV16 DNA CVX QL NAA+PROBE: NEGATIVE
HPV18 DNA CVX QL NAA+PROBE: NEGATIVE
HUMAN PAPILLOMA VIRUS FINAL DIAGNOSIS: NORMAL
IMM GRANULOCYTES # BLD: 0 10E3/UL
IMM GRANULOCYTES NFR BLD: 0 %
KETONES UR STRIP-MCNC: NEGATIVE MG/DL
LEUKOCYTE ESTERASE UR QL STRIP: NEGATIVE
LYMPHOCYTES # BLD AUTO: 3 10E3/UL (ref 0.8–5.3)
LYMPHOCYTES NFR BLD AUTO: 30 %
MCH RBC QN AUTO: 29.5 PG (ref 26.5–33)
MCHC RBC AUTO-ENTMCNC: 33.6 G/DL (ref 31.5–36.5)
MCV RBC AUTO: 88 FL (ref 78–100)
MONOCYTES # BLD AUTO: 0.5 10E3/UL (ref 0–1.3)
MONOCYTES NFR BLD AUTO: 5 %
NEUTROPHILS # BLD AUTO: 6.3 10E3/UL (ref 1.6–8.3)
NEUTROPHILS NFR BLD AUTO: 62 %
NITRATE UR QL: NEGATIVE
PH UR STRIP: 7.5 [PH] (ref 5–7)
PLATELET # BLD AUTO: 259 10E3/UL (ref 150–450)
POTASSIUM SERPL-SCNC: 3 MMOL/L (ref 3.4–5.3)
PROT SERPL-MCNC: 7.8 G/DL (ref 6.4–8.3)
RBC # BLD AUTO: 4.81 10E6/UL (ref 3.8–5.2)
SODIUM SERPL-SCNC: 138 MMOL/L (ref 135–145)
SP GR UR STRIP: 1.02 (ref 1–1.03)
TRICHOMONAS, WET PREP: ABNORMAL
UROBILINOGEN UR STRIP-ACNC: 0.2 E.U./DL
WBC # BLD AUTO: 10.2 10E3/UL (ref 4–11)
WBC'S/HIGH POWER FIELD, WET PREP: ABNORMAL
YEAST, WET PREP: ABNORMAL

## 2025-04-29 PROCEDURE — 87624 HPV HI-RISK TYP POOLED RSLT: CPT | Performed by: NURSE PRACTITIONER

## 2025-04-29 PROCEDURE — 85025 COMPLETE CBC W/AUTO DIFF WBC: CPT | Performed by: NURSE PRACTITIONER

## 2025-04-29 PROCEDURE — 81003 URINALYSIS AUTO W/O SCOPE: CPT | Performed by: NURSE PRACTITIONER

## 2025-04-29 PROCEDURE — 87210 SMEAR WET MOUNT SALINE/INK: CPT | Performed by: NURSE PRACTITIONER

## 2025-04-29 PROCEDURE — 87591 N.GONORRHOEAE DNA AMP PROB: CPT | Performed by: NURSE PRACTITIONER

## 2025-04-29 PROCEDURE — 80053 COMPREHEN METABOLIC PANEL: CPT | Performed by: NURSE PRACTITIONER

## 2025-04-29 PROCEDURE — 36415 COLL VENOUS BLD VENIPUNCTURE: CPT | Performed by: NURSE PRACTITIONER

## 2025-04-29 PROCEDURE — 99214 OFFICE O/P EST MOD 30 MIN: CPT | Performed by: NURSE PRACTITIONER

## 2025-04-29 PROCEDURE — 74018 RADEX ABDOMEN 1 VIEW: CPT | Mod: TC | Performed by: RADIOLOGY

## 2025-04-29 PROCEDURE — 87491 CHLMYD TRACH DNA AMP PROBE: CPT | Performed by: NURSE PRACTITIONER

## 2025-04-29 RX ORDER — METRONIDAZOLE 500 MG/1
500 TABLET ORAL 2 TIMES DAILY
Qty: 14 TABLET | Refills: 0 | Status: SHIPPED | OUTPATIENT
Start: 2025-04-29 | End: 2025-05-06

## 2025-04-29 ASSESSMENT — PAIN SCALES - GENERAL: PAINLEVEL_OUTOF10: MODERATE PAIN (4)

## 2025-04-29 NOTE — PATIENT INSTRUCTIONS
Flagyl twice for 7 days    Additional labs pending    Follow up if symptoms do not improve or worsen.

## 2025-04-29 NOTE — TELEPHONE ENCOUNTER
Patient was seen today in clinic by Katherine Montes NP, telephone encounter closed.     Julie Behrendt RN  b

## 2025-04-29 NOTE — PROGRESS NOTES
Assessment & Plan     Gross hematuria  No acute distress. UA repeated which was normal, abdominal x ray with moderate stool burden, no stones appreciated.   - XR Abdomen 1 View; Future  - UA Macroscopic with reflex to Microscopic and Culture - Lab Collect; Future  - UA Macroscopic with reflex to Microscopic and Culture - Lab Collect    Pelvic pain in female  With wet prep and GC chlamydia completed today, wet prep with bacterial vaginosis.  Flagyl sent to the pharmacy for symptoms. Symptomatic care and follow up discussed  - Wet prep - Clinic Collect  - Chlamydia trachomatis/Neisseria gonorrhoeae by PCR  - Comprehensive metabolic panel (BMP + Alb, Alk Phos, ALT, AST, Total. Bili, TP); Future  - CBC with platelets and differential; Future  - Comprehensive metabolic panel (BMP + Alb, Alk Phos, ALT, AST, Total. Bili, TP)  - CBC with platelets and differential    Bacterial vaginosis  BV on wet prep, metronidazole per above  - metroNIDAZOLE (FLAGYL) 500 MG tablet; Take 1 tablet (500 mg) by mouth 2 times daily for 7 days.    Cervical cancer screening  Updated today  - HPV and Gynecologic Cytology Panel - Recommended Age 30 - 65 Years      Nadeen Velazco is a 34 year old, presenting for the following health issues:  Urinary Problem        4/29/2025     9:48 AM   Additional Questions   Roomed by Sana RIOJAS   Accompanied by self         4/29/2025     9:48 AM   Patient Reported Additional Medications   Patient reports taking the following new medications no new meds     History of Present Illness       Reason for visit:  Pain in stomach blood when i wipe    She eats 0-1 servings of fruits and vegetables daily.She consumes 3 sweetened beverage(s) daily.She exercises with enough effort to increase her heart rate 60 or more minutes per day.  She exercises with enough effort to increase her heart rate 5 days per week. She is missing 2 dose(s) of medications per week.  She is not taking prescribed medications regularly due to  "remembering to take.      History of Present Illness-  Mroa Reardon, 34-year-old female  - Blood droplets after urination, noticed when wiping  - No blood in pants, not related to menstrual cycle  - No vaginal symptoms or discharge  - No history of kidney stones  - Pain on the right side, not affecting the back  - Chronic back pain, does not seem changed  - Previous test showed no abnormalities      Genitourinary - Female  Onset/Duration: on going the last couple months, getting worse the last couple weeks   Description:   Painful urination (Dysuria): YES- occasionally at the end            Frequency: YES   Blood in urine (Hematuria): YES, at the end of urinating droplets will come out   Delay in urine (Hesitency): YES  Intensity: mild  Progression of Symptoms:  worsening  Accompanying Signs & Symptoms:  Fever/chills: YES- hot flashes   Flank pain: YES  Nausea and vomiting: YES- in morning   Vaginal symptoms: is painful during intercourse   Abdominal/Pelvic Pain: YES, more on the right side  History:   History of frequent UTI s: YES  History of kidney stones: No  Sexually Active: YES  Possibility of pregnancy: Yes  Precipitating or alleviating factors: None  Therapies tried and outcome: tylenol and midol       Review of Systems  Constitutional, HEENT, cardiovascular, pulmonary, gi and gu systems are negative, except as otherwise noted.      Objective    /64   Pulse 78   Temp 97.5  F (36.4  C) (Tympanic)   Resp 18   Ht 1.549 m (5' 1\")   Wt 73.9 kg (163 lb)   LMP 04/10/2025 (Approximate)   SpO2 98%   BMI 30.80 kg/m    Body mass index is 30.8 kg/m .  Physical Exam   GENERAL: alert and no distress  ABDOMEN: tenderness RLQ and bowel sounds normal   (female): normal female external genitalia, normal urethral meatus , normal vaginal mucosa, vaginal discharge - moderate and white, and normal cervix, adnexae, and uterus without masses.  MS: no gross musculoskeletal defects noted, tenderness bilateral " lumbar paraspinal area  PSYCH: mentation appears normal, affect normal/bright    Results for orders placed or performed in visit on 04/29/25   UA Macroscopic with reflex to Microscopic and Culture - Lab Collect     Status: Abnormal    Specimen: Urine, Clean Catch   Result Value Ref Range    Color Urine Yellow Colorless, Straw, Light Yellow, Yellow    Appearance Urine Clear Clear    Glucose Urine Negative Negative mg/dL    Bilirubin Urine Negative Negative    Ketones Urine Negative Negative mg/dL    Specific Gravity Urine 1.020 1.003 - 1.035    Blood Urine Negative Negative    pH Urine 7.5 (H) 5.0 - 7.0    Protein Albumin Urine Negative Negative mg/dL    Urobilinogen Urine 0.2 0.2, 1.0 E.U./dL    Nitrite Urine Negative Negative    Leukocyte Esterase Urine Negative Negative    Narrative    Microscopic not indicated   Comprehensive metabolic panel (BMP + Alb, Alk Phos, ALT, AST, Total. Bili, TP)     Status: Abnormal   Result Value Ref Range    Sodium 138 135 - 145 mmol/L    Potassium 3.0 (L) 3.4 - 5.3 mmol/L    Carbon Dioxide (CO2) 30 (H) 22 - 29 mmol/L    Anion Gap 12 7 - 15 mmol/L    Urea Nitrogen 14.7 6.0 - 20.0 mg/dL    Creatinine 0.88 0.51 - 0.95 mg/dL    GFR Estimate 87 >60 mL/min/1.73m2    Calcium 9.7 8.8 - 10.4 mg/dL    Chloride 96 (L) 98 - 107 mmol/L    Glucose 85 70 - 99 mg/dL    Alkaline Phosphatase 70 40 - 150 U/L    AST 22 0 - 45 U/L    ALT 17 0 - 50 U/L    Protein Total 7.8 6.4 - 8.3 g/dL    Albumin 4.3 3.5 - 5.2 g/dL    Bilirubin Total <0.2 <=1.2 mg/dL   CBC with platelets and differential     Status: None   Result Value Ref Range    WBC Count 10.2 4.0 - 11.0 10e3/uL    RBC Count 4.81 3.80 - 5.20 10e6/uL    Hemoglobin 14.2 11.7 - 15.7 g/dL    Hematocrit 42.2 35.0 - 47.0 %    MCV 88 78 - 100 fL    MCH 29.5 26.5 - 33.0 pg    MCHC 33.6 31.5 - 36.5 g/dL    RDW 13.2 10.0 - 15.0 %    Platelet Count 259 150 - 450 10e3/uL    % Neutrophils 62 %    % Lymphocytes 30 %    % Monocytes 5 %    % Eosinophils 3 %    %  Basophils 1 %    % Immature Granulocytes 0 %    Absolute Neutrophils 6.3 1.6 - 8.3 10e3/uL    Absolute Lymphocytes 3.0 0.8 - 5.3 10e3/uL    Absolute Monocytes 0.5 0.0 - 1.3 10e3/uL    Absolute Eosinophils 0.3 0.0 - 0.7 10e3/uL    Absolute Basophils 0.1 0.0 - 0.2 10e3/uL    Absolute Immature Granulocytes 0.0 <=0.4 10e3/uL   Wet prep - Clinic Collect     Status: Abnormal    Specimen: Vagina; Swab   Result Value Ref Range    Trichomonas Absent Absent    Yeast Absent Absent    Clue Cells Present (A) Absent    WBCs/high power field 1+ (A) None   CBC with platelets and differential     Status: None    Narrative    The following orders were created for panel order CBC with platelets and differential.  Procedure                               Abnormality         Status                     ---------                               -----------         ------                     CBC with platelets and ...[[id number]]                      Final result                 Please view results for these tests on the individual orders.           Signed Electronically by: ADELAIDE Hinojosa CNP

## 2025-04-30 LAB
C TRACH DNA SPEC QL PROBE+SIG AMP: NEGATIVE
N GONORRHOEA DNA SPEC QL NAA+PROBE: NEGATIVE
SPECIMEN TYPE: NORMAL

## (undated) DEVICE — STOCKING SLEEVE COMPRESSION CALF LG

## (undated) DEVICE — GOWN IMPERVIOUS SPECIALTY XLG/XLONG 32474

## (undated) DEVICE — GOWN XLG DISP 9545

## (undated) DEVICE — NDL INSUFFLATION 13GA 120MM C2201

## (undated) DEVICE — GLOVE PROTEXIS W/NEU-THERA 7.5  2D73TE75

## (undated) DEVICE — GLOVE PROTEXIS MICRO 5.5  2D73PM55

## (undated) DEVICE — SU VICRYL 4-0 FS-2 27" J422-H

## (undated) DEVICE — DRSG TELFA 3X8" 1238

## (undated) DEVICE — ENDO TROCAR SLEEVE KII ADV FIXATION 05X100MM CFS02

## (undated) DEVICE — PACK LAPAROSCOPY/PELVISCOPY STD

## (undated) DEVICE — BLADE CLIPPER 4406

## (undated) DEVICE — SOL NACL 0.9% IRRIG 1000ML BOTTLE 07138-09

## (undated) DEVICE — NDL SPINAL 22GA 3.5" QUINCKE 405181

## (undated) DEVICE — STOCKING SLEEVE COMPRESSION CALF MED

## (undated) DEVICE — SYR 05ML LL W/O NDL

## (undated) DEVICE — DRAPE POUCH INSTRUMENT 3 POCKET 1018L

## (undated) DEVICE — BLADE KNIFE SURG 11 371111

## (undated) DEVICE — GLOVE PROTEXIS BLUE W/NEU-THERA 7.0  2D73EB70

## (undated) DEVICE — ESU LIGASURE LAPAROSCOPIC BLUNT TIP SEALER 5MMX37CM LF1837

## (undated) DEVICE — TUBING INSUFFLATION W/FILTER CPC TO LUER 620-030-301

## (undated) DEVICE — LUBRICATING JELLY 4.25OZ

## (undated) DEVICE — GLOVE PROTEXIS W/NEU-THERA 7.0  2D73TE70

## (undated) DEVICE — GLOVE PROTEXIS W/NEU-THERA 6.5  2D73TE65

## (undated) DEVICE — SOL WATER IRRIG 1000ML BOTTLE 07139-09

## (undated) DEVICE — GOWN LG DISP 9515

## (undated) DEVICE — GLOVE PROTEXIS BLUE W/NEU-THERA 6.0  2D73EB60

## (undated) DEVICE — GLOVE PROTEXIS BLUE W/NEU-THERA 6.5  2D73EB65

## (undated) DEVICE — ANTIFOG SOLUTION W/FOAM PAD 31142527

## (undated) DEVICE — ESU HOLSTER PLASTIC DISP E2400

## (undated) DEVICE — GLOVE PROTEXIS BLUE W/NEU-THERA 7.5  2D73EB75

## (undated) DEVICE — CATH INTERMITTENT CLEAN-CATH FEMALE 14FR 6" VINYL LF 420614

## (undated) DEVICE — CATH TRAY FOLEY SURESTEP 16FR W/URINE MTR STATLK LF A303416A

## (undated) DEVICE — PAD PERI INDIV WRAP 11" 2022

## (undated) DEVICE — GOWN XXLG REINFORCED 9071EL

## (undated) DEVICE — DECANTER VIAL 2006S

## (undated) DEVICE — ENDO TROCAR FIRST ENTRY KII FIOS ADV FIX 05X100MM CFF03

## (undated) DEVICE — ADH SKIN CLOSURE PREMIERPRO EXOFIN 1.0ML 3470

## (undated) DEVICE — PREP CHLORAPREP 26ML TINTED ORANGE  260815

## (undated) RX ORDER — ONDANSETRON 2 MG/ML
INJECTION INTRAMUSCULAR; INTRAVENOUS
Status: DISPENSED
Start: 2019-03-26

## (undated) RX ORDER — BUPIVACAINE HYDROCHLORIDE 2.5 MG/ML
INJECTION, SOLUTION INFILTRATION; PERINEURAL
Status: DISPENSED
Start: 2021-04-12

## (undated) RX ORDER — FENTANYL CITRATE 50 UG/ML
INJECTION, SOLUTION INTRAMUSCULAR; INTRAVENOUS
Status: DISPENSED
Start: 2021-04-12

## (undated) RX ORDER — GLYCOPYRROLATE 0.2 MG/ML
INJECTION, SOLUTION INTRAMUSCULAR; INTRAVENOUS
Status: DISPENSED
Start: 2021-04-12

## (undated) RX ORDER — HYDROXYZINE HYDROCHLORIDE 25 MG/1
TABLET, FILM COATED ORAL
Status: DISPENSED
Start: 2021-04-12

## (undated) RX ORDER — DEXAMETHASONE SODIUM PHOSPHATE 10 MG/ML
INJECTION, SOLUTION INTRAMUSCULAR; INTRAVENOUS
Status: DISPENSED
Start: 2019-03-26

## (undated) RX ORDER — ALBUTEROL SULFATE 0.83 MG/ML
SOLUTION RESPIRATORY (INHALATION)
Status: DISPENSED
Start: 2021-04-12

## (undated) RX ORDER — MEPERIDINE HYDROCHLORIDE 25 MG/ML
INJECTION INTRAMUSCULAR; INTRAVENOUS; SUBCUTANEOUS
Status: DISPENSED
Start: 2019-03-26

## (undated) RX ORDER — CLINDAMYCIN PHOSPHATE 900 MG/50ML
INJECTION, SOLUTION INTRAVENOUS
Status: DISPENSED
Start: 2019-03-26

## (undated) RX ORDER — LIDOCAINE HYDROCHLORIDE 10 MG/ML
INJECTION, SOLUTION EPIDURAL; INFILTRATION; INTRACAUDAL; PERINEURAL
Status: DISPENSED
Start: 2019-03-26

## (undated) RX ORDER — LIDOCAINE HYDROCHLORIDE 10 MG/ML
INJECTION, SOLUTION EPIDURAL; INFILTRATION; INTRACAUDAL; PERINEURAL
Status: DISPENSED
Start: 2021-04-12

## (undated) RX ORDER — FENTANYL CITRATE 50 UG/ML
INJECTION, SOLUTION INTRAMUSCULAR; INTRAVENOUS
Status: DISPENSED
Start: 2019-03-26

## (undated) RX ORDER — PROPOFOL 10 MG/ML
INJECTION, EMULSION INTRAVENOUS
Status: DISPENSED
Start: 2021-04-12

## (undated) RX ORDER — ACETAMINOPHEN 325 MG/1
TABLET ORAL
Status: DISPENSED
Start: 2021-04-12

## (undated) RX ORDER — PHENYLEPHRINE HCL IN 0.9% NACL 1 MG/10 ML
SYRINGE (ML) INTRAVENOUS
Status: DISPENSED
Start: 2019-03-26

## (undated) RX ORDER — PROPOFOL 10 MG/ML
INJECTION, EMULSION INTRAVENOUS
Status: DISPENSED
Start: 2019-03-26

## (undated) RX ORDER — MAGNESIUM SULFATE HEPTAHYDRATE 40 MG/ML
INJECTION, SOLUTION INTRAVENOUS
Status: DISPENSED
Start: 2021-04-12

## (undated) RX ORDER — DEXAMETHASONE SODIUM PHOSPHATE 4 MG/ML
INJECTION, SOLUTION INTRA-ARTICULAR; INTRALESIONAL; INTRAMUSCULAR; INTRAVENOUS; SOFT TISSUE
Status: DISPENSED
Start: 2021-04-12

## (undated) RX ORDER — LIDOCAINE HYDROCHLORIDE 10 MG/ML
INJECTION, SOLUTION INFILTRATION; PERINEURAL
Status: DISPENSED
Start: 2019-03-26

## (undated) RX ORDER — KETOROLAC TROMETHAMINE 30 MG/ML
INJECTION, SOLUTION INTRAMUSCULAR; INTRAVENOUS
Status: DISPENSED
Start: 2021-04-12